# Patient Record
Sex: FEMALE | Race: WHITE
[De-identification: names, ages, dates, MRNs, and addresses within clinical notes are randomized per-mention and may not be internally consistent; named-entity substitution may affect disease eponyms.]

---

## 2023-06-23 ENCOUNTER — HOSPITAL ENCOUNTER (INPATIENT)
Dept: HOSPITAL 97 - ER | Age: 83
LOS: 3 days | Discharge: HOME | DRG: 291 | End: 2023-06-26
Attending: INTERNAL MEDICINE | Admitting: INTERNAL MEDICINE
Payer: COMMERCIAL

## 2023-06-23 VITALS — BODY MASS INDEX: 19 KG/M2

## 2023-06-23 DIAGNOSIS — Z88.8: ICD-10-CM

## 2023-06-23 DIAGNOSIS — J44.9: ICD-10-CM

## 2023-06-23 DIAGNOSIS — I71.20: ICD-10-CM

## 2023-06-23 DIAGNOSIS — R64: ICD-10-CM

## 2023-06-23 DIAGNOSIS — I48.91: ICD-10-CM

## 2023-06-23 DIAGNOSIS — Z88.3: ICD-10-CM

## 2023-06-23 DIAGNOSIS — Z88.2: ICD-10-CM

## 2023-06-23 DIAGNOSIS — I11.0: Primary | ICD-10-CM

## 2023-06-23 DIAGNOSIS — I50.31: ICD-10-CM

## 2023-06-23 LAB
ALBUMIN SERPL BCP-MCNC: 3 G/DL (ref 3.4–5)
ALP SERPL-CCNC: 129 U/L (ref 45–117)
ALT SERPL W P-5'-P-CCNC: 154 U/L (ref 13–56)
AST SERPL W P-5'-P-CCNC: 55 U/L (ref 15–37)
BILIRUB INDIRECT SERPL-MCNC: 0.4 MG/DL (ref 0.2–0.8)
BUN BLD-MCNC: 17 MG/DL (ref 7–18)
GLUCOSE SERPLBLD-MCNC: 122 MG/DL (ref 74–106)
HCT VFR BLD CALC: 39.3 % (ref 36–45)
LYMPHOCYTES # SPEC AUTO: 1 K/UL (ref 0.7–4.9)
MAGNESIUM SERPL-MCNC: 2.1 MG/DL (ref 1.6–2.4)
MCV RBC: 79.6 FL (ref 80–100)
NT-PROBNP SERPL-MCNC: 2649 PG/ML (ref ?–450)
PMV BLD: 7.9 FL (ref 7.6–11.3)
POTASSIUM SERPL-SCNC: 4 MEQ/L (ref 3.5–5.1)
RBC # BLD: 4.94 M/UL (ref 3.86–4.86)
TROPONIN I SERPL HS-MCNC: 14.7 PG/ML (ref ?–58.9)
TSH SERPL DL<=0.05 MIU/L-ACNC: 2.08 UIU/ML (ref 0.36–3.74)

## 2023-06-23 PROCEDURE — 94640 AIRWAY INHALATION TREATMENT: CPT

## 2023-06-23 PROCEDURE — 85025 COMPLETE CBC W/AUTO DIFF WBC: CPT

## 2023-06-23 PROCEDURE — 80053 COMPREHEN METABOLIC PANEL: CPT

## 2023-06-23 PROCEDURE — 74160 CT ABDOMEN W/CONTRAST: CPT

## 2023-06-23 PROCEDURE — 93005 ELECTROCARDIOGRAM TRACING: CPT

## 2023-06-23 PROCEDURE — 80076 HEPATIC FUNCTION PANEL: CPT

## 2023-06-23 PROCEDURE — 96374 THER/PROPH/DIAG INJ IV PUSH: CPT

## 2023-06-23 PROCEDURE — 99285 EMERGENCY DEPT VISIT HI MDM: CPT

## 2023-06-23 PROCEDURE — 71045 X-RAY EXAM CHEST 1 VIEW: CPT

## 2023-06-23 PROCEDURE — 84443 ASSAY THYROID STIM HORMONE: CPT

## 2023-06-23 PROCEDURE — 80048 BASIC METABOLIC PNL TOTAL CA: CPT

## 2023-06-23 PROCEDURE — 81001 URINALYSIS AUTO W/SCOPE: CPT

## 2023-06-23 PROCEDURE — 94760 N-INVAS EAR/PLS OXIMETRY 1: CPT

## 2023-06-23 PROCEDURE — 84484 ASSAY OF TROPONIN QUANT: CPT

## 2023-06-23 PROCEDURE — 71275 CT ANGIOGRAPHY CHEST: CPT

## 2023-06-23 PROCEDURE — 83880 ASSAY OF NATRIURETIC PEPTIDE: CPT

## 2023-06-23 PROCEDURE — 83735 ASSAY OF MAGNESIUM: CPT

## 2023-06-23 PROCEDURE — 36415 COLL VENOUS BLD VENIPUNCTURE: CPT

## 2023-06-23 RX ADMIN — FUROSEMIDE SCH MG: 10 INJECTION, SOLUTION INTRAVENOUS at 23:43

## 2023-06-23 RX ADMIN — Medication SCH ML: at 21:00

## 2023-06-23 RX ADMIN — SOTALOL HYDROCHLORIDE SCH MG: 80 TABLET ORAL at 23:43

## 2023-06-23 NOTE — RAD REPORT
EXAM DESCRIPTION:  CT - Chest For Pe Angio - 6/23/2023 3:45 pm

 

CLINICAL HISTORY:  Chest pain.

Afib with RVR

 

COMPARISON:  No comparisons

 

TECHNIQUE:  CT angiogram of the pulmonary arteries was performed with MIP.

 

All CT scans are performed using dose optimization technique as appropriate and may include automated
 exposure control or mA/KV adjustment according to patient size.

 

FINDINGS:  No evidence of pulmonary thromboembolism.

 

The ascending aorta is dilated to 4.9 cm compatible with aneurysm. Intraluminal contrast is not prese
nt within the thoracic aorta due to phase of scanning.

 

Linear opacities are present in both lung bases which may be atelectasis or pneumonia. Mild linear at
electasis also seen posterior left upper lobe.

 

Small left and small to moderate right pleural effusion.

 

No concerning bony finding.

 

IMPRESSION:  No evidence of pulmonary thromboembolism.

 

Ascending thoracic aortic aneurysm measuring 4.9 cm.

 

Small to moderate pleural effusion with atelectasis in both lung bases.

## 2023-06-23 NOTE — ER
Nurse's Notes                                                                                     

 [facility]                                                                 

Name: Toi Dhillon                                                                            

Age: 82 yrs                                                                                       

Sex: Female                                                                                       

: 1940                                                                                   

MRN: [id number]                                                                                   

Arrival Date: 2023                                                                          

Time: 11:00                                                                                       

Account#: [id number]                                                                            

Bed 8                                                                                             

Private MD: Travis Schmid V                                                                      

Diagnosis: Atrial fibrillation with rapid ventricular rate                                        

                                                                                                  

Presentation:                                                                                     

                                                                                             

11:05 Chief complaint: Patient states: fast heart rate onset yesterday. Patient states that   cm10

      she had a stress test done yesterday and her heart rate has been fast since yesterday.      

      Pt states that she took "some medicine from my homeopathic doctor for my blood pressure     

      and for my heart". Pt denies any chest pain but reports shortness of breath.                

      Coronavirus screen: Vaccine status: Patient reports being unvaccinated. Client denies       

      travel out of the [place] in the last 14 days. At this time, the client does not indicate      

      any symptoms associated with coronavirus-19. Ebola Screen: No symptoms or risks             

      identified at this time. Initial Sepsis Screen: Does the patient meet any 2 criteria?       

      No. Patient's initial sepsis screen is negative. Does the patient have a suspected          

      source of infection? No. Patient's initial sepsis screen is negative. Risk Assessment:      

      Do you want to hurt yourself or someone else? Patient reports no desire to harm self or     

      others. Onset of symptoms was 2023.                                                

11:05 Method Of Arrival: Wheelchair                                                           cm10

11:05 Acuity: EDUARDO 2                                                                           cm10

                                                                                                  

Historical:                                                                                       

- Allergies:                                                                                      

11:07 Sulfa (Sulfonamide Antibiotics);                                                        cm10

11:07 Levaquin;                                                                               cm10

11:07 Eliquis;                                                                                cm10

- PMHx:                                                                                           

11:07 AAA; Atrial fibrillation; Hypertensive disorder;                                        cm10

                                                                                                  

- Immunization history:: Adult Immunizations up to date.                                          

- Social history:: Smoking status: Patient denies any tobacco usage or history of.                

- Family history:: not pertinent.                                                                 

                                                                                                  

                                                                                                  

Screenin:15 [facility] ED Fall Risk Assessment (Adult) Score/Fall Risk Level 0 - 2 = Low Risk         ll1 

      Oriented to surroundings, Maintained a safe environment, Educated pt \T\ family on fall     

      prevention, incl call for assistance when getting out of bed, Hourly rounding (assess       

      needs \T\ fall precautionary measures) done. Abuse screen: Denies threats or abuse.         

      Nutritional screening: No deficits noted. Tuberculosis screening: No symptoms or risk       

      factors identified.                                                                         

                                                                                                  

Assessment:                                                                                       

11:14 General: Appears uncomfortable, Behavior is calm, cooperative, appropriate for age.     ll1 

      Pain: Complains of pain in chest Pain does not radiate. Pain began 2-3 days ago.            

      Cardiovascular: Reports fatigue, palpitations, shortness of breath.                         

12:03 Reassessment: No changes from previously documented assessment. Patient and/or family   ll1 

      updated on plan of care and expected duration. Pain level reassessed. Patient is alert,     

      oriented x 3, equal unlabored respirations, skin warm/dry/pink.                             

12:40 Reassessment: No changes from previously documented assessment. Patient and/or family   ll1 

      updated on plan of care and expected duration. Pain level reassessed. Patient is alert,     

      oriented x 3, equal unlabored respirations, skin warm/dry/pink.                             

13:14 Reassessment: No changes from previously documented assessment. Patient and/or family   ll1 

      updated on plan of care and expected duration. Pain level reassessed. Patient is alert,     

      oriented x 3, equal unlabored respirations, skin warm/dry/pink.                             

14:31 Reassessment: No changes from previously documented assessment. Patient and/or family   ll1 

      updated on plan of care and expected duration. Pain level reassessed.                       

14:56 Reassessment: No changes from previously documented assessment. SOB after walking to    os  

      restroom. Gait steady. Dr. Dempsey informed. Moved to exam room #8 via stretcher.        

                                                                                                  

Vital Signs:                                                                                      

11:05  / 95; Pulse 122; Resp 16; Temp 98.4; Pulse Ox 95% on R/A; Weight 49.9 kg; Height cm10

      5 ft. 1 in. ; Pain 0/10;                                                                    

11:28 Pulse 130; Resp 30;                                                                     ll1 

11:53 Pulse 103;                                                                              ll1 

12:06  / 103; Pulse 113;                                                                ll1 

12:40 Pulse 111; Resp 26; Pulse Ox 96% ;                                                      ll1 

12:54  / 108; Pulse 108; Resp 27;                                                       ll1 

13:13 Pulse 103; Resp 22; Pulse Ox 99% on R/A;                                                ll1 

14:52  / 105; Pulse 109; Resp 24; Pulse Ox 97% on 3 lpm NC;                             os  

11:05 Body Mass Index 20.78 (49.90 kg, 154.94 cm)                                             cm10

11:05 Pain Scale: Adult                                                                       cm10

                                                                                                  

ED Course:                                                                                        

11:02 Patient arrived in ED.                                                                  am2 

11:02 Travis Schmid MD is Private Physician.                                                 am2 

11:05 Devendra Dempsey MD is Attending Physician.                                            rt  

11:07 Triage completed.                                                                       cm10

11:08 Arm band placed on Patient placed in an exam room, on a stretcher.                      cm10

11:14 Fabi Fiore RN is Primary Nurse.                                                     ll1 

11:14 Inserted saline lock: 22 gauge in right antecubital area, using aseptic technique.      ll1 

      Blood collected.                                                                            

11:40 XRAY Chest (1 view) In Process Unspecified.                                             EDMS

12:31 Travis Schmid MD is Hospitalizing Provider.                                            rt  

                                                                                                  

Administered Medications:                                                                         

11:28 Drug: Metoprolol IVP 5 mg Route: IVP; Site: right antecubital;                          ll1 

12:07 Drug: Metoprolol IVP 5 mg Route: IVP; Site: right antecubital;                          ll1 

12:40 Drug: Metoprolol IVP 5 mg Route: IVP; Site: right antecubital;                          ll1 

12:55 Follow up: Response: No adverse reaction                                                ll1 

13:02 Drug: Levalbuterol Inhalation 0.63 mg Route: Inhalation;                                ll1 

13:13 Follow up: Response: No adverse reaction                                                ll1 

13:13 Drug: Sotalol PO 80 mg Route: PO;                                                       ll1 

14:56 Follow up: Response: No adverse reaction                                                os  

14:56 Drug: Levalbuterol Inhalation 0.63 mg Route: Inhalation;                                os  

                                                                                                  

                                                                                                  

Medication:                                                                                       

11:15 VIS not applicable for this client.                                                     ll1 

                                                                                                  

Outcome:                                                                                          

12:32 Decision to Hospitalize by Provider.                                                    rt  

18:01 Patient left the ED.                                                                    nj1 

                                                                                                  

Signatures:                                                                                       

Dispatcher [employer]                           EDMS                                                 

Alma Hernandes                               am2                                                  

Fabi Fiore, RN                       RN   ll1                                                  

Devendra Dempsey MD MD   rt                                                   

Iona Bahena RN                         RN   nj1                                                  

Dinh Moran RN RN   os                                                   

Marianne Nava RN                  RN   cm10                                                 

                                                                                                  

**************************************************************************************************

## 2023-06-23 NOTE — RAD REPORT
EXAM DESCRIPTION:  Julissat Single View6/23/2023 11:38 am

 

CLINICAL HISTORY:  CHEST PAIN

 

COMPARISON:  Chest Single View dated 5/6/2023; Abdomen 1 View (KUB) dated 12/28/2022; Chest Pa And La
t (2 Views) dated 10/27/2022; CHEST PA AND LAT 2 VIEW dated 2/3/2008

 

TECHNIQUE:   Portable AP view of the chest.

 

FINDINGS:  Developing central and bibasilar fluffy airspace opacities. Suspected small left pleural e
ffusion. Central interstitial prominence. No pneumothorax or effusion. The cardiomediastinal contours
 are unremarkable.

 

IMPRESSION:  Centrally predominant findings as above with a small left pleural effusion, suggesting p
ulmonary edema versus developing pneumonia.

## 2023-06-23 NOTE — EDPHYS
Physician Documentation                                                                           

 [facility]                                                                 

Name: Toi Dhillon                                                                            

Age: 82 yrs                                                                                       

Sex: Female                                                                                       

: 1940                                                                                   

MRN: [id number]                                                                                   

Arrival Date: 2023                                                                          

Time: 11:00                                                                                       

Account#: [id number]                                                                            

Bed 8                                                                                             

Private MD: Travis Schmid V                                                                      

ED Physician Devendra Dempsey                                                                     

HPI:                                                                                              

                                                                                             

11:37 This 82 yrs old Female presents to ER via Wheelchair with complaints of Chest Pain.     rt  

11:37 Patient with history of atrial fibrillation presents to the ED with a fast heart rate   rt  

      for about 1 week now. The patient has had shortness of breath, chest pressure               

      associated with it, denies overt pain. Of note, patient did have a chemical stress test     

      yesterday which was unable to be completed due to the fast heart rate and states that       

      the heart rate is been faster since then. She did take her metoprolol this morning. She     

      denies other acute complaints at this time. Symptoms are moderate in severity, no other     

      aggravating or alleviating factors..                                                        

                                                                                                  

Historical:                                                                                       

- Allergies:                                                                                      

11:07 Sulfa (Sulfonamide Antibiotics);                                                        cm10

11:07 Levaquin;                                                                               cm10

11:07 Eliquis;                                                                                cm10

- PMHx:                                                                                           

11:07 AAA; Atrial fibrillation; Hypertensive disorder;                                        cm10

                                                                                                  

- Immunization history:: Adult Immunizations up to date.                                          

- Social history:: Smoking status: Patient denies any tobacco usage or history of.                

- Family history:: not pertinent.                                                                 

                                                                                                  

                                                                                                  

ROS:                                                                                              

11:37 Constitutional: Negative for fever, chills, and weight loss, Abdomen/GI: Negative for   rt  

      abdominal pain, nausea, vomiting, diarrhea, and constipation, MS/Extremity: Negative        

      for injury and deformity, Skin: Negative for injury, rash, and discoloration, Neuro:        

      Negative for headache, weakness, numbness, tingling, and seizure, Psych: Negative for       

      depression, anxiety, suicide ideation, homicidal ideation, and hallucinations.              

11:37 Cardiovascular: Positive for chest pain, palpitations.                                      

11:37 Respiratory: Positive for shortness of breath, Negative for cough.                          

                                                                                                  

Exam:                                                                                             

11:37 Constitutional:  This is a well developed, well nourished patient who is awake, alert,  rt  

      and in no acute distress. Head/Face:  Normocephalic, atraumatic. Chest/axilla:  Normal      

      chest wall appearance and motion.  Nontender with no deformity.  No lesions are             

      appreciated. Cardiovascular:  Regular rate and rhythm with a normal S1 and S2.  No          

      gallops, murmurs, or rubs.  Normal PMI, no JVD.  No pulse deficits. Respiratory:  Lungs     

      have equal breath sounds bilaterally, clear to auscultation and percussion.  No rales,      

      rhonchi or wheezes noted.  No increased work of breathing, no retractions or nasal          

      flaring. Abdomen/GI:  Soft, non-tender, with normal bowel sounds.  No distension or         

      tympany.  No guarding or rebound.  No evidence of tenderness throughout. Skin:  Warm,       

      dry with normal turgor.  Normal color with no rashes, no lesions, and no evidence of        

      cellulitis. MS/ Extremity:  Pulses equal, no cyanosis.  Neurovascular intact.  Full,        

      normal range of motion. Neuro:  Awake and alert, GCS 15, oriented to person, place,         

      time, and situation.  Cranial nerves II-XII grossly intact.  Motor strength 5/5 in all      

      extremities.  Sensory grossly intact.  Cerebellar exam normal.  Normal gait. Psych:         

      Awake, alert, with orientation to person, place and time.  Behavior, mood, and affect       

      are within normal limits.                                                                   

11:37 ECG was reviewed by the Attending Physician.                                                

                                                                                                  

Vital Signs:                                                                                      

11:05  / 95; Pulse 122; Resp 16; Temp 98.4; Pulse Ox 95% on R/A; Weight 49.9 kg; Height cm10

      5 ft. 1 in. ; Pain 0/10;                                                                    

11:28 Pulse 130; Resp 30;                                                                     ll1 

11:53 Pulse 103;                                                                              ll1 

12:06  / 103; Pulse 113;                                                                ll1 

12:40 Pulse 111; Resp 26; Pulse Ox 96% ;                                                      ll1 

12:54  / 108; Pulse 108; Resp 27;                                                       ll1 

13:13 Pulse 103; Resp 22; Pulse Ox 99% on R/A;                                                ll1 

14:52  / 105; Pulse 109; Resp 24; Pulse Ox 97% on 3 lpm NC;                             os  

11:05 Body Mass Index 20.78 (49.90 kg, 154.94 cm)                                             cm10

11:05 Pain Scale: Adult                                                                       cm10

                                                                                                  

MDM:                                                                                              

11:09 Patient medically screened.                                                             rt  

16:59 Differential diagnosis: A-fib with RVR, electrolyte disturbance, thyrotoxicosis. Data   rt  

      reviewed: vital signs, nurses notes, lab test result(s), EKG, radiologic studies.           

      Consideration of Admission/Observation Patient was admitted/placed on observation.          

      Management of patient was discussed with the following: Consultant: Discussed with          

      cardiology on-call, recommends sotalol be started and the patient be admitted to the        

      hospital. Primary Care Provider: Agrees to meet patient, request clonidine be ordered       

      as needed for hypertension. I considered the following discharge prescriptions or           

      medication management in the emergency department Medications were administered in the      

      Emergency Department. See MAR. Independent interpretation of the following test(s) in       

      the Emergency Department X-Ray: My interpretation is Edema seen on my interpretation of     

      the x-ray images. Test considered but Not performed: CT: Low suspicion for PE, CT           

      angiogram not indicated. Care significantly affected by the following chronic               

      conditions: Atrial fibrillation. Counseling: I had a detailed discussion with the           

      patient and/or guardian regarding: the historical points, exam findings, and any            

      diagnostic results supporting the discharge/admit diagnosis, lab results, radiology         

      results, the need for further work-up and treatment in the hospital. Response to            

      treatment: the patient's symptoms have markedly improved after treatment.                   

                                                                                                  

                                                                                             

11:15 Order name: Basic Metabolic Panel; Complete Time: 12:00                                 1 

                                                                                             

11:15 Order name: CBC with Diff; Complete Time: 11:56                                         [facility] 

                                                                                             

11:15 Order name: Troponin HS; Complete Time: 12:00                                           [facility] 

                                                                                             

11:17 Order name: LFT's; Complete Time: 12:00                                                 rt  

                                                                                             

11:17 Order name: Magnesium; Complete Time: 12:00                                             rt  

                                                                                             

11:17 Order name: NT PRO-BNP; Complete Time: 12:00                                            rt  

                                                                                             

11:17 Order name: TSH; Complete Time: 12:00                                                   rt  

                                                                                             

16:37 Order name: Troponin High Sensitivity; Complete Time: 16:40                             EDMS

                                                                                             

11:15 Order name: XRAY Chest (1 view); Complete Time: 11:56                                   1 

                                                                                             

15:56 Order name: CT; Complete Time: 15:57                                                    EDMS

                                                                                             

11:15 Order name: EKG; Complete Time: 11:16                                                   [facility] 

                                                                                             

13:38 Order name: CONS Physician Consult                                                      EDMS

                                                                                             

11:15 Order name: Cardiac monitoring; Complete Time: 11:16                                    [facility] 

                                                                                             

11:15 Order name: EKG - Nurse/Tech; Complete Time: 11:16                                                                                                                                   

11:15 Order name: IV Saline Lock; Complete Time: 11:15                                                                                                                                     

11:15 Order name: Labs collected and sent; Complete Time: 11:15                                                                                                                            

11:15 Order name: O2 Per Protocol; Complete Time: 11:15                                                                                                                                    

11:15 Order name: O2 Sat Monitoring; Complete Time: 11:15                                     ll 

                                                                                                  

EC:37 Rate is 123 beats/min. Rhythm is irregularly irregular, A fib with No ectopy. QRS       rt  

      interval is normal. QT interval is normal. No Q waves. T waves are Normal. No ST            

      changes noted. Interpreted by me.                                                           

                                                                                                  

Administered Medications:                                                                         

11:28 Drug: Metoprolol IVP 5 mg Route: IVP; Site: right antecubital;                          ll1 

12:07 Drug: Metoprolol IVP 5 mg Route: IVP; Site: right antecubital;                          ll1 

12:40 Drug: Metoprolol IVP 5 mg Route: IVP; Site: right antecubital;                          ll1 

12:55 Follow up: Response: No adverse reaction                                                ll1 

13:02 Drug: Levalbuterol Inhalation 0.63 mg Route: Inhalation;                                ll1 

13:13 Follow up: Response: No adverse reaction                                                ll1 

13:13 Drug: Sotalol PO 80 mg Route: PO;                                                       ll1 

14:56 Follow up: Response: No adverse reaction                                                os  

14:56 Drug: Levalbuterol Inhalation 0.63 mg Route: Inhalation;                                os  

                                                                                                  

                                                                                                  

Disposition:                                                                                      

16:59 Critical Care:.                                                                         rt  

                                                                                                  

Disposition Summary:                                                                              

23 12:32                                                                                    

Hospitalization Ordered                                                                           

      Hospitalization Status: Observation                                                     rt  

      Provider: Travis Schmid                                                                 rt  

      Condition: Stable                                                                       rt  

      Problem: new                                                                            rt  

      Symptoms: have improved                                                                 rt  

      Bed/Room Type: Standard                                                                 rt  

      Location: Telemetry/MedSurg (observation)(23 16:04)                               [place] 

      Room Assignment: 210(23 16:04)                                                    breann 

      Diagnosis                                                                                   

        - Atrial fibrillation with rapid ventricular rate                                     rt  

      Forms:                                                                                      

        - Medication Reconciliation Form                                                      rt  

        - SBAR form                                                                           rt  

Critical care time excluding procedures:                                                          

16:59 Critical care time: Bedside Care: 30 minutes, Consultation: 10 minutes. Total time: 40  rt  

      minutes                                                                                     

                                                                                                  

Signatures:                                                                                       

Dispatcher [employer]                           EDCassie Javier RN                        RN   [id number]                                                  

Duran Andrew, RN                       RN   [id number]                                                  

Fabi Fiore, RN                       RN   ll1                                                  

Devendra Dempsey MD MD   rt                                                   

Dinh Moran, RN                       Marianne Berrios RN                  RN   cm10                                                 

                                                                                                  

Corrections: (The following items were deleted from the chart)                                    

14:56 12:32 Telemetry/MedSurg (observation) rt                                                jl7 

14:56 12:32 rt                                                                                jl7 

16:04 14:56 [facility] ER HOLD jl7                                                                  ja1 

16:04 14:56 ERHOLD- jl7                                                                       ja1 

                                                                                                  

**************************************************************************************************

## 2023-06-23 NOTE — XMS REPORT
Continuity of Care Document

                            Created on:2023



Patient:ROBBY MCKNIGHT

Sex:Female

:1940

External Reference #:[id number]





Demographics







                          Address                   [address]



                                                    [address] 



                                                    [place] [address]

 

                          Home Phone                [phone]

 

                          Work Phone                [phone]

 

                          Mobile Phone              [phone]

 

                          Email Address             [email or web address]

 

                          Preferred Language        English

 

                          Marital Status            Unknown

 

                          Zoroastrian Affiliation     Unknown

 

                          Race                      Unknown

 

                          Additional Race(s)        Unavailable

 

                          Ethnic Group              Unknown









Author







                          Organization              [facility]

t

 

                          Address                   [address] [place] [address] [address] [place] [address]

 

                          Phone                     [phone]









Support







                Name            Relationship    Address         Phone

 

                GEORGE DE LUNA   SP              [address]       [phone]



                                                [place] [address] 

 

                GEORGE DE LUNA   SP              [address]       [phone]



                                                [place] [address] 

 

                CASSANDRA MCKNIGHT   GC              [address]      [phone]



                                                [place] [address] 

 

                RADHA MCKNIGHT   CH              Unavailable     [phone]

 

                JUAN LUIS GEORGE  SP              [address]      [phone]



                                                [place] [address] 

 

                IRIS MCKNIGHT   CH              [address]      [phone]



                                                [place] [address] 

 

                JUANITA MCKNIGHT   Unavailable     [address]      [phone]



                                                [place] [address] 

 

                BRYANTNEARIADNA CASSANDRA   Unavailable     [address]      [phone]



                                                [place] [address] 

 

                ENRIQUE MCKNIGHT               Unavailable     [phone]









Care Team Providers







                    Name                Role                Phone

 

                    PERRYCORALEROYDANETTEDOTTIETAMRA Primary Care Physician Unavailable

 

                    MAURICE BALDERAS      Attending Clinician Unavailable

 

                    DREW MONROY Attending Clinician Unavailable

 

                    RACHEL CIFUENTES Attending Clinician Unavailable

 

                    Doctor Unassigned, No Name Attending Clinician Unavailable

 

                    Nicolette ERNST, Lisandro SUBRAMANIAN Attending Clinician Unavailable

 

                    Jun Dominique DO   Attending Clinician [phone]

 

                    MCKENNA BECKHAM Attending Clinician Annie

magdi Sprague MD, Sofia Henry Attending Clinician [phone]

[phone]

 

                    India SERRA, Payton Delgado Attending Clinician [phone]

[phone]

 

                    Anthony BLANDON, Mckenna Saldana [facility] Attending Clinician 

[phone]

 

                    Pob, [facility]   Attending Clinician Unavailable

 

                    Isael BLANDON, Radha Attending Clinician [phone]

 

                    RADHA BUSTILLOS   Attending Clinician Unavailable

 

                    Yousuf Miller MD Attending Clinician [phone]

 

                    YOUSUF MILLER   Attending Clinician Unavailable

 

                    MAURICE SWAN     Attending Clinician Unavailable

 

                    Aida              Attending Clinician Unavailable

 

                    MAURICE BALDERAS      Admitting Clinician Unavailable

 

                    DREW MONROY Admitting Clinician Unavailable

 

                    INDIA, PAYTON DELGADO Admitting Clinician Unavailable

 

                    India SERRA, Payton Delgado Admitting Clinician [phone]

8

 

                    Aida              Admitting Clinician Unavailable









Payers







           Payer Name Policy Type Policy Number Effective Date Expiration Date S

[place]

 

           MEDICARE [place]:            [id number] 2003            



           [employer]                       00:00:00              

 

           [employer]            [id number]                         



           [employer]                                             



           - PLAN G (MEDICARE                                             



           SUPPLEMENT)                                             







Problems







       Condition Condition Condition Status Onset  Resolution Last   Treating Co

mments 

Source



       Name   Details Category        Date   Date   Treatment Clinician        



                                                 Date                 

 

       Atrial Atrial Disease Active                              [facility]



       fibrillati fibrillati               6-11                               it

y of



       on,    on,                  00:00:                             [place]



       unspecifie unspecifie               00                                 Me

dical



       d type d type                                                  [facility]

 

       Chest  Chest  Disease Active                              [facility]



       pain,  pain,                6-10                               ity of



       unspecifie unspecifie               00:00:                             [facility]

[place]



       d type d type               00                                 Medical



                                                                      Branch







Allergies, Adverse Reactions, Alerts







       Allergy Allergy Status Severity Reaction(s) Onset  Inactive Treating Comm

ents 

Source



       Name   Type                        Date   Date   Clinician        

 

       Levoflox Propensi Active        Other - See                       U

nivers



       acin   ty to                comments                         ity of



              adverse                      00:00:                      [place]



              reaction                      00                          Medical



              s                                                       Branch

 

       LEVOFLOX DRUG   Active        Other-Cmnt                       Univ

ers



       ACIN   INGREDI                      6                        ity of



                                          00:00:                      [place]



                                          00                          Medical



                                                                      Branch

 

       levoflox DA     Active SV     leg swelling                       HC

A



       acin                               4-27                        [facility]



                                          00:00:                      [place]



                                          00                          [facility]

 

       levoflox DA     Active SV                                  [facility]



       acin                               9                        [place]



                                          00:00:                      [facility]



                                          00                          dic



                                                                      [facility]osp[facility]



                                                                      l

 

       Sulfa  DA     Active MI                                  [facility]



       (Sulfona                             3-19                        [place]



       mide                               00:00:                      [facility]



       Antibiot                             00                          dic



       ics)                                                           Hospita



                                                                      l

 

       iodine DA     Active MI                                  [facility]



                                          3-19                        [place]



                                          00:00:                      [facility]



                                          00                          dic



                                                                      Hospita



                                                                      l

 

       NO KNOWN Drug   Active                                           [facility]



       ALLERGIE Class                                                   ity of



       S                                                              [facility]







Social History







           Social Habit Start Date Stop Date  Quantity   Comments   Source

 

           Exposure to 2022 2022-06-10 Unable to assess            Univers

ity of



           SARS-CoV-2 00:00:00   02:23:00                         [facility]



           (event)                                                [place]

 

           Tobacco use and 2020 Smokeless tobacco            Un

iversity of



           exposure   00:00:00   00:00:00   non-user              [facility]

 

           Sex Assigned At 1940                       Universit

y of



           Birth      00:00:00   00:00:00                         [facility]









                Smoking Status  Start Date      Stop Date       Source

 

                Unknown if ever smoked                                 [facility]

 

                Never smoked tobacco                                 [facility]







Medications







       Ordered Filled Start  Stop   Current Ordering Indication Dosage Frequency

 Signature

                    Comments            Components          Source



     Medication Medication Date Date Medication? Clinician                (SIG) 

          



     Name Name                                                   

 

     clonazePAM            Yes            1mg       1 mg,           Univer

s



     (KLONOPIN)      6-11                               Oral, TID,           ity

 of



     tablet 1 mg      19:00:                               First dose           

[place]



               00                                 on [facility]



                                                  22 at           Branch



                                                  1400,           



                                                  Until           



                                                  Discontinu           



                                                  ed,            



                                                  Routine           

 

     apixaban            Yes            2.5mg      2.5 mg,           Unive

rs



     (ELIQUIS)      6-11                               Oral, BID,           ity 

of



     tablet 2.5      18:15:                               First dose           T

[place]



     mg        00                                 on [facility]



                                                  22 at           Branch



                                                  1315,           



                                                  Until           



                                                  Discontinu           



                                                  ed,            



                                                  Routine<br           



                                                  >Indicatio           



                                                  ns:            



                                                  Non-Valvul           



                                                  ar Atrial           



                                                  Fibrillati           



                                                  on             

 

     clonazePAM            Yes            1mg       Take 1 mg           Un

darrick



     1 mg tablet      6-11                               by mouth 3           it

y of



               17:17:                               (three)           [place]



               06                                 times           Medical



                                                  daily.           Branch

 

     clonazePAM      -0      Yes            1mg       Take 1 mg           Un

darrick



     1 mg tablet      6-11                               by mouth 3           it

y of



               17:17:                               (three)           [place]



               06                                 times           Medical



                                                  daily.           Branch

 

     clonazePAM      -0      Yes            1mg       Take 1 mg           Un

darrick



     1 mg tablet      6-11                               by mouth 3           it

y of



               17:17:                               (three)           [place]



               06                                 times           Medical



                                                  daily.           Branch

 

     clonazePAM            Yes            1mg       Take 1 mg           Un

darrick



     1 mg tablet                                     by mouth 3           it

y of



               17:17:                               (three)           [place]



               06                                 times           Medical



                                                  daily.           Branch

 

     clonazePAM            Yes            1mg       Take 1 mg           Un

darrick



     1 mg tablet                                     by mouth 3           it

y of



               17:17:                               (three)           [place]



               06                                 times           Medical



                                                  daily.           [place]

 

     magnesium       No             2g        2 g, IV           Univ

ers



     sulfate in                                Piggyback,           it

y of



     water 2      12:00: 17:45                          Administer           [place]

as



     gram/50 mL      00   :00                           over 60           Medica

l



     (4 %)                                         Minutes,           [place]



     infusion 2                                         ONCE, 1           



     g                                            dose, On           



                                                  Sat            



                                                  22 at           



                                                  0700,           



                                                  Routine           

 

     KCL        No             20meq      20 mEq,           Univers



     (KLOR-CON                                Oral,           ity of



     M20) tablet      12:00: 16:45                          ONCE, 1           Te

x[place]



     20 mEq      00   :00                           dose, On           Medical



                                                  Sat            [place]



                                                  22 at           



                                                  0700,           



                                                  Routine           

 

     predniSONE       No             20mg      Take 20 mg           

[facility]



     20 mg                                by mouth           ity of



     tablet      10:49: 00:00                          daily.           [place]



               53   :00                                          Medical



                                                                 [place]

 

     atorvastati            Yes            80mg      80 mg,           Univ

ers



     n (LIPITOR)                                     Oral, QHS,           it

y of



     tablet 80      02:00:                               First dose           Te

x[place]



     mg        00                                 on Fri           [facility]



                                                  6/10/22 at           [place]



                                                  2100,           



                                                  Until           



                                                  Discontinu           



                                                  ed,            



                                                  Routine           

 

     apixaban      2022- No        1358 2.5mg      Take 1           Unive

rs



     2.5 mg                                tablet by           ity of



     tablet      00:00: 04:59                          mouth 2           [place]



               00   :00                           (two)           Medical



                                                  times           Branch



                                                  daily for           



                                                  60 days.           



                                                  Indication           



                                                  s: atrial           



                                                  fibrillati           



                                                  on             

 

     metoprolol      2022- No        [id number] 25mg      Take 1           

[facility]



     succinate                                tablet by           ity 

of



     XL 25 mg 24      00:00: 04:59                          mouth           [place]

s



     hr tablet      00   :00                           daily for           Medic

al



                                                  60 days.           [place]

 

     apixaban      2022- No        1358 2.5mg      Take 1           Unive

rs



     2.5 mg      6-11 08-11                          tablet by           ity of



     tablet      00:00: 04:59                          mouth 2           [place]



               00   :00                           (two)           Medical



                                                  times           [place]



                                                  daily for           



                                                  60 days.           



                                                  Indication           



                                                  s: atrial           



                                                  fibrillati           



                                                  on             

 

     metoprolol      2022- No        [id number] 25mg      Take 1           

[facility]



     succinate      6- 08-11                          tablet by           ity 

of



     XL 25 mg 24      00:00: 04:59                          mouth           [place]

s



     hr tablet      00   :00                           daily for           Medic

al



                                                  60 days.           Branch

 

     apixaban      2022- No        1358 2.5mg      Take 1           Unive

rs



     2.5 mg      6- 08-11                          tablet by           ity of



     tablet      00:00: 04:59                          mouth 2           [place]



               00   :00                           (two)           Medical



                                                  times           [place]



                                                  daily for           



                                                  60 days.           



                                                  Indication           



                                                  s: atrial           



                                                  fibrillati           



                                                  on             

 

     metoprolol       No        [id number] 25mg      Take 1           

[facility]



     succinate      6- 08-11                          tablet by           ity 

of



     XL 25 mg 24      00:00: 04:59                          mouth           [place]

s



     hr tablet      00   :00                           daily for           Medic

al



                                                  60 days.           Branch

 

     apixaban      2022- No        1358 2.5mg      Take 1           Unive

rs



     2.5 mg      6--11                          tablet by           ity of



     tablet      00:00: 04:59                          mouth 2           [place]



               00   :00                           (two)           Medical



                                                  times           [place]



                                                  daily for           



                                                  60 days.           



                                                  Indication           



                                                  s: atrial           



                                                  fibrillati           



                                                  on             

 

     metoprolol       No        [id number] 25mg      Take 1           

[facility]



     succinate      6- 08-11                          tablet by           ity 

of



     XL 25 mg 24      00:00: 04:59                          mouth           [place]

s



     hr tablet      00   :00                           daily for           Medic

al



                                                  60 days.           Branch

 

     apixaban      2022- No        1358 2.5mg      Take 1           Unive

rs



     2.5 mg      6- 08-11                          tablet by           ity of



     tablet      00:00: 04:59                          mouth 2           [place]



               00   :00                           (two)           Medical



                                                  times           [place]



                                                  daily for           



                                                  60 days.           



                                                  Indication           



                                                  s: atrial           



                                                  fibrillati           



                                                  on             

 

     metoprolol       No        [id number] 25mg      Take 1           

[facility]



     succinate      6-11 08-11                          tablet by           ity 

of



     XL 25 mg 24      00:00: 04:59                          mouth           [place]

s



     hr tablet      00   :00                           daily for           Medic

al



                                                  60 days.           Br[facility]

 

     predniSONE      2022- No        [id number] 10mg      Take 1           

[facility]



     10 mg                                tablet by           ity of



     tablet      00:00: 04:59                          mouth           [place]



               00   :00                           daily for           Medical



                                                  1 day.           Branch

 

     HEPARIN      2022- No             60U/kg      2,724           Univer

s



     SODIUM      6-10 06-10                          Units (60           ity of



     (PORCINE)      18:30: 21:20                          Units/kg           [facility]

as



     1,000      00   :00                           ?45.4 kg),           Medical



     UNIT/ML                                         IV Push,           [facility]



     BOLUS ACS                                         ONCE, 1           



     ORDER SET                                         dose, On           



                                                  Fri            



                                                  6/10/22 at           



                                                  1330, ASAP           

 

     heparin      2022- No             12U/kg/      12             Univer

s



     25,000      6-10 06-11                h         Units/kg/h           ity of



     Units/250      18:22: 18:06                          r ?45.4 kg           T

[facility]



     mL        05   :34                           (5.448           Medical



     (Premixed                                         mL/hr,           Br[facility]



     Bag) in                                         rounded to           



     0.45 % NS                                         5.45           



                                                  mL/hr), IV           



                                                  Infusion,           



                                                  TITRATE,           



                                                  Parameters           



                                                  in Admin.           



                                                  Instr.,           



                                                  Starting           



                                                  on Fri           



                                                  6/10/22 at           



                                                  1322<br>CA           



                                                  UTION - If           



                                                  LMWH given           



                                                  in ER,           



                                                  AVOID           



                                                  bolus and           



                                                  start next           



                                                  dose/drip           



                                                  12 hrs           



                                                  after ER           



                                                  dosage.&nb           



                                                  sp;&nbsp;M           



                                                  ust            



                                                  program           



                                                  rate using           



                                                  programmab           



                                                  le             



                                                  infusion           



                                                  pump.&nbsp           



                                                  ;&nbsp;[place]           



                                                  ck with           



                                                  the            



                                                  ordering           



                                                  provider           



                                                  first           



                                                  prior to           



                                                  any            



                                                  administra           



                                                  tion           



                                                  should the           



                                                  patient be           



                                                  on             



                                                  existing/a           



                                                  dditional           



                                                  anticoagul           



                                                  ant            



                                                  therapy.           



                                                  &nbsp;Rang           



                                                  e, Dosing           



                                                  and            



                                                  Testing:           



                                                  __________           



                                                  __________           



                                                  __________           



                                                  __________           



                                                  __________           



                                                  __________           



                                                  __________           



                                                  &nbsp;&nbs           



                                                  p;FOR           



                                                  [place],           



                                                  [facility] AND           



                                                  [facility]            



                                                  CAMPUSES           



                                                  ONLY           



                                                  &nbsp;&nbs           



                                                  p; - aPTT           



                                                  <              



                                                  35:&nbsp;&           



                                                  nbsp;Bolus           



                                                  5000           



                                                  units,           



                                                  increase           



                                                  rate 300           



                                                  units/hr&n           



                                                  bsp; -           



                                                  aPTT           



                                                  35-44:&nbs           



                                                  p;&nbsp;Bird           



                                                  howard 3000           



                                                  units,           



                                                  increase           



                                                  rate 200           



                                                  units/hr&n           



                                                  bsp; -           



                                                  aPTT           



                                                  45-54:&nbs           



                                                  p;&nbsp;In           



                                                  crease           



                                                  rate 100           



                                                  units/hr&n           



                                                  bsp; -           



                                                  aPTT           



                                                  55-85:&nbs           



                                                  p;&nbsp;NO           



                                                  CHANGE&nbs           



                                                  p; - aPTT           



                                                  86-95:&nbs           



                                                  p;&nbsp;De           



                                                  crease           



                                                  rate 100           



                                                  units/hr&n           



                                                  bsp; -           



                                                  aPTT           



                                                  :&nb           



                                                  sp;&nbsp;H           



                                                  old 30           



                                                  minutes,           



                                                  decrease           



                                                  rate 150           



                                                  units/hr&n           



                                                  bsp; -           



                                                  aPTT >           



                                                  120:&nbsp;           



                                                  &nbsp;Hold           



                                                  60             



                                                  minutes,           



                                                  decrease           



                                                  rate 200           



                                                  units/hr&n           



                                                  bsp;&nbsp;           



                                                  Check aPTT           



                                                  6 hours           



                                                  after           



                                                  initiation           



                                                  , then Q6H           



                                                  after           



                                                  every           



                                                  change,           



                                                  aPTT Q12H           



                                                  once           



                                                  therapeuti           



                                                  c levels           



                                                  are            



                                                  reached.&n           



                                                  bsp;&nbsp;           



                                                  &nbsp;&nbs           



                                                  p;________           



                                                  __________           



                                                  __________           



                                                  __________           



                                                  __________           



                                                  __________           



                                                  __________           



                                                  __&nbsp;&n           



                                                  bsp;FOR           



                                                  [facility]           



                                                  ONLY&nbsp;           



                                                  &nbsp; -           



                                                  aPTT <           



                                                  40:&nbsp;&           



                                                  nbsp;Bolus           



                                                  5000           



                                                  units,           



                                                  increase           



                                                  rate 300           



                                                  units/hr&n           



                                                  bsp; -           



                                                  aPTT           



                                                  40-49:&nbs           



                                                  p;&nbsp;Bird           



                                                  howard 3000           



                                                  units,           



                                                  increase           



                                                  rate 200           



                                                  units/hr&n           



                                                  bsp; -           



                                                  aPTT           



                                                  50-59:&nbs           



                                                  p;&amp;nbs           



                                                  p;Increase           



                                                  rate 100           



                                                  units/hr&n           



                                                  bsp; -           



                                                  aPTT           



                                                  60-85:&nbs           



                                                  p;&nbsp;NO           



                                                  CHANGE&nbs           



                                                  p; - aPTT           



                                                  86-95:&nbs           



                                                  p;&nbsp;De           



                                                  crease           



                                                  rate 100           



                                                  units/hr&n           



                                                  bsp; -           



                                                  aPTT           



                                                  :&nb           



                                                  sp;&nbsp;H           



                                                  old 30           



                                                  minutes,           



                                                  decrease           



                                                  rate 150           



                                                  units/hr&n           



                                                  bsp; -           



                                                  aPTT >           



                                                  120:&nbsp;           



                                                  &nbsp;Hold           



                                                  60             



                                                  minutes,           



                                                  decrease           



                                                  rate 200           



                                                  units/hr&n           



                                                  bsp;&nbsp;           



                                                  Check aPTT           



                                                  6 hours           



                                                  after           



                                                  initiation           



                                                  , then Q6H           



                                                  after           



                                                  every           



                                                  change,           



                                                  aPTT Q12H           



                                                  once           



                                                  therapeuti           



                                                  c levels           



                                                  are            



                                                  reached.&n           



                                                  bsp;&nbsp;           



                                                  &nbsp;DO           



                                                  NOT ADJUST           



                                                  INITIAL           



                                                  BOLUS OR           



                                                  INITIAL           



                                                  INFUSION           



                                                  RATE.<br>           

 

     regadenoson      2022- No        [id number] .4mg      0.4 mg,         

  [facility]



     (LEXISCAN)      6-10 06-10                          Slow IV           ity o

f



     injection      17:15: 17:00                          Push,           [place]



     0.4 mg      00   :00                           ONCE, 1           Medical



                                                  dose, On           Branch



                                                  Fri            



                                                  6/10/22 at           



                                                  1215,           



                                                  Routine<br           



                                                  >Faculty           



                                                  member           



                                                  approving           



                                                  Restricted           



                                                  medication           



                                                  :              



                                                  BILL MOREL           

 

     tc        2022- No        [id number] 14.7mCi      14.7           Unive

rs



     99m-tetrofo      6-10 06-10                          millicurie           i

ty of



     smin      17:00: 18:00                          ,              [place]



     (MYOVIEW)      00   :00                           Intravenou           Medi

natalie



     injection                                         s, ONCE, 1           [facility]

ch



     14.7                                         dose, On           



     [facility]                                         Fri            



                                                  6/10/22 at           



                                                  1200,           



                                                  Routine           

 

     predniSONE            Yes            10mg      10 mg,           Unive

rs



     (DELTASONE)      6-10                               Oral,           ity of



     tablet 10      14:00:                               DAILY,           [place]



     mg        00                                 First dose           Medical



                                                  on Fri           Branch



                                                  6/10/22 at           



                                                  0900,           



                                                  Until           



                                                  Discontinu           



                                                  ed,            



                                                  Routine           

 

     aspirin            Yes            81mg      81 mg,           [facility]



     chewable      10                               Oral,           ity of



     tablet 81      14:00:                               DAILY,           [place]



     mg        00                                 First dose           Medical



                                                  on Fri           Branch



                                                  6/10/22 at           



                                                  0900,           



                                                  Until           



                                                  Discontinu           



                                                  ed,            



                                                  Routine           

 

     levalbutero            Yes            .31mg      0.31 mg,           U

nivers



     l (XOPENEX)      6-10                               Inhalation           it

y of



     nebulizer      13:00:                               , TID,           [place]



     solution      00                                 First dose           Medic

al



     0.31 mg                                         on Fri           Branch



                                                  6/10/22 at           



                                                  0800,           



                                                  Until           



                                                  Discontinu           



                                                  ed,            



                                                  Routine           

 

     heparin      2022- No             5000U      5,000           [facility]



     (porcine)      6-10 06-10                          Units,           ity of



     injection      13:00: 18:23                          Subcutaneo           T

[facility]



     5,000 Units      00   :11                           us, Q12H,           Med

ical



                                                  First dose           Branch



                                                  on Fri           



                                                  6/10/22 at           



                                                  0800,           



                                                  Until           



                                                  Discontinu           



                                                  ed,            



                                                  Routine           

 

     KCL       2022- No             40meq      40 mEq,           [facility]



     (KLOR-CON      6-10 06-10                          Oral, 5X           ity o

f



     M20) tablet      11:00: 18:59                          DAY, 2           [facility]

as



     40 mEq      00   :00                           doses,           Medical



                                                  First dose           Branch



                                                  on Fri           



                                                  6/10/22 at           



                                                  0600, Last           



                                                  dose on           



                                                  Fri            



                                                  6/10/22 at           



                                                  1000,           



                                                  Routine           

 

     acetaminoph            Yes            650mg      650 mg,           Un

darrick



     en        6-10                               Oral,           ity of



     (TYLENOL)      08:23:                               Q6HPRN,           [place]



     tablet 650      33                                 Starting           Medic

al



     mg                                           on Fri           Branch



                                                  6/10/22 at           



                                                  0323,           



                                                  Until           



                                                  Discontinu           



                                                  ed,            



                                                  Routine,           



                                                  Pain           



                                                  (scale           



                                                  1-3)           

 

     LORazepam      2022- No             .5mg      0.5 mg,           Univ

ers



     (ATIVAN)      6-10 06-10                          Slow IV           ity of



     injection      06:30: 05:24                          Push,           [place]



     0.5 mg      00   :00                           ONCE, 1           Medical



                                                  dose, On           Branch



                                                  Fri            



                                                  6/10/22 at           



                                                  0130, STAT           

 

     acetaminoph       No             1000mg      1,000 mg,         

  [facility]



     en        6-10 06-10                          Oral, ONCE           ity of



     (TYLENOL)      03:45: 02:46                          NOW, 1           [place]



     tablet      00   :00                           dose, On           Medical



     1,000 mg                                         Thu 22           Branc

h



                                                  at 2245,           



                                                  Routine           

 

     aspirin       No             325mg      325 mg,           Unive

rs



     tablet 325      6-10 06-10                          Oral,           ity of



     mg        02:00: 01:00                          ONCE, 1           [place]



               00   :00                           dose, On           Medical



                                                  u 22           Branch



                                                  at 2100,           



                                                  STAT           

 

     diltiazem      2022- No             15mg      15 mg, IV           Un

darrick



     (CARDIZEM      6-10 06-10                          Push,           ity of



     IV)       02:00: 00:53                          ONCE, 1           [place]



     injection      00   :00                           dose, On           Medica

l



     15 mg                                         Thu 22           Branch



                                                  at 2100,           



                                                  STAT<br>Fa           



                                                  culty           



                                                  member           



                                                  approving           



                                                  Restricted           



                                                  medication           



                                                  :              



                                                  SOFIA MARTINEZ           







Vital Signs







             Vital Name   Observation Time Observation Value Comments     Source

 

             Systolic blood 2022 13:43:00 162 mm[Hg]                Univer

sity of



             pressure                                            [facility]

 

             Diastolic blood 2022 13:43:00 93 mm[Hg]                 Unive

rsity of



             [facility]

 

             Heart rate   2022 13:43:00 89 /min                   [facility]

[facility]

 

             Body temperature 2022 13:43:00 36.56 Amna                 [facility]

 

             Oxygen saturation in 2022 13:43:00 95 /min                   

[facility]



             Arterial blood by                                        [place] Medi

natalie



             Pulse oximetry                                        Branch

 

             Respiratory rate 2022 13:10:00 16 /min                   [facility]

 

             Body height  2022-06-10 07:26:00 154.9 cm                  [facility]

[facility]

 

             Body weight  2022-06-10 07:26:00 45.36 kg                  [facility]

 

             BMI          2022-06-10 07:26:00 18.89 kg/m2               [facility]

 

             Systolic blood 2020 20:54:00 153 mm[Hg]                Univer

sity [facility]

 

             Diastolic blood 2020 20:54:00 84 mm[Hg]                 Unive

ity [facility]

 

             Body height  2020 20:54:00 154 cm                    [facility]

[facility]

 

             Body weight  2020 20:54:00 46.811 kg                 [facility]

[facility]

 

             BMI          2020 20:54:00 19.74 kg/m2               [facility]

 

             Heart rate   2020 20:51:00 91 /min                   [facility]







Procedures







                Procedure       Date / Time     Performing Clinician Source



                                Performed                       

 

                AUTHORIZATION FOR 2022 05:01:00 Doctor Unassigned, No Univ

[facility]



                RELEASE OF PHI                  Name            Medical Branch

 

                MAGNESIUM       2022 09:33:00 Ada Kamara     [facility]

 

                BASIC METABOLIC PANEL 2022 09:33:00 Ada Kamara     Univer

[facility] [place]



                (NA, K, CL, CO2,                                 Medical Branch



                GLUCOSE, BUN,                                   



                CREATININE, CA)                                 

 

                ACTIVATED PARTIAL 2022 05:00:00 Augustin Rolle [facility]

 

                TROPONIN I      2022-06-10 20:34:00 Augustin Rolle [facility]

 

                ACTIVATED PARTIAL 2022-06-10 20:34:00 Augustin Rolle [facility]

 

                TRANSTHORACIC ECHO (TTE) 2022-06-10 19:54:00 Gómez Means   [facility]



                COMPLETE                                        [facility]

 

                NM MYOCARDIUM PERFUSION 2022-06-10 18:00:00 Augustin Rolle [facility]



                STRESS ONLY                                     [facility]

 

                MAGNESIUM       2022-06-10 09:13:00 Clary [facility]

 

                TROPONIN I      2022-06-10 09:13:00 Augustin Rolle [facility]

 

                BASIC METABOLIC PANEL 2022-06-10 09:13:00 Gómez Means   [facility]



                (NA, K, CL, CO2,                                 Medical Branch



                GLUCOSE, BUN,                                   



                CREATININE, CA)                                 

 

                LIPID PANEL     2022-06-10 09:13:00 Gómez Means   [facility]



                ([id number])(TOTAL                                   [facility]



                CHOLESTEROL,                                    



                TRIGLYCERIDES, HDL)                                 

 

                CBC WITH DIFF   2022-06-10 09:13:00 Clary [facility]

 

                GLYCOSYLATED HEMOGLOBIN 2022-06-10 09:13:00 [facility]



                (A1C)                                           [facility]

 

                PROTHROMBIN TIME / INR 2022-06-10 09:13:00 Clary [facility]

 

                ACTIVATED PARTIAL 2022-06-10 [facility]:13[facility]00 [facility]

 

                XR CHEST 2 VW   2022-06-10 01:45:00 Sofia Sprague [facility]

 

                CBC WITH DIFF   2022-06-10 00:52:00 [facility]

 

                TROPONIN I      2022-06-10 00:51:00 Sofia Sprague [facility]

 

                COMP. METABOLIC PANEL 2022-06-10 00:51:00 Sofia Sprague [facility]



                ([id number])                         [facility]

 

                N-TERMINAL PRO-BNP 2022-06-10 00:51:00 Sofia Sprague [facility]

 

                HB ECG ROUTINE & RHYTHM 2022-06-10 00:46:59 Sofia Sprague Un

iversity of 

[facility]

 

                PHYSICIAN ORDERS 2021 06:01:00 Doctor Unassigned, No Unive

[facility]

 

                CBC WITH DIFF   2021 14:42:00 Jese Aponte [place] o

f [facility]

 

                PHYSICIAN ORDERS 2021 05:01:00 Doctor Unassigned, No [facility]

[facility]

 

                ASSIGNMENT OF BENEFITS 2020 20:31:34 Doctor Unassigned, No

 [facility]







Encounters







        Start   End     Encounter Admission Attending Care    Care    Encounter 

Source



        Date/Time Date/Time Type    Type    Clinicians Facility Department ID   

   

 

        2023 Outpatient         SHERRIE,  SE    PUL     [id number]   

 MH



        14:32:00 23:59:00                 [facility]                         Southe

a



                                                                        st



                                                                        Hospita



                                                                        l

 

        2022 Outpatient         SHERRIE,  MHSE    PUL     [id number]   

 MH



        13:45:00 23:59:00                 [facility]

[facility]



                                                                        [facility]



                                                                        [facility]



                                                                        l

 

        2022-10-14 2022-10-20 Inpatient U       DREW MONROY [facility]    MED     [id number]

    MH



        17:38:00 16:17:00                                                 Southe

a



                                                                        st



                                                                        Hospita



                                                                        l

 

        2022-10-08 2022-10-08 Emergency E       VANE,  BL    [facility]    7508    

[facility]



        11:28:00 17:37:00                 RACHEL                         

 

        2022 Orders          Doctor  DILAN    1.2.840.114 [id number]

62 Univers



        00:00:00 00:00:00 Only            Unassigned, OMARI   350.1.13.10       

  ity of



                                        [facility] [id number]         [place]

as



                                                        [phone]         Medi

natalie



                                                        009             Branch

 

        2022 Transition         RAJI Will  1.2.840.114 [id number]

[address] Univers



        00:00:00 00:00:00 of Care         Lisandro ANN   350.1.13.10         

ity of



                                                [place]   4.2.7.2.686         [place]

[place]



                                                        [phone]         Medi

natalie



                                                        403             Branch

 

        2022-06-15 2022-06-15 RefIVY Dela Cruz    1.2.840.114 [id number]

68 Univers



        00:00:00 00:00:00                 Jun  PRIMARY 350.1.13.10         it

y of



                                                CARE    4.2.7.2.686         [place]

[place]



                                                PA[place] [phone]         Me

dical



                                                        390             Branch

 

        2022 Transition         RAJI Will  1.2.840.114 [id number]

[id number] Univers



        00:00:00 00:00:00 of Care         Lisandro BARKLEYY   350.1.13.10         

ity of



                                                [place]   4.2.7.2.686         [place]

[place]



                                                        [phone]         Medi

natalie



                                                        403             Branch

 

        2022 Inpatient X       ANTHONY, [facility]     [id number]

152 Univers



        19:48:00 16:00:00                 MOSTAFA                         ity of



                                                                        [facility]

 

        2022 [facility]         Sofia Sprague  1

.2.840.114 

[id number]                                Univers



        19:48:00 16:00:00 Encounter         [place]   350.1.1

3.10         ity of



                                        Anthony, Mckenna [facility] 4.2.7.2.686         [place]



                                                        [phone]         Medi

natalie



                                                        091             Branch

 

        2021 Technician         Bhakti, Eduardo [facility]    1.2.8

40.114 [id number] 

Univers



        10:15:00 10:30:00 Visit           Radha Bustillos 350.1.13.10

         ity of



                                                [place] 4.2.7.2.686         [place]

[place]



                                                ESSIO [phone]         Me

dical



                                                NAL     353             Branch



                                                BUILDING                 

 

        2021 Outpatient R       ISAEL [facility]    [id number]

[id number] Univers



        10:15:00 10:15:00                 RADHA                         itpat of



                                                                        [facility]

 

        2021 Orders          Doctor KONG    1.2.840.114 [id number]

96 Univers



        00:00:00 00:00:00 Only            Unassigned, OMARI   350.1.13.10       

  ity of



                                        [facility] 4.2.7.2.686         [place]

as



                                                        [phone]         Medi

natalie



                                                        009             Branch

 

        [facility]    [id number]

[id number] Univers



        09:45:00 09:45:00                 RADHA brown [facility]

 

        2021 Technician         Eduardo Ya [facility]    1.2.8

40.114 [id number] 

Univers



        09:27:21 09:42:21 Visit           Radha Bustillos [place] 350.1.13.10

         ity of



                                                [place] 4.2.7.2.686         [place]

[place]



                                                [place] [phone]         Me

dical



                                                nal     353             [facility]                 

 

        2021 Orders          Doctor  DILAN    1.2.840.114 [id number]

11 Univers



        00:00:00 00:00:00 Only            Unassigned, OMARI   350.1.13.10       

  ity of



                                        [facility] 4.2.7.2.686         [place]

as



                                                        [phone]         [address]

 

        2020 Office          [facility]    1.2.709.041 [id number]

6127 Univers



        15:34:08 16:20:11 Visit           [facility]  350.1.13.10         it

y of



                                                Surgical 4.2.7.2.686         [place]

as



                                                Specialti [phone]         Me

dical



                                                      198             [facility]                 

 

        2020 [facility] R       [facility]    [id number]

[id number] Univers



        15:15:00 15:15:00                 YOUSUF                           [facility]

 

        2020 Orders          Doctor  DILAN    1.2.840.114 [id number]

75 Univers



        00:00:00 00:00:00 Only            Unassigned, OMARI   350.1.13.10       

  ity of



                                        [facility] 4.2.7.2.686         [place]

as



                                                        [phone]         [address]

 

        2020 Emergency E       SOSA, DORYSE    [facility]    [id number]    





        11:42:00 11:42:00                 MAURICE                         Southe

a



                                                                        st



                                                                        Hospita



                                                                        l

 

        2020 Outpatient         YULIANA BALDERAS    PUL     [id number]   

 MH



        09:51:00 09:51:00                 MAURICE                         Southe

a



                                                                        st



                                                                        Hospita



                                                                        l

 

        2017 Outpatient         C_Byron  [facility]     [id number]

[address] [place]



        04:11:00 04:11:00                                         0609    



                                                                        Medical



                                                                        Group







Results







           Test Description Test Time  Test Comments Results    Result Comments 

Source









                    BASIC METABOLIC PANEL (NA, K, CL, CO2, GLUCOSE, BUN, 2022 10:53:23 



                    CREATININE, CA)                         









                      Test Item  Value      Reference Range Interpretation Comme

nts









             NA (test code = 5763794052) 138 mmol/L   135-145                   

 

             K (test code = 0322237493) 3.9 mmol/L   3.5-5.0                   S

light hemolysis

 

             CL (test code = 3610612787) 107 mmol/L                       

 

             CO2 TOTAL (test code = 26 mmol/L    23-31                     



             9209249138)                                         

 

             AGAP (test code =              2-16                      



             2524669668)                                         

 

             BUN (test code = 15 mg/dL     7-23                      Slight hemo

lysis



             [id number])                                         

 

             GLUCOSE (test code = 86 mg/dL                         



             5697575344)                                         

 

             CREATININE (test code = 0.44 mg/dL   0.50-1.04    L            



             4737926510)                                         

 

             CALCIUM (test code = 8.6 mg/dL    8.6-10.6                  



             2436550437)                                         

 

             eGFR (test code =              mL/min/1.73m2              



             6305526597)                                         

 

             IAN (test code = IAN) [employer]                    

       



                          Filtration Rate (GFR) and                           



                          Staging of Kidney                           



                          Disease*                               



                          +-----------------------+                           



                          ---------------------+---                           



                          ----------------------+|                           



                          GFR (mL/min/1.73 m2) ?|                           



                          With Kidney Damage ?|                           



                          ?Without Kidney                           



                          Damage+------------------                           



                          -----+-------------------                           



                          --+----------------------                           



                          ---+| ?>90 ? ? ? ? ? ? ?                           



                          ? ?| ?Stage one ? ? ? ?                           



                          ?| ? Normal ? ? ? ? ? ? ?                           



                          ?+-----------------------                           



                          +---------------------+--                           



                          -----------------------+|                           



                          ?60-89 ? ? ? ? ? ? ? ?|                           



                          ?Stage two ? ? ? ? ?| ?                           



                          Decreased GFR ? ? ? ?                           



                          +-----------------------+                           



                          ---------------------+---                           



                          ----------------------+|                           



                          ?30-59 ? ? ? ? ? ? ? ?|                           



                          ?Stage three ? ? ? ?| ?                           



                          Stage three ? ? ? ? ?                           



                          +-----------------------+                           



                          ---------------------+---                           



                          ----------------------+|                           



                          ?15-29 ? ? ? ? ? ? ? ?|                           



                          ?Stage four ? ? ? ? | ?                           



                          Stage four ? ? ? ? ?                           



                          ?+-----------------------                           



                          +---------------------+--                           



                          -----------------------+|                           



                          ?<15 (or dialysis) ? ?|                           



                          ?Stage five ? ? ? ? | ?                           



                          Stage five ? ? ? ? ?                           



                          ?+-----------------------                           



                          +---------------------+--                           



                          -----------------------+                           



                          *Each stage assumes the                           



                          associated GFR level has                           



                          been in effect for at                           



                          least three months.                           



                          ?Stages 1 to 5, with or                           



                          without kidney disease,                           



                          indicate chronic kidney                           



                          disease. Notes:                           



                          Determination of stages                           



                          one and two (with eGFR                           



                          >59mL/min/1.73 m2)                           



                          requires estimation of                           



                          kidney damage for at                           



                          least three months as                           



                          defined by structural or                           



                          functional abnormalities                           



                          of the kidney, manifested                           



                          by either:Pathological                           



                          abnormalities or Markers                           



                          of kidney damage                           



                          (including abnormalities                           



                          in the composition of the                           



                          blood or urine or                           



                          abnormalities in imaging                           



                          tests).                                

 

             Lab Interpretation (test Abnormal                               



             code = 78766-0)                                        



[facility]





             Test Item    Value        Reference Range Interpretation Comments

 

             MAGNESIUM (test code = 3682718358) 1.8 mg/dL    1.7-2.4            

       

 

             Lab Interpretation (test code = Normal                             

    



             31727-0)                                            



[facility]
 06:09:23





             Test Item    Value        Reference Range Interpretation Comments

 

             APTT Patient (test code              See_Comment  H             [Au

tomated message]



             = 3173-2)                                           The system [employer]



                                                                 generated this 

result



                                                                 transmitted ref

erence



                                                                 range: 26 - 36



                                                                 Seconds. The



                                                                 reference range

 was



                                                                 not used to int

erpret



                                                                 this result as



                                                                 normal/abnormal

.

 

             Lab Interpretation (test Abnormal                               



             code = 74363-0)                                        



[facility] 23:46:22





             Test Item    Value        Reference Range Interpretation Comments

 

             Height (test code =              in                        



             9762164761)                                         

 

             Weight (test code =              lbs                       



             9854891535)                                         

 

             Systolic BP (test code =              mmHg                      



             1587593449)                                         

 

             Diastolic BP (test code              mmHg                      



             = 9703896682)                                        

 

             LVOT stroke volume (test 45.80 cm3                              



             code = 5256942946)                                        

 

             EF (HM) (test code = 57.00 %                                



             0436637443)                                         

 

             EF(Teich) (test code = 48.60 %                                



             7436998861)                                         

 

             LVIDD (test code = 3.20 cm                                



             3552499656)                                         

 

             LVIDS (test code = 2.41 cm                                



             9698684676)                                         

 

             IVS (test code = 1.30 cm                                



             2233013996)                                         

 

             LVPWD (test code = 1.39 cm                                



             7114523753)                                         

 

             LVOT diameter (test code 1.81 cm                                



             = 0717012350)                                        

 

             FS (test code = 24 %                                   



             4057557995)                                         

 

             MV Peak E Jt (test code 76.7 cm/s                              



             = 1389184930)                                        

 

             MV Peak A Jt (test code 95.5 cm/s                              



             = 8494067776)                                        

 

             E/A ratio (test code =              ratio                     



             8659948882)                                         

 

             E wave decelartion time 0.17 s                                 



             (test code = 1793235565)                                        

 

             MV E/e' septal (test 5.4 cm/s                               



             code = 6317801028)                                        

 

             LA Volume Index (BP) 34.4 mL/m2                             



             (test code = 3350285406)                                        

 

             LA volume (BP) (test 48.5 mL                                



             code = 6958049279)                                        

 

             LVOT peak jt (test code 81.4 cm/s                              



             = 8843693550)                                        

 

             LVOT mn grad (test code              mmHg                      



             = 8627725507)                                        

 

             Left Ventricular Cardiac 3.4 L/min                              



             Output (test code =                                        



             5038749)                                            

 

             ED Current (HM) (test 60.00 %                                



             code = 2521661300)                                        

 

             ED Default (HM) (test 60.00 %                                



             code = 7208316801)                                        

 

             SV (HM) (test code = 47.00 mL                               



             7504006232)                                         

 

             BSA (test code = 1.41 m2                                



             8927742156)                                         

 

             LA size (test code = 4.2 cm                                 



             5421969733)                                         

 

             LAV(MOD-sp2) (test code 68.10 mL                               



             = 4150601868)                                        

 

             LAV(MOD-sp4) (test code 34.80 mL                               



             = 6621862826)                                        

 

             Tapse (test code = 2.15 cm                                



             4600083648)                                         

 

             AV LVOT peak gradient              mmHg                      



             (test code = 7161351198)                                        

 

             LVOT peak VTI (test code 17.9 cm                                



             = 1838683269)                                        

 

             Aortic HR (test code =              BPM                       



             4367027448)                                         

 

             LV V1 mean (test code = 54.50 cm/s                             



             2395697642)                                         

 

             MV Prop V (test code = 43.60 cm/s                             



             3102591766)                                         

 

             TR Peak Jt (test code = 230.1 cm/s                             



             3281897835)                                         

 

             Triscuspid Valve              mmHg                      



             Regurgitation Peak                                        



             Gradient (test code =                                        



             0052948057)                                         

 

             Ao root annulus (test 3.3 cm                                 



             code = 4665456795)                                        

 

             Ao root diam (test code 3.30 cm                                



             = 2436868636)                                        

 

             Aortic root (test code = 3.3 cm                                 



             4523131778)                                         

 

             PW (test code = 1.39 cm      0.6-1.1                   



             1554381722)                                         

 

             EF - 2D (test code = 48.60 %                                



             87804343)                                           

 

             Interventricular Septum 1.30 cm                                



             Diastolic Thickness by                                        



             2D (test code = 6354019)                                        

 

             Aortic valve mean 58.9 cm/s                              



             velocity (test code =                                        



             9528357309)                                         

 

             Ao peak jt (test code = 85.5 cm/s                              



             2338395867)                                         

 

             Ao VTI (test code = 18.7 cm                                



             8525285455)                                         

 

             AV area by cont VTI 2.5 cm2                                



             (test code = 7639055689)                                        

 

             AV area peak jt (test 2.4 cm2                                



             code = 6129304219)                                        

 

             Ao max PG (test code = 2.90 mm[Hg]                            



             8044980359)                                         

 

             AV peak gradient (test              mmHg                      



             code = 8160982251)                                        

 

             AV valve area (test code 2.46 cm2                               



             = 5551929282)                                        

 

             AV mean gradient (test              mmHg                      



             code = 2682528375)                                        

 

             Radiology Study                                        



             observation (narrative)                                        



             (test code = 18782-3)                                        

 

                          IAN (test code = IAN)     

                                                           



                           ?Left?Ventricle:                           



                          Left ventricle is                           



                          normal in size and                           



                          function. There is                           



                          mild concentric                           



                          hypertrophy with RWT                           



                          0.79, LV mass 142 g,                           



                          LV mass index 101                           



                          g/m2. Normal systolic                           



                          function with a                           



                          visually estimated EF                           



                          of 55 - 60%. There is                           



                          pseudonormal                           



                          diastolic                              



                                                    dysfunction.

                                                           



                           ?Tricuspid?Valve:                           



                          Mild to moderate                           



                          transvalvular                           



                          regurgitation. Right                           



                          ventricular systolic                           



                          pressure is 20-25                           



                                                    mmHg.

                                                           



                           ?Right?Ventricle:                           



                          Right ventricle is                           



                          normal in size and                           



                          function. Normal wall                           



                                                    thickness.

                                                           



                           ?IVC/SVC: IVC                           



                          diameter is less than                           



                          or equal to 21 mm and                           



                          decreases less than                           



                          50% during                             



                          inspiration;                           



                          therefore the                           



                          estimated right                           



                          atrial pressure is                           



                          intermediate (~8                           



                          mmHg). ?IVC normal in                           



                          size and respiratory                           



                                                    variation.

                                                           



                           ?Aortic?Valve:                           



                          Severely thickened                           



                          cusps. Severely                           



                          calcified cusps.                           



                          Complete                               



                          interrogation of                           



                          aortic valve was not                           



                          performed. With the                           



                          limited                                



                          interrogation, the                           



                          valve appears to open                           



                          and stenosis does not                           



                                                    appear to be severe.

                                                           



                           ?Mitral?Valve:                           



                          Moderately thickened                           



                          leaflets. Mildly                           



                          calcified leaflets.                           



                          Moderate mitral                           



                          annular                                



                          calcification. No                           



                          significant stenosis.                           



                          Tammy Dawson MD                                     



[facility]





             Test Item    Value        Reference    Interpretation Comments



                                       Range                     

 

             TROPONIN I (test 0.142 ng/mL  See_Comment  H             [Automated



             code = 2524539045)                                        message] 

The



                                                                 system which



                                                                 generated this



                                                                 result



                                                                 transmitted



                                                                 reference range

:



                                                                 <=0.034. The



                                                                 reference range



                                                                 was not used to



                                                                 interpret this



                                                                 result as



                                                                 normal/abnormal

.

 

             IAN (test code = Reference (Normal)                           



             IAN)         Range (defined by                           



                          the 99th percentile                           



                          reference limit): <=                           



                          0.034 ng/mL Note:                           



                          Cardiac troponin                           



                          begins to rise 3-4                           



                          hours after the                           



                          onset of ischemia.                           



                          Repeat in 4-6 hours                           



                          if the sample was                           



                          drawn within 3-4                           



                          hours of the onset                           



                          of the symptom and                           



                          found normal.                           



                          Diagnosis of                           



                          myocardial injury is                           



                          made with acute                           



                          changes in cTn                           



                          concentrations with                           



                          at least one serial                           



                          sample above the                           



                          99th percentile                           



                          upper reference                           



                          limit (URL), taken                           



                          together with the                           



                          patient's clinical                           



                          presentation. Biotin                           



                          has been reported to                           



                          cause a negative                           



                          bias, interpret                           



                          results relative to                           



                          patient's use of                           



                          biotin.                                

 

             Lab Interpretation Abnormal                               



             (test code =                                        



             37877-4)                                            



[facility]29





             Test Item    Value        Reference Range Interpretation Comments

 

             APTT Patient (test code =              See_Comment                [

Automated message]



             3173-2)                                             The system [employer]



                                                                 generated this 

result



                                                                 transmitted ref

erence



                                                                 range: 26 - 36



                                                                 Seconds. The re

ference



                                                                 range was not u

sed to



                                                                 interpret this 

result



                                                                 as normal/abnor

mal.

 

             Lab Interpretation (test Normal                                 



             code = 46526-7)                                        



[facility]12





             Test Item    Value        Reference    Interpretation Comments



                                       Range                     

 

             TROPONIN I (test 0.334 ng/mL  See_Comment  H             [Automated



             code = 6453081010)                                        message] 

The



                                                                 system which



                                                                 generated this



                                                                 result



                                                                 transmitted



                                                                 reference range

:



                                                                 <=0.034. The



                                                                 reference range



                                                                 was not used to



                                                                 interpret this



                                                                 result as



                                                                 normal/abnormal

.

 

             IAN (test code = Reference (Normal)                           



             IAN)         Range (defined by                           



                          the 99th percentile                           



                          reference limit): <=                           



                          0.034 ng/mL Note:                           



                          Cardiac troponin                           



                          begins to rise 3-4                           



                          hours after the                           



                          onset of ischemia.                           



                          Repeat in 4-6 hours                           



                          if the sample was                           



                          drawn within 3-4                           



                          hours of the onset                           



                          of the symptom and                           



                          found normal.                           



                          Diagnosis of                           



                          myocardial injury is                           



                          made with acute                           



                          changes in cTn                           



                          concentrations with                           



                          at least one serial                           



                          sample above the                           



                          99th percentile                           



                          upper reference                           



                          limit (URL), taken                           



                          together with the                           



                          patient's clinical                           



                          presentation. Biotin                           



                          has been reported to                           



                          cause a negative                           



                          bias, interpret                           



                          results relative to                           



                          patient's use of                           



                          biotin.                                

 

             Lab Interpretation Abnormal                               



             (test code =                                        



             55002-4)                                            



[facility] 
12:22:37





             Test Item    Value        Reference Range Interpretation Comments

 

             HGB A1C (test code = 5.7 %        4.0-5.7                   



             4548-4)                                             

 

             IAN (test code = IAN) Reference                              



                          RangesNormal:                           



                          <5.7%Prediabetes:                           



                          5.7 - 6.4%Diabetes:                           



                          > 6.5%                                 

 

             Lab Interpretation (test Normal                                 



             code = 43014-2)                                        



[facility]TOTAL CHOLESTEROL, 
TRIGLYCERIDES, HDL)2022-06-10 12:05:05





             Test Item    Value        Reference Range Interpretation Comments

 

             CHOL (test code = 138 mg/dL    120-200                   



             [id number])                                         

 

             HDL (test code = 60 mg/dL     >50                       



             6497128096)                                         

 

             HDLC RATIO (test code =              See_Comment                [Au

tomated message]



             9064000607)                                         The system [employer]



                                                                 generated this



                                                                 result transmit

nic



                                                                 reference range

:



                                                                 <=4.5. The refe

rence



                                                                 range was not u

sed



                                                                 to interpret th

is



                                                                 result as



                                                                 normal/abnormal

.

 

             TRIG (test code = 41 mg/dL                         



             2070527887)                                         

 

             LDL CHOL (test code = 70 mg/dL     See_Comment                [Auto

mated message]



             66904-2)                                            The system [employer]



                                                                 generated this



                                                                 result transmit

nic



                                                                 reference range

:



                                                                 <=160. The refe

rence



                                                                 range was not u

sed



                                                                 to interpret th

is



                                                                 result as



                                                                 normal/abnormal

.

 

             VLDL (test code = 8 mg/dL      5-60                      



             8475105423)                                         

 

             Lab Interpretation (test Normal                                 



             code = 72285-8)                                        



[facility] (NA, K, CL, CO2, 
GLUCOSE, BUN, CREATININE, CA)2022-06-10 10:13:58





             Test Item    Value        Reference Range Interpretation Comments

 

             NA (test code = 141 mmol/L   135-145                   



             6691881362)                                         

 

             K (test code = 3.3 mmol/L   3.5-5.0      L            



             9947546918)                                         

 

             CL (test code = 105 mmol/L                       



             3563975662)                                         

 

             CO2 TOTAL (test code = 33 mmol/L    23-31        H            



             3343697954)                                         

 

             AGAP (test code =              2-16                      



             4182544262)                                         

 

             BUN (test code = 20 mg/dL     7-23                      



             7953727939)                                         

 

             GLUCOSE (test code = 86 mg/dL                         



             5566378496)                                         

 

             CREATININE (test code = 0.66 mg/dL   0.50-1.04                 



             7343689330)                                         

 

             CALCIUM (test code = 8.5 mg/dL    8.6-10.6     L            



             6410079161)                                         

 

             eGFR (test code =              mL/min/1.73m2              



             0306661832)                                         

 

             IAN (test code = IAN) Association of                           



                          Glomerular Filtration                           



                          Rate (GFR) and Staging                           



                          of Kidney Disease*                           



                          +---------------------                           



                          --+-------------------                           



                          --+-------------------                           



                          ------+| GFR                           



                          (mL/min/1.73 m2) ?|                           



                          With Kidney Damage ?|                           



                          ?Without Kidney                           



                          Damage+---------------                           



                          --------+-------------                           



                          --------+-------------                           



                          ------------+| ?>90 ?                           



                          ? ? ? ? ? ? ? ?|                           



                          ?Stage one ? ? ? ? ?|                           



                          ? Normal ? ? ? ? ? ? ?                           



                          ?+--------------------                           



                          ---+------------------                           



                          ---+------------------                           



                          -------+| ?60-89 ? ? ?                           



                          ? ? ? ? ?| ?Stage two                           



                          ? ? ? ? ?| ? Decreased                           



                          GFR ? ? ? ?                            



                          +---------------------                           



                          --+-------------------                           



                          --+-------------------                           



                          ------+| ?30-59 ? ? ?                           



                          ? ? ? ? ?| ?Stage                           



                          three ? ? ? ?| ? Stage                           



                          three ? ? ? ? ?                           



                          +---------------------                           



                          --+-------------------                           



                          --+-------------------                           



                          ------+| ?15-29 ? ? ?                           



                          ? ? ? ? ?| ?Stage four                           



                          ? ? ? ? | ? Stage four                           



                          ? ? ? ? ?                              



                          ?+--------------------                           



                          ---+------------------                           



                          ---+------------------                           



                          -------+| ?<15 (or                           



                          dialysis) ? ?| ?Stage                           



                          five ? ? ? ? | ? Stage                           



                          five ? ? ? ? ?                           



                          ?+--------------------                           



                          ---+------------------                           



                          ---+------------------                           



                          -------+ *Each stage                           



                          assumes the associated                           



                          GFR level has been in                           



                          effect for at least                           



                          three months. ?Stages                           



                          1 to 5, with or                           



                          without kidney                           



                          disease, indicate                           



                          chronic kidney                           



                          disease. Notes:                           



                          Determination of                           



                          stages one and two                           



                          (with eGFR                             



                          >59mL/min/1.73 m2)                           



                          requires estimation of                           



                          kidney damage for at                           



                          least three months as                           



                          defined by structural                           



                          or functional                           



                          abnormalities of the                           



                          kidney, manifested by                           



                          either:Pathological                           



                          abnormalities or                           



                          Markers of kidney                           



                          damage (including                           



                          abnormalities in the                           



                          composition of the                           



                          blood or urine or                           



                          abnormalities in                           



                          imaging tests).                           

 

             Lab Interpretation Abnormal                               



             (test code = 95385-2)                                        



[facility] 10:13:58





             Test Item    Value        Reference Range Interpretation Comments

 

             MAGNESIUM (test code = 9862586346) 2.1 mg/dL    1.7-2.4            

       

 

             Lab Interpretation (test code = Normal                             

    



             72090-0)                                            



[facility]2022-[facility]-10 09:37:34





             Test Item    Value        Reference Range Interpretation Comments

 

             PROTIME PATIENT (test              See_Comment                [Auto

mated message]



             code = 5964-2)                                        The system wh

ich



                                                                 generated this 

result



                                                                 transmitted ref

erence



                                                                 range: 10.1 - 1

2.6



                                                                 Seconds. The re

ference



                                                                 range was not u

sed to



                                                                 interpret this 

result



                                                                 as normal/abnor

mal.

 

             INR (test code = 6301-6)                                        Nor

mal INR <1.1;



                                                                 Warfarin Therap

eutic



                                                                 range 2.0 to 3.

0 or



                                                                 2.5 to 3.5, dep

ending



                                                                 upon the indica

tions.

 

             Lab Interpretation (test Normal                                 



             code = 71863-4)                                        



[facility] 09[facility]37[facility]





             Test Item    Value        Reference Range Interpretation Comments

 

             APTT Patient (test code =              See_Comment                [

Automated message]



             3173-2)                                             The system whic

h



                                                                 generated this 

result



                                                                 transmitted ref

erence



                                                                 range: 26 - 36



                                                                 Seconds. The re

ference



                                                                 range was not u

sed to



                                                                 interpret this 

result



                                                                 as normal/abnor

mal.

 

             Lab Interpretation (test Normal                                 



             code = 12593-4)                                        



[facility] WITH DIFF2022-06-10 09:24:32





             Test Item    Value        Reference Range Interpretation Comments

 

             WBC (test code =              See_Comment                [Automated



             4290-2)                                             message] The sy

stem



                                                                 which generated



                                                                 this result



                                                                 transmitted



                                                                 reference range

:



                                                                 4.30 - 11.10



                                                                 10*3/?L. The



                                                                 reference range

 was



                                                                 not used to



                                                                 interpret this



                                                                 result as



                                                                 normal/abnormal

.

 

             RBC (test code =              See_Comment                [Automated



             429-8)                                              message] The sy

stem



                                                                 which generated



                                                                 this result



                                                                 transmitted



                                                                 reference range

:



                                                                 3.93 - 5.25



                                                                 10*6/?L. The



                                                                 reference range

 was



                                                                 not used to



                                                                 interpret this



                                                                 result as



                                                                 normal/abnormal

.

 

             HGB (test code = 11.6 g/dL    11.6-15.0                 



             718-7)                                              

 

             HCT (test code = 35.6 %       35.7-45.2    L            



             4544-3)                                             

 

             MCV (test code = 84.8 fL      80.6-95.5                 



             787-2)                                              

 

             MCH (test code = 27.6 pg      25.9-32.8                 



             785-6)                                              

 

             MCHC (test code = 32.6 g/dL    31.6-35.1                 



             786-4)                                              

 

             RDW-SD (test code = 52.8 fL      39.0-49.9    H            



             59683-0)                                            

 

             RDW-CV (test code = 17.2 %       12.0-15.5    H            



             788-0)                                              

 

             PLT (test code =              See_Comment                [Automated



             777-3)                                              message] The sy

stem



                                                                 which generated



                                                                 this result



                                                                 transmitted



                                                                 reference range

:



                                                                 166 - 358 10*3/

?L.



                                                                 The reference r

tatianna



                                                                 was not used to



                                                                 interpret this



                                                                 result as



                                                                 normal/abnormal

.

 

             MPV (test code = 10.1 fL      9.5-12.9                  



             92658-2)                                            

 

             NRBC/100 WBC (test              See_Comment                [Automat

ed



             code = 6261288975)                                        message] 

The system



                                                                 which generated



                                                                 this result



                                                                 transmitted



                                                                 reference range

:



                                                                 0.0 - 10.0 /100



                                                                 WBCs. The refer

ence



                                                                 range was not u

sed



                                                                 to interpret th

is



                                                                 result as



                                                                 normal/abnormal

.

 

             NRBC x10^3 (test code <0.01        See_Comment                [Auto

mated



             = 4783213141)                                        message] The s

ystem



                                                                 which generated



                                                                 this result



                                                                 transmitted



                                                                 reference range

:



                                                                 10*3/?L. The



                                                                 reference range

 was



                                                                 not used to



                                                                 interpret this



                                                                 result as



                                                                 normal/abnormal

.

 

             GRAN MAT (NEUT) % 66.6 %                                 



             (test code = 770-8)                                        

 

             IMM GRAN % (test code 0.20 %                                 



             = 4642393225)                                        

 

             LYMPH % (test code = 21.4 %                                 



             736-9)                                              

 

             MONO % (test code = 10.3 %                                 



             5905-5)                                             

 

             EOS % (test code = 1.1 %                                  



             713-8)                                              

 

             BASO % (test code = 0.4 %                                  



             706-2)                                              

 

             GRAN MAT x10^3(ANC) 5.71 10*3/uL 1.88-7.09                 



             (test code =                                        



             5597302573)                                         

 

             IMM GRAN x10^3 (test <0.03        0.00-0.06                 



             code = 2823004540)                                        

 

             LYMPH x10^3 (test code 1.83 10*3/uL 1.32-3.29                 



             = 731-0)                                            

 

             MONO x10^3 (test code 0.88 10*3/uL 0.33-0.92                 



             = 742-7)                                            

 

             EOS x10^3 (test code = 0.09 10*3/uL 0.03-0.39                 



             711-2)                                              

 

             BASO x10^3 (test code 0.03 10*3/uL 0.01-0.07                 



             = 704-7)                                            

 

             Lab Interpretation Abnormal                               



             (test code = 12962-8)                                        



[facility]





             Test Item    Value        Reference    Interpretation Comments



                                       Range                     

 

             TROPONIN I (test 0.005 ng/mL  See_Comment                [Automated



             code = 4244177325)                                        message] 

The



                                                                 system which



                                                                 generated this



                                                                 result



                                                                 transmitted



                                                                 reference range

:



                                                                 <=0.034. The



                                                                 reference range



                                                                 was not used to



                                                                 interpret this



                                                                 result as



                                                                 normal/abnormal

.

 

             IAN (test code = Reference (Normal)                           



             IAN)         Range (defined by                           



                          the 99th percentile                           



                          reference limit): <=                           



                          0.034 ng/mL Note:                           



                          Cardiac troponin                           



                          begins to rise 3-4                           



                          hours after the                           



                          onset of ischemia.                           



                          Repeat in 4-6 hours                           



                          if the sample was                           



                          drawn within 3-4                           



                          hours of the onset                           



                          of the symptom and                           



                          found normal.                           



                          Diagnosis of                           



                          myocardial injury is                           



                          made with acute                           



                          changes in cTn                           



                          concentrations with                           



                          at least one serial                           



                          sample above the                           



                          99th percentile                           



                          upper reference                           



                          limit (URL), taken                           



                          together with the                           



                          patient's clinical                           



                          presentation. Biotin                           



                          has been reported to                           



                          cause a negative                           



                          bias, interpret                           



                          results relative to                           



                          patient's use of                           



                          biotin.                                

 

             Lab Interpretation Normal                                 



             (test code =                                        



             52214-0)                                            



[id number]





             Test Item    Value        Reference Range Interpretation Comments

 

             NT-proBNP (test code 496 pg/mL    See_Comment  H             [Autom

ated



             = [id number])                                        message] The



                                                                 system which



                                                                 generated this



                                                                 result



                                                                 transmitted



                                                                 reference range

:



                                                                 <=450. The



                                                                 reference range



                                                                 was not used to



                                                                 interpret this



                                                                 result as



                                                                 normal/abnormal

.

 

             IAN (test code = IAN) Biotin has been                           



                          reported to                            



                          cause a negative                           



                          bias, interpret                           



                          results relative                           



                          to patient's use                           



                          of biotin.                             

 

             Lab Interpretation Abnormal                               



             (test code = 33901-7)                                        



[id number] 
01:22:49





             Test Item    Value        Reference Range Interpretation Comments

 

             NA (test code = 137 mmol/L   135-145                   



             [id number])                                         

 

             K (test code = 4.2 mmol/L   3.5-5.0                   



             8630100466)                                         

 

             CL (test code = 101 mmol/L                       



             3228439393)                                         

 

             CO2 TOTAL (test code = 28 mmol/L    23-31                     



             0795400339)                                         

 

             AGAP (test code =              2-16                      



             5997635770)                                         

 

             BUN (test code = 26 mg/dL     7-23         H            



             9701916543)                                         

 

             GLUCOSE (test code = 187 mg/dL           H            



             8675106555)                                         

 

             CREATININE (test code = 0.61 mg/dL   0.50-1.04                 



             0165765092)                                         

 

             TOTAL BILI (test code = 0.5 mg/dL    0.1-1.1                   



             2379987743)                                         

 

             CALCIUM (test code = 8.9 mg/dL    8.6-10.6                  



             9277515804)                                         

 

             T PROTEIN (test code = 6.8 g/dL     6.3-8.2                   



             6553692631)                                         

 

             ALBUMIN (test code = 3.9 g/dL     3.5-5.0                   



             8897529902)                                         

 

             ALK PHOS (test code = 46 U/L                           



             0033007418)                                         

 

             ALTv (test code = 23 U/L       5-35                      



             1742-6)                                             

 

             AST(SGOT) (test code = 38 U/L       13-40                     



             9502929067)                                         

 

             eGFR (test code =              mL/min/1.73m2              



             5238131385)                                         

 

             IAN (test code = IAN) Association of                           



                          Glomerular Filtration                           



                          Rate (GFR) and Staging                           



                          of Kidney Disease*                           



                          +---------------------                           



                          --+-------------------                           



                          --+-------------------                           



                          ------+| GFR                           



                          (mL/min/1.73 m2) ?|                           



                          With Kidney Damage ?|                           



                          ?Without Kidney                           



                          Damage+---------------                           



                          --------+-------------                           



                          --------+-------------                           



                          ------------+| ?>90 ?                           



                          ? ? ? ? ? ? ? ?|                           



                          ?Stage one ? ? ? ? ?|                           



                          ? Normal ? ? ? ? ? ? ?                           



                          ?+--------------------                           



                          ---+------------------                           



                          ---+------------------                           



                          -------+| ?60-89 ? ? ?                           



                          ? ? ? ? ?| ?Stage two                           



                          ? ? ? ? ?| ? Decreased                           



                          GFR ? ? ? ?                            



                          +---------------------                           



                          --+-------------------                           



                          --+-------------------                           



                          ------+| ?30-59 ? ? ?                           



                          ? ? ? ? ?| ?Stage                           



                          three ? ? ? ?| ? Stage                           



                          three ? ? ? ? ?                           



                          +---------------------                           



                          --+-------------------                           



                          --+-------------------                           



                          ------+| ?15-29 ? ? ?                           



                          ? ? ? ? ?| ?Stage four                           



                          ? ? ? ? | ? Stage four                           



                          ? ? ? ? ?                              



                          ?+--------------------                           



                          ---+------------------                           



                          ---+------------------                           



                          -------+| ?<15 (or                           



                          dialysis) ? ?| ?Stage                           



                          five ? ? ? ? | ? Stage                           



                          five ? ? ? ? ?                           



                          ?+--------------------                           



                          ---+------------------                           



                          ---+------------------                           



                          -------+ *Each stage                           



                          assumes the associated                           



                          GFR level has been in                           



                          effect for at least                           



                          three months. ?Stages                           



                          1 to 5, with or                           



                          without kidney                           



                          disease, indicate                           



                          chronic kidney                           



                          disease. Notes:                           



                          Determination of                           



                          stages one and two                           



                          (with eGFR                             



                          >59mL/min/1.73 m2)                           



                          requires estimation of                           



                          kidney damage for at                           



                          least three months as                           



                          defined by structural                           



                          or functional                           



                          abnormalities of the                           



                          kidney, manifested by                           



                          either:Pathological                           



                          abnormalities or                           



                          Markers of kidney                           



                          damage (including                           



                          abnormalities in the                           



                          composition of the                           



                          blood or urine or                           



                          abnormalities in                           



                          imaging tests).                           

 

             Lab Interpretation Abnormal                               



             (test code = 96238-9)                                        



[facility] WITH [id number] 01:10:25





             Test Item    Value        Reference Range Interpretation Comments

 

             WBC (test code =              See_Comment                [Automated



             6690-2)                                             message] The sy

stem



                                                                 which generated



                                                                 this result



                                                                 transmitted



                                                                 reference range

:



                                                                 4.30 - 11.10



                                                                 10*3/?L. The



                                                                 reference range

 was



                                                                 not used to



                                                                 interpret this



                                                                 result as



                                                                 normal/abnormal

.

 

             RBC (test code =              See_Comment                [Automated



             789-8)                                              message] The sy

stem



                                                                 which generated



                                                                 this result



                                                                 transmitted



                                                                 reference range

:



                                                                 3.93 - 5.25



                                                                 10*6/?L. The



                                                                 reference range

 was



                                                                 not used to



                                                                 interpret this



                                                                 result as



                                                                 normal/abnormal

.

 

             HGB (test code = 12.7 g/dL    11.6-15.0                 



             718-7)                                              

 

             HCT (test code = 39.4 %       35.7-45.2                 



             4544-3)                                             

 

             MCV (test code = 83.7 fL      80.6-95.5                 



             787-2)                                              

 

             MCH (test code = 27.0 pg      25.9-32.8                 



             785-6)                                              

 

             MCHC (test code = 32.2 g/dL    31.6-35.1                 



             786-4)                                              

 

             RDW-SD (test code = 51.8 fL      39.0-49.9    H            



             53859-4)                                            

 

             RDW-CV (test code = 17.0 %       12.0-15.5    H            



             788-0)                                              

 

             PLT (test code =              See_Comment                [Automated



             777-3)                                              message] The sy

stem



                                                                 which generated



                                                                 this result



                                                                 transmitted



                                                                 reference range

:



                                                                 166 - 358 10*3/

?L.



                                                                 The reference r

tatianna



                                                                 was not used to



                                                                 interpret this



                                                                 result as



                                                                 normal/abnormal

.

 

             MPV (test code = 10.4 fL      9.5-12.9                  



             22414-8)                                            

 

             NRBC/100 WBC (test              See_Comment                [Automat

ed



             code = 5649159749)                                        message] 

The system



                                                                 which generated



                                                                 this result



                                                                 transmitted



                                                                 reference range

:



                                                                 0.0 - 10.0 /100



                                                                 WBCs. The refer

ence



                                                                 range was not u

sed



                                                                 to interpret th

is



                                                                 result as



                                                                 normal/abnormal

.

 

             NRBC x10^3 (test code <0.01        See_Comment                [Auto

mated



             = [id number])                                        message] The s

ystem



                                                                 which generated



                                                                 this result



                                                                 transmitted



                                                                 reference range

:



                                                                 10*3/?L. The



                                                                 reference range

 was



                                                                 not used to



                                                                 interpret this



                                                                 result as



                                                                 normal/abnormal

.

 

             GRAN MAT (NEUT) % 76.8 %                                 



             (test code = 770-8)                                        

 

             IMM GRAN % (test code 0.40 %                                 



             = 9910200212)                                        

 

             LYMPH % (test code = 14.5 %                                 



             736-9)                                              

 

             MONO % (test code = 7.8 %                                  



             5905-5)                                             

 

             EOS % (test code = 0.1 %                                  



             713-8)                                              

 

             BASO % (test code = 0.4 %                                  



             706-2)                                              

 

             GRAN MAT x10^3(ANC) 5.87 10*3/uL 1.88-7.09                 



             (test code =                                        



             3938048855)                                         

 

             IMM GRAN x10^3 (test 0.03 10*3/uL 0.00-0.06                 



             code = [id number])                                        

 

             LYMPH x10^3 (test code 1.11 10*3/uL 1.32-3.29    L            



             = 731-0)                                            

 

             MONO x10^3 (test code 0.60 10*3/uL 0.33-0.92                 



             = 742-7)                                            

 

             EOS x10^3 (test code = <0.03        0.03-0.39    L            



             711-2)                                              

 

             BASO x10^3 (test code 0.03 10*3/uL 0.01-0.07                 



             = 704-7)                                            

 

             Lab Interpretation Abnormal                               



             (test code = 07298-1)                                        



[facility] WITH [id number] 14:51:56





             Test Item    Value        Reference Range Interpretation Comments

 

             WBC (test code =              See_Comment                [Automated



             2690-2)                                             message] The sy

stem



                                                                 which generated



                                                                 this result



                                                                 transmitted



                                                                 reference range

:



                                                                 4.30 - 11.10



                                                                 10*3/?L. The



                                                                 reference range

 was



                                                                 not used to



                                                                 interpret this



                                                                 result as



                                                                 normal/abnormal

.

 

             RBC (test code =              See_Comment                [Automated



             788-8)                                              message] The sy

stem



                                                                 which generated



                                                                 this result



                                                                 transmitted



                                                                 reference range

:



                                                                 3.93 - 5.25



                                                                 10*6/?L. The



                                                                 reference range

 was



                                                                 not used to



                                                                 interpret this



                                                                 result as



                                                                 normal/abnormal

.

 

             HGB (test code = 12.4 g/dL    11.6-15.0                 



             718-7)                                              

 

             HCT (test code = 39.2 %       35.7-45.2                 



             4544-3)                                             

 

             MCV (test code = 86.7 fL      80.6-95.5                 



             787-2)                                              

 

             MCH (test code = 27.4 pg      25.9-32.8                 



             785-6)                                              

 

             MCHC (test code = 31.6 g/dL    31.6-35.1                 



             786-4)                                              

 

             RDW-SD (test code = 49.2 fL      39.0-49.9                 



             26210-9)                                            

 

             RDW-CV (test code = 15.7 %       12.0-15.5    H            



             788-0)                                              

 

             PLT (test code =              See_Comment                [Automated



             777-3)                                              message] The sy

stem



                                                                 which generated



                                                                 this result



                                                                 transmitted



                                                                 reference range

:



                                                                 166 - 358 10*3/

?L.



                                                                 The reference r

tatianna



                                                                 was not used to



                                                                 interpret this



                                                                 result as



                                                                 normal/abnormal

.

 

             MPV (test code = 9.6 fL       9.5-12.9                  



             58667-3)                                            

 

             NRBC/100 WBC (test              See_Comment                [Automat

ed



             code = 4030448884)                                        message] 

The system



                                                                 which generated



                                                                 this result



                                                                 transmitted



                                                                 reference range

:



                                                                 0.0 - 10.0 /100



                                                                 WBCs. The refer

ence



                                                                 range was not u

sed



                                                                 to interpret th

is



                                                                 result as



                                                                 normal/abnormal

.

 

             NRBC x10^3 (test code <0.01        See_Comment                [Auto

mated



             = [id number])                                        message] The s

ystem



                                                                 which generated



                                                                 this result



                                                                 transmitted



                                                                 reference range

:



                                                                 10*3/?L. The



                                                                 reference range

 was



                                                                 not used to



                                                                 interpret this



                                                                 result as



                                                                 normal/abnormal

.

 

             GRAN MAT (NEUT) % 71.9 %                                 



             (test code = 770-8)                                        

 

             IMM GRAN % (test code 0.50 %                                 



             = [id number])                                        

 

             LYMPH % (test code = 17.0 %                                 



             736-9)                                              

 

             MONO % (test code = 8.0 %                                  



             5905-5)                                             

 

             EOS % (test code = 1.7 %                                  



             713-8)                                              

 

             BASO % (test code = 0.9 %                                  



             706-2)                                              

 

             GRAN MAT x10^3(ANC) 4.14 10*3/uL 1.88-7.09                 



             (test code =                                        



             5976849510)                                         

 

             IMM GRAN x10^3 (test 0.03 10*3/uL 0.00-0.06                 



             code = [id number])                                        

 

             LYMPH x10^3 (test code 0.98 10*3/uL 1.32-3.29    L            



             = 731-0)                                            

 

             MONO x10^3 (test code 0.46 10*3/uL 0.33-0.92                 



             = 742-7)                                            

 

             EOS x10^3 (test code = 0.10 10*3/uL 0.03-0.39                 



             711-2)                                              

 

             BASO x10^3 (test code 0.05 10*3/uL 0.01-0.07                 



             = 704-7)                                            

 

             Lab Interpretation Abnormal                               



             (test code = 49293-4)                                        



[facility]

## 2023-06-23 NOTE — P.HP
Certification for Inpatient


Patient admitted to: Inpatient


With expected LOS: >2 Midnights


Practitioner: I am a practitioner with admitting privileges, knowledge of 

patient current condition, hospital course, and medical plan of care.


Services: Services provided to patient in accordance with Admission requirements

found in Title 42 Section 412.3 of the Code of Federal Regulations





Patient History


Date of Service: 06/23/23


Reason for admission: SHORT OF BREATH


History of Present Illness: 





SHORT OF BREATH FOR A WEEK. DENIES CHEST PAIN.


Allergies





apixaban [From Eliquis] Adverse Reaction (Verified 06/23/23 15:12)


   Itching


levofloxacin [From Levaquin] Adverse Reaction (Verified 06/23/23 15:12)


   Itching


Sulfa (Sulfonamide Antibiotics) Adverse Reaction (Verified 06/23/23 15:12)


   Hives





Home medications list reviewed: Yes


Home Medications: 








Levalbuterol Tartrate [Xopenex Hfa] 45 mcg IN DAILY 06/23/23 


Metoprolol Succinate [Toprol Xl] 50 mg PO DAILY 06/23/23 


clonazePAM [Clonazepam] 1 mg PO BID 06/23/23 


predniSONE [Deltasone] 10 mg PO DAILY 06/23/23 








- Past Medical/Surgical History


Has patient received pneumonia vaccine in the past: No


-: AAA


-: atrial fibrilation


-: hypertension


-: polyps removed from nares


-: tubal ligation


-: oral CA "cut out"





- Social History


Smoking Status: Never smoker


Alcohol use: No


Caffeine use: Yes


Place of Residence: Home





Review of Systems


10-point ROS is otherwise unremarkable


General: Weakness





Physical Examination





- Vital Signs


Temperature: 96.9 F


Blood Pressure: 154/108


Pulse: 109


Respirations: 20


Pulse Ox (%): 98





- Physical Exam


General: Oriented x3, Cachectic, Moderate distress


HEENT: Atraumatic, PERRLA, Mucous membr. moist/pink, EOMI, Sclerae nonicteric


Neck: Supple, 2+ carotid pulse no bruit, No LAD, Without JVD or thyroid 

abnormality


Respiratory: Clear to auscultation bilaterally, Normal air movement


Cardiovascular: Abnormal S1 S2


Gastrointestinal: Normal bowel sounds, No tenderness


Musculoskeletal: No tenderness


Integumentary: No rashes


Neurological: Normal gait, Normal speech, Normal strength at 5/5 x4 extr, Normal

 tone, Normal affect


Lymphatics: No axilla or inguinal lymphadenopathy





- Studies


Laboratory Data (last 24 hrs)





06/23/23 11:18: WBC 6.80, Hgb 12.4, Hct 39.3, Plt Count 297


06/23/23 11:18: Sodium 138, Potassium 4.0, BUN 17, Creatinine 0.66, Glucose 122 

H, Magnesium 2.1, Total Bilirubin 0.6, AST 55 H,  H, Alkaline Phosphatase

 129 H








Assessment and Plan





- Problems (Diagnosis)


(1) Rapid atrial fibrillation


Current Visit: Yes   Status: Acute   


Plan: 


CHANGE TO SOTALOL 


SHE DID NOT TOLERATE ELIQUIS 


WILL TRY XARELTO. 


SHE NEEDS TO PREVENT PE OR OTHER EMBOLIC EVENTS. 


SHE IS AWARE. 








(2) Acute congestive heart failure with left ventricular diastolic dysfunction


Current Visit: Yes   Status: Acute   


Plan: 


LASIX


KCL 


LOSARTAN 


ECHO WITH DOPPLER. 








- Advance Directives


Does patient have a Living Will: No


Does patient have a Durable POA for Healthcare: No

## 2023-06-24 LAB
ALBUMIN SERPL BCP-MCNC: 2.8 G/DL (ref 3.4–5)
ALP SERPL-CCNC: 160 U/L (ref 45–117)
ALT SERPL W P-5'-P-CCNC: 202 U/L (ref 13–56)
AST SERPL W P-5'-P-CCNC: 103 U/L (ref 15–37)
BUN BLD-MCNC: 19 MG/DL (ref 7–18)
GLUCOSE SERPLBLD-MCNC: 107 MG/DL (ref 74–106)
HCT VFR BLD CALC: 37.7 % (ref 36–45)
LYMPHOCYTES # SPEC AUTO: 1 K/UL (ref 0.7–4.9)
MCV RBC: 79.6 FL (ref 80–100)
PMV BLD: 8.5 FL (ref 7.6–11.3)
POTASSIUM SERPL-SCNC: 3.5 MEQ/L (ref 3.5–5.1)
RBC # BLD: 4.74 M/UL (ref 3.86–4.86)

## 2023-06-24 RX ADMIN — POTASSIUM CHLORIDE SCH MEQ: 10 TABLET, FILM COATED, EXTENDED RELEASE ORAL at 08:04

## 2023-06-24 RX ADMIN — SOTALOL HYDROCHLORIDE SCH MG: 80 TABLET ORAL at 18:08

## 2023-06-24 RX ADMIN — LOSARTAN POTASSIUM SCH MG: 50 TABLET, FILM COATED ORAL at 08:04

## 2023-06-24 RX ADMIN — LEVALBUTEROL HYDROCHLORIDE SCH MG: 0.63 SOLUTION RESPIRATORY (INHALATION) at 02:05

## 2023-06-24 RX ADMIN — LEVALBUTEROL HYDROCHLORIDE SCH MG: 0.63 SOLUTION RESPIRATORY (INHALATION) at 20:04

## 2023-06-24 RX ADMIN — SOTALOL HYDROCHLORIDE SCH MG: 80 TABLET ORAL at 05:52

## 2023-06-24 RX ADMIN — FUROSEMIDE SCH MG: 10 INJECTION, SOLUTION INTRAVENOUS at 08:05

## 2023-06-24 RX ADMIN — Medication SCH ML: at 08:05

## 2023-06-24 RX ADMIN — FUROSEMIDE SCH MG: 10 INJECTION, SOLUTION INTRAVENOUS at 16:48

## 2023-06-24 RX ADMIN — LEVALBUTEROL HYDROCHLORIDE SCH MG: 0.63 SOLUTION RESPIRATORY (INHALATION) at 07:35

## 2023-06-24 RX ADMIN — Medication SCH ML: at 20:50

## 2023-06-24 RX ADMIN — LEVALBUTEROL HYDROCHLORIDE SCH MG: 0.63 SOLUTION RESPIRATORY (INHALATION) at 13:50

## 2023-06-24 NOTE — P.PN
Subjective


Date of Service: 06/24/23


Chief Complaint: SHORT OF BREATH


Subjective: Improving





SHE IS A LOT BETTER.  SHE DENIES ANY PAIN.  NO SE OF ANY MEDS YET.  SHE COULD 

NOT TOLERATE ELIQUIS BEFORE.  TODAY SHE WILL TRY XARELTO.





Review of Systems


10-point ROS is otherwise unremarkable





Physical Examination





- Vital Signs


Temperature: 97.8 F


Blood Pressure: 131/89


Pulse: 106


Respirations: 18


Pulse Ox (%): 97





- Physical Exam


General: Oriented x3, Cachectic, Mild distress


HEENT: Atraumatic, PERRLA, EOMI


Neck: Supple, JVD not distended


Respiratory: Clear to auscultation bilaterally, Normal air movement


Cardiovascular: Irregular heart rate/rhythm, Abnormal S1 S2


Gastrointestinal: Normal bowel sounds, No tenderness


Musculoskeletal: No tenderness


Integumentary: No rashes


Neurological: Normal speech, Normal tone, Normal affect


Lymphatics: No axilla or inguinal lymphadenopathy





- Studies


Laboratory Data (last 24 hrs)





06/23/23 11:18: WBC 6.80, Hgb 12.4, Hct 39.3, Plt Count 297


06/23/23 11:18: Sodium 138, Potassium 4.0, BUN 17, Creatinine 0.66, Glucose 122 

H, Magnesium 2.1, Total Bilirubin 0.6, AST 55 H,  H, Alkaline Phosphatase

129 H





Medications List Reviewed: Yes





Assessment And Plan





- Current Problems (Diagnosis)


(1) Rapid atrial fibrillation


Current Visit: Yes   Status: Acute   


Plan: 


CHANGE TO SOTALOL 


SHE DID NOT TOLERATE ELIQUIS 


WILL TRY XARELTO. 


SHE NEEDS TO PREVENT PE OR OTHER EMBOLIC EVENTS. 


SHE IS AWARE. 





SOTALOL DAY 2.


SHE NEEDS TO BE HERE FOR 48 HOURS AFTER STARTING IT LAST NIGHT. 


NO SE YET.  


TRY XARELTO TODAY.








(2) Acute congestive heart failure with left ventricular diastolic dysfunction


Current Visit: Yes   Status: Acute   


Plan: 


LASIX


KCL 


LOSARTAN 


ECHO WITH DOPPLER. 





LOT BETTER CLNICALLY. 


CONT MEDS. 





IT IS UNCLEAR IF SHE HAS COPD


SHE DID LIKE XOPENEX


SHE SMOKED VERY LITTLE WHEN SHE WAS YOUNG.

## 2023-06-25 RX ADMIN — LEVALBUTEROL HYDROCHLORIDE SCH MG: 0.63 SOLUTION RESPIRATORY (INHALATION) at 01:05

## 2023-06-25 RX ADMIN — POTASSIUM CHLORIDE SCH MEQ: 10 TABLET, FILM COATED, EXTENDED RELEASE ORAL at 10:28

## 2023-06-25 RX ADMIN — LEVALBUTEROL HYDROCHLORIDE SCH MG: 0.63 SOLUTION RESPIRATORY (INHALATION) at 07:35

## 2023-06-25 RX ADMIN — LEVALBUTEROL HYDROCHLORIDE SCH MG: 0.63 SOLUTION RESPIRATORY (INHALATION) at 13:55

## 2023-06-25 RX ADMIN — RIVAROXABAN SCH MG: 10 TABLET, FILM COATED ORAL at 10:27

## 2023-06-25 RX ADMIN — LOSARTAN POTASSIUM SCH MG: 50 TABLET, FILM COATED ORAL at 10:27

## 2023-06-25 RX ADMIN — FUROSEMIDE SCH MG: 10 INJECTION, SOLUTION INTRAVENOUS at 09:00

## 2023-06-25 RX ADMIN — SOTALOL HYDROCHLORIDE SCH MG: 80 TABLET ORAL at 17:12

## 2023-06-25 RX ADMIN — METHYLPREDNISOLONE SODIUM SUCCINATE SCH MG: 40 INJECTION, POWDER, FOR SOLUTION INTRAMUSCULAR; INTRAVENOUS at 17:12

## 2023-06-25 RX ADMIN — SOTALOL HYDROCHLORIDE SCH MG: 80 TABLET ORAL at 05:28

## 2023-06-25 RX ADMIN — Medication SCH ML: at 20:35

## 2023-06-25 RX ADMIN — FUROSEMIDE SCH MG: 10 INJECTION, SOLUTION INTRAVENOUS at 17:12

## 2023-06-25 RX ADMIN — Medication SCH ML: at 09:00

## 2023-06-25 RX ADMIN — METHYLPREDNISOLONE SODIUM SUCCINATE SCH MG: 40 INJECTION, POWDER, FOR SOLUTION INTRAMUSCULAR; INTRAVENOUS at 23:37

## 2023-06-25 RX ADMIN — LEVALBUTEROL HYDROCHLORIDE SCH MG: 0.63 SOLUTION RESPIRATORY (INHALATION) at 19:50

## 2023-06-25 NOTE — EKG
Test Date:    2023-06-23               Test Time:    11:05:51

Technician:   JAVIER                                     

                                                     

MEASUREMENT RESULTS:                                       

Intervals:                                           

Rate:         123                                    

NC:                                                  

QRSD:         84                                     

QT:           322                                    

QTc:          460                                    

Axis:                                                

P:                                                   

NC:                                                  

QRS:          16                                     

T:            25                                     

                                                     

INTERPRETIVE STATEMENTS:                                       

                                                     

Atrial fibrillation with rapid ventricular response

Abnormal ECG

Compared to ECG 05/06/2023 10:28:47

Sinus rhythm no longer present

Sinus arrhythmia no longer present

Ventricular premature complex(es) no longer present

ST (T wave) deviation no longer present



Electronically Signed On 06-25-23 14:23:58 CDT by Tapan Herndon

## 2023-06-25 NOTE — CON
Date of Consultation:  06/25/2023



Reason For Consultation:  Atrial fibrillation.



History Of Present Illness:  This is an 82-year-old female with past medical history of atrial fibril
lation, hypertension, AAA, presented to the emergency room with palpitation and shortness of breath o
n exertion and lower extremity edema.  Denies having any chest pain.  No nausea, vomiting, diarrhea. 
 No abdominal pain.  She was found to be in acute on chronic heart failure exacerbation as well as at
rial fibrillation with rapid ventricular response.  Started on sotalol by Dr. Schmid and Ramón.



Past Medical History:  As outlined above in HPI.



Medications:  Refer to reconciliation sheet for detailed list.



Allergies:  LEVOFLOXACIN, SULFA, AND APIXABAN.



Family History:  No premature coronary artery disease or cancer.



Social History:  Does not smoke or drink.  Does not use any drugs.



Review of Systems:

All systems reviewed and they were negative except what mentioned in HPI.



Physical Examination:

Vital Signs:  Reviewed. 

Head and Neck:  Pupils are equal, reactive to light.  Intact eye movements.  No JVD.  No cervical lym
phadenopathy.  Neck is supple.  Thyroid is not enlarged. 

Lungs:  Clear to auscultation bilaterally.  No rhonchi, wheezing, or crackles.  No accessory muscle u
se. 

Heart:  Irregularly irregular.  No extra sounds. 

Abdomen:  Soft, nontender.  Bowel sounds positive.  No organomegaly.  No masses or hernia.  No rigidi
ty or rebound. 

Extremities:  No edema, clubbing, or cyanosis.  Intact pulses. 

Skin:  No rash. 

Neurologic:  Alert, awake, oriented x3.  No acute focal deficits appreciated.



Investigations:  Troponins x3 are negative.  NT-proBNP is 2649.  BUN is 19, creatinine 0.55, hemoglob
in is 11.7.  CT of the chest; no PE, but there is ascending aorta aneurysm that is measuring 4.9 cm.



Assessment And Recommendations:  

1.Atrial fibrillation with rapid ventricular response.  Agree with sotalol at 80 mg twice a day.  Mo
nitor the EKG after the third dose to evaluate QTc interval.  If it is normal, then continue the curr
ent dose and agree with Xarelto.

2.Acute on chronic diastolic heart failure exacerbation.  Agree with Lasix and potassium supplements
.  Re-evaluate BUN, creatinine, and electrolytes tomorrow.

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  Plan for very close monitoring over the next 6
 months with repeat CT scan.  If it does increase in size, then we will consult CT Surgery for repair
.





/JORGE

DD:  06/25/2023 19:08:01Voice ID:  [id number]

DT:  06/25/2023 19:47:23Report ID:  [id number]

## 2023-06-25 NOTE — RAD REPORT
EXAM DESCRIPTION:  CT - Abdomen W Contrast

 

CLINICAL HISTORY:  cholestasis

Abdominal pain

 

COMPARISON:  Chest For Pe Angio dated 6/23/2023

 

TECHNIQUE  All CT scans are performed using dose optimization technique as appropriate and may includ
e automated exposure control or mA/KV adjustment according to patient size.

 

FINDINGS:  Moderate bilateral pleural effusions with atelectasis in both lung bases.  4.4 cm ascendin
g aortic aneurysm.

 

Heterogenous appearance of the liver parenchyma. No intra or extrahepatic biliary tree dilatation. Th
e spleen, pancreas, adrenal glands and kidneys are within normal limits.

 

No free fluid, bowel obstruction or free air. Moderate stool is present throughout the colon.

 

No significant adenopathy in the abdomen.

 

No significant bony finding.

 

 

IMPRESSION:  Heterogenous appearance of the liver parenchyma. No solid liver mass or intra/extrahepat
ic biliary dilatation.

## 2023-06-25 NOTE — P.PN
Subjective


Date of Service: 06/25/23


Chief Complaint: SHORT OF BREATH


Subjective: No new changes





SHE IS A LOT BETTER.  SHE DENIES ANY PAIN.  NO SE OF ANY MEDS YET.  SHE COULD 

NOT TOLERATE ELIQUIS BEFORE.  TODAY SHE WILL TRY XARELTO.





STILL COMPLAINS OF DYSPNEA. 


DENIES PAIN


WILL NEED NEBS AND MEDS.  WILL NEED ONEMORE DAY HERE FOR IT. 





Review of Systems


10-point ROS is otherwise unremarkable


General: Weakness





Physical Examination





- Vital Signs


Temperature: 97.1 F


Blood Pressure: 120/86


Pulse: 91


Respirations: 16


Pulse Ox (%): 96





- Physical Exam


General: Oriented x3, Cachectic, Mild distress, Moderate distress


HEENT: Atraumatic, PERRLA, EOMI


Neck: Supple, JVD not distended


Respiratory: Clear to auscultation bilaterally, Normal air movement


Cardiovascular: Regular rate/rhythm, Normal S1 S2


Gastrointestinal: Normal bowel sounds, No tenderness


Musculoskeletal: No tenderness


Integumentary: No rashes


Neurological: Normal speech, Normal tone, Normal affect


Lymphatics: No axilla or inguinal lymphadenopathy





- Studies


Medications List Reviewed: Yes





Assessment And Plan





- Current Problems (Diagnosis)


(1) Rapid atrial fibrillation


Current Visit: Yes   Status: Acute   


Plan: 


CHANGE TO SOTALOL 


SHE DID NOT TOLERATE ELIQUIS 


WILL TRY XARELTO. 


SHE NEEDS TO PREVENT PE OR OTHER EMBOLIC EVENTS. 


SHE IS AWARE. 





SOTALOL DAY 2.


SHE NEEDS TO BE HERE FOR 48 HOURS AFTER STARTING IT LAST NIGHT. 


NO SE YET.  


TRY XARELTO TODAY.








(2) Acute congestive heart failure with left ventricular diastolic dysfunction


Current Visit: Yes   Status: Acute   


Plan: 


LASIX


KCL 


LOSARTAN 


ECHO WITH DOPPLER. 





LOT BETTER CLNICALLY. 


CONT MEDS. 





IT IS UNCLEAR IF SHE HAS COPD


SHE DID LIKE XOPENEX


SHE SMOKED VERY LITTLE WHEN SHE WAS YOUNG. 








(3) COPD (chronic obstructive pulmonary disease)


Current Visit: Yes   Status: Chronic   


Plan: 


NON SMOKER 


DID WELL ON XOPENEX. 


IN AM SW WILL NEED TO WORK ON NEBULIZER AND MEDS. 


TRIAL OF STEROIDS.

## 2023-06-26 VITALS — DIASTOLIC BLOOD PRESSURE: 90 MMHG | TEMPERATURE: 97 F | SYSTOLIC BLOOD PRESSURE: 132 MMHG

## 2023-06-26 VITALS — OXYGEN SATURATION: 96 %

## 2023-06-26 RX ADMIN — POTASSIUM CHLORIDE SCH MEQ: 10 TABLET, FILM COATED, EXTENDED RELEASE ORAL at 08:21

## 2023-06-26 RX ADMIN — SOTALOL HYDROCHLORIDE SCH MG: 80 TABLET ORAL at 06:02

## 2023-06-26 RX ADMIN — LOSARTAN POTASSIUM SCH MG: 50 TABLET, FILM COATED ORAL at 08:22

## 2023-06-26 RX ADMIN — RIVAROXABAN SCH MG: 10 TABLET, FILM COATED ORAL at 08:22

## 2023-06-26 RX ADMIN — METHYLPREDNISOLONE SODIUM SUCCINATE SCH MG: 40 INJECTION, POWDER, FOR SOLUTION INTRAMUSCULAR; INTRAVENOUS at 06:02

## 2023-06-26 RX ADMIN — FUROSEMIDE SCH MG: 10 INJECTION, SOLUTION INTRAVENOUS at 08:24

## 2023-06-26 RX ADMIN — LEVALBUTEROL HYDROCHLORIDE SCH MG: 0.63 SOLUTION RESPIRATORY (INHALATION) at 08:00

## 2023-06-26 RX ADMIN — Medication SCH ML: at 08:25

## 2023-06-26 RX ADMIN — LEVALBUTEROL HYDROCHLORIDE SCH MG: 0.63 SOLUTION RESPIRATORY (INHALATION) at 01:20

## 2023-06-26 NOTE — P.DS
Admission Date: 06/25/23


Discharge Date: 06/26/23


Disposition: ROUTINE DISCHARGE


Reason for Admission: SHORT OF BREATH





- Problems


(1) Rapid atrial fibrillation


Status: Acute   





(2) Acute congestive heart failure with left ventricular diastolic dysfunction


Status: Acute   





(3) COPD (chronic obstructive pulmonary disease)


Status: Chronic   


Brief History of Present Illness: 





SHORT OF BREATH FOR A WEEK. DENIES CHEST PAIN.


Hospital Course: 





MS. MCKNIGHT COMES WITH DYSPNEA AND A FIB.  SHE IMPROVES SOME ON NEBS, SOTALOL, 

LOW DOSE LASIX AND IS STABLE TO GO HOME.  SHE CAN'T TOLERATE ALBUTEROL.  I 

ORDERED XOPENEX .  SHE ALSO GOES TO PULMOANRY MD


Vital Signs/Physical Exam: 














Temp Pulse Resp BP Pulse Ox


 


 97.0 F   101 H  16   132/90   96 


 


 06/26/23 08:00  06/26/23 08:00  06/26/23 08:00  06/26/23 08:00  06/26/23 08:00








Laboratory Data at Discharge: 














WBC  5.50 thou/uL (4.3-10.9)   06/24/23  08:09    


 


Hgb  11.7 g/dL (12.0-15.0)  L  06/24/23  08:09    


 


Hct  37.7 % (36.0-45.0)   06/24/23  08:09    


 


Plt Count  269 thou/uL (152-406)   06/24/23  08:09    


 


Sodium  140 mEq/L (136-145)   06/24/23  08:09    


 


Potassium  3.5 mEq/L (3.5-5.1)   06/24/23  08:09    


 


BUN  19 mg/dL (7-18)  H  06/24/23  08:09    


 


Creatinine  0.55 mg/dL (0.55-1.02)   06/24/23  08:09    


 


Glucose  107 mg/dL ()  H  06/24/23  08:09    


 


Magnesium  2.1 mg/dL (1.6-2.4)   06/23/23  11:18    


 


Total Bilirubin  0.6 mg/dL (0.2-1.0)   06/24/23  08:09    


 


AST  103 U/L (15-37)  H  06/24/23  08:09    


 


ALT  202 U/L (13-56)  H  06/24/23  08:09    


 


Alkaline Phosphatase  160 U/L ()  H D 06/24/23  08:09    








Home Medications: 








Levalbuterol Tartrate [Xopenex Hfa] 45 mcg IN DAILY 06/23/23 


Metoprolol Succinate [Toprol Xl] 50 mg PO DAILY 06/23/23 


clonazePAM [Clonazepam] 1 mg PO BID 06/23/23 


predniSONE [Deltasone] 10 mg PO DAILY 06/23/23 





Physician Discharge Instructions: 


PROBLEM: Atrial Fibrillation


Congestive Heart Failure





GOAL: Clear understanding of disease process





INSTRUCTIONS:


Stop taking metoprolol at home


New Rx for following medications have been sent to [facility] in 


Sotalol 80mg by mouth twice daily


Xarelto 15mg by mouth once daily


Losartan 25mg by mouth once daily


Furosemide 20mg by mouth once daily


Potassium Chloride 10meq by mouth once daily


Xoepenex nebulizer treatments as needed





Diet: Heart Healthy, Low Sodium





Activity: As Tolerated








DME


DME:


Date Ordered: 


Name of Company: 





[employer]


Services Needed: 


Name of Company: 


Date or Referral: 





IMMUNIZATION


Influenza Vaccine Indicated: 


Influenza Vaccine Given: 


Date Given: 





Pneumonia Vaccine Indicated: Yes


Pneumonia Vaccine Given: 


Date Given: 


Followup: 


Travis Schmid MD [Primary Care Provider] - 1 Week

## 2023-08-11 ENCOUNTER — HOSPITAL ENCOUNTER (EMERGENCY)
Dept: HOSPITAL 97 - ER | Age: 83
Discharge: HOME | End: 2023-08-11
Payer: COMMERCIAL

## 2023-08-11 VITALS — SYSTOLIC BLOOD PRESSURE: 129 MMHG | DIASTOLIC BLOOD PRESSURE: 81 MMHG

## 2023-08-11 VITALS — OXYGEN SATURATION: 95 %

## 2023-08-11 VITALS — TEMPERATURE: 98.4 F

## 2023-08-11 DIAGNOSIS — I48.19: ICD-10-CM

## 2023-08-11 DIAGNOSIS — Z88.2: ICD-10-CM

## 2023-08-11 DIAGNOSIS — R11.2: ICD-10-CM

## 2023-08-11 DIAGNOSIS — Z88.1: ICD-10-CM

## 2023-08-11 DIAGNOSIS — Z88.8: ICD-10-CM

## 2023-08-11 DIAGNOSIS — K52.9: Primary | ICD-10-CM

## 2023-08-11 DIAGNOSIS — Z79.01: ICD-10-CM

## 2023-08-11 DIAGNOSIS — I10: ICD-10-CM

## 2023-08-11 LAB
ALBUMIN SERPL BCP-MCNC: 2.9 G/DL (ref 3.4–5)
ALP SERPL-CCNC: 77 U/L (ref 45–117)
ALT SERPL W P-5'-P-CCNC: 32 U/L (ref 13–56)
ANISOCYTOSIS BLD QL: (no result)
AST SERPL W P-5'-P-CCNC: 25 U/L (ref 15–37)
BILIRUB INDIRECT SERPL-MCNC: 0.3 MG/DL (ref 0.2–0.8)
BLD SMEAR INTERP: (no result)
BUN BLD-MCNC: 25 MG/DL (ref 7–18)
GLUCOSE SERPLBLD-MCNC: 133 MG/DL (ref 74–106)
HCT VFR BLD CALC: 39 % (ref 36–45)
LIPASE SERPL-CCNC: 29 U/L (ref 13–75)
LYMPHOCYTES # SPEC AUTO: 0.3 K/UL (ref 0.7–4.9)
MAGNESIUM SERPL-MCNC: 1.7 MG/DL (ref 1.6–2.4)
MCV RBC: 81.6 FL (ref 80–100)
MORPHOLOGY BLD-IMP: (no result)
PMV BLD: 7.7 FL (ref 7.6–11.3)
POTASSIUM SERPL-SCNC: 3.5 MEQ/L (ref 3.5–5.1)
RBC # BLD: 4.78 M/UL (ref 3.86–4.86)
TROPONIN I SERPL HS-MCNC: 14.9 PG/ML (ref ?–58.9)
WBC # BLD AUTO: 10.4 THOU/UL (ref 4.3–10.9)

## 2023-08-11 PROCEDURE — 80048 BASIC METABOLIC PNL TOTAL CA: CPT

## 2023-08-11 PROCEDURE — 96374 THER/PROPH/DIAG INJ IV PUSH: CPT

## 2023-08-11 PROCEDURE — 81001 URINALYSIS AUTO W/SCOPE: CPT

## 2023-08-11 PROCEDURE — 83690 ASSAY OF LIPASE: CPT

## 2023-08-11 PROCEDURE — 85025 COMPLETE CBC W/AUTO DIFF WBC: CPT

## 2023-08-11 PROCEDURE — 71045 X-RAY EXAM CHEST 1 VIEW: CPT

## 2023-08-11 PROCEDURE — 99284 EMERGENCY DEPT VISIT MOD MDM: CPT

## 2023-08-11 PROCEDURE — 80076 HEPATIC FUNCTION PANEL: CPT

## 2023-08-11 PROCEDURE — 83735 ASSAY OF MAGNESIUM: CPT

## 2023-08-11 PROCEDURE — 74177 CT ABD & PELVIS W/CONTRAST: CPT

## 2023-08-11 PROCEDURE — 96361 HYDRATE IV INFUSION ADD-ON: CPT

## 2023-08-11 PROCEDURE — 84484 ASSAY OF TROPONIN QUANT: CPT

## 2023-08-11 PROCEDURE — 36415 COLL VENOUS BLD VENIPUNCTURE: CPT

## 2023-08-11 PROCEDURE — 93005 ELECTROCARDIOGRAM TRACING: CPT

## 2023-08-11 NOTE — ER
Nurse's Notes                                                                                     

 [facility]                                                                 

Name: Toi Dhillon                                                                            

Age: 82 yrs                                                                                       

Sex: Female                                                                                       

: 1940                                                                                   

MRN: [id number]                                                                                   

Arrival Date: 2023                                                                          

Time: 13:52                                                                                       

Account#: [id number]                                                                            

Bed 2                                                                                             

Private MD:                                                                                       

Diagnosis: Gastroenteritis;Atrial fibrillation with rapid ventricular rate                        

                                                                                                  

Presentation:                                                                                     

                                                                                             

13:57 Chief complaint: EMS states: n/v/abd pain since this morning, also has ongoing anterior iw  

      wall chest pain since  after Watchman procedure. Coronavirus screen: At this         

      time, the client does not indicate any symptoms associated with coronavirus-19. Ebola       

      Screen: Patient negative for fever greater than or equal to 101.5 degrees Fahrenheit,       

      and additional compatible Ebola Virus Disease symptoms Patient denies exposure to           

      infectious person. Patient denies travel to an Ebola-affected area in the 21 days           

      before illness onset. No symptoms or risks identified at this time. Risk Assessment: Do     

      you want to hurt yourself or someone else? Patient reports no desire to harm self or        

      others. Onset of symptoms was 2023.                                              

13:57 Method Of Arrival: EMS                                                                  iw  

13:57 Acuity: EDUARDO 3                                                                           iw  

15:01 Initial Sepsis Screen: Does the patient meet any 2 criteria? No. Patient's initial      iw  

      sepsis screen is negative. Does the patient have a suspected source of infection? No.       

      Patient's initial sepsis screen is negative. Care prior to arrival: Medication(s)           

      given: zofran 4 mg, IV initiated. 20 GA, in the right antecubital area.                     

                                                                                                  

Triage Assessment:                                                                                

14:05 General: Behavior is calm, cooperative.                                                 iw  

                                                                                                  

Historical:                                                                                       

- Allergies:                                                                                      

13:58 Eliquis;                                                                                iw  

13:58 Levaquin;                                                                               iw  

13:58 Sulfa (Sulfonamide Antibiotics);                                                        iw  

- Home Meds:                                                                                      

13:58 diltiazem HCl 120 mg Oral Capsule, ER 12 hr once [Active]; Eliquis 2.5 mg Oral tablet 2 iw  

      times per day [Active];                                                                     

- PMHx:                                                                                           

13:58 AAA; Atrial fibrillation; Hypertensive disorder;                                        iw  

                                                                                                  

- Immunization history:: Adult Immunizations unknown.                                             

- Family history:: not pertinent.                                                                 

- Social history:: Smoking status: unknown.                                                       

                                                                                                  

                                                                                                  

Screenin:13 [facility] ED Fall Risk Assessment (Adult) Score/Fall Risk Level 0 - 2 = Low Risk. Abuse  iw  

      screen: Denies threats or abuse. Denies injuries from another. Nutritional screening:       

      No deficits noted. Tuberculosis screening: No symptoms or risk factors identified.          

                                                                                                  

Assessment:                                                                                       

14:05 General: Appears in no apparent distress. comfortable. Pain: Complains of pain in       iw  

      chest. Neuro: Level of Consciousness is awake, alert, obeys commands, Oriented to           

      person, place, time, situation, Moves all extremities. Full function. Cardiovascular:       

      Patient's skin is warm and dry. Respiratory: Respiratory effort is even, unlabored,         

      Respiratory pattern is regular, symmetrical. GI: Abdomen is flat, non-distended,            

      Reports nausea, vomiting. Derm: Skin is intact, is fragile, is thin. Musculoskeletal:       

      Range of motion:.                                                                           

16:13 Reassessment: Patient appears in no apparent distress at this time. Patient and/or      iw  

      family updated on plan of care and expected duration. Pain level reassessed. Patient is     

      alert, oriented x 3, equal unlabored respirations, skin warm/dry/pink.                      

16:15 Reassessment: A-fib with RVR on monitor between 108 to 140bpm, Dr. Dempsey notified. aa5 

      .                                                                                           

16:20 Reassessment: Patient is alert, oriented x 3, equal unlabored respirations, skin        aa5 

      warm/dry/pink. A-fib with RVR up to 177bpm at this time. Pt calm and only c/o nausea at     

      this time, denies palpitations. Repeat EKG was completed. .                                 

16:24 Reassessment: Dr. Dempsey at bedside. .                                              aa5 

16:25 Reassessment: Pt states "my heart rate does that at home, it goes fast sometimes and    aa5 

      Dr. Schmid cut my Sotalol to 1/4 of a tab". Pt now takes Sotalol 20 mg instead of 80 mg,     

      pt states "Dr. Schmid said the Sotalol 80 mg was too much".                                  

16:35 Reassessment: Pt took Sotalol 20 mg PO from home medications, okay 'd by Dr. rubina Dempsey. Pt also took losartan, furosemide, potassium chloride, and xarelto from         

      home medications. .                                                                         

16:58 Reassessment: Patient is alert, oriented x 3, equal unlabored respirations, skin        aa5 

      warm/dry/pink. Cardiovascular: Rhythm is atrial fibrillation.                               

                                                                                                  

Vital Signs:                                                                                      

15:00  / 84; Pulse 102; Resp 19 S; Temp 98.4; Pulse Ox 100% on R/A;                     iw  

16:15  / 72; Pulse 140; Resp 16 S; Pulse Ox 95% on R/A;                                 aa5 

16:20  / 85; Pulse 177; Resp 16 S; Pulse Ox 95% on R/A;                                 aa5 

16:40  / 81; Pulse 156; Resp 17 S; Pulse Ox 94% on R/A;                                 aa5 

16:58 Pulse 109; Resp 16 S; Pulse Ox 95% on R/A;                                              aa5 

16:20 HR fluctuating between 150 to 177bpm.                                                   aa5 

                                                                                                  

ED Course:                                                                                        

13:55 Patient arrived in ED.                                                                  eb  

13:58 Devendra Dempsey MD is Attending Physician.                                            rt  

13:58 Triage completed.                                                                       iw  

13:59 Yashira Muhammad, RN is Primary Nurse.                                                   iw  

13:59 Arm band placed on.                                                                     iw  

14:00 Maintain EMS IV. Dressing intact. Good blood return noted. Site clean \T\ dry. Gauge \T\    iw

      site: 20 RAC.                                                                               

14:05 Initial lab(s) drawn, by me, sent to lab.                                               iw  

14:47 XRAY Chest (1 view) In Process Unspecified.                                             EDMS

14:53 CT Abd/Pelvis - IV Contrast Only In Process Unspecified.                                EDMS

15:37 [facility] Sent.                                                                               iw  

16:14 Patient has correct armband on for positive identification.                             iw  

18:28 Provided Education on: .                                                                iw  

18:28 No provider procedures requiring assistance completed. IV discontinued, intact,         iw  

      bleeding controlled, No redness/swelling at site. Pressure dressing applied.                

                                                                                                  

Administered Medications:                                                                         

15:52 Drug: Ondansetron IVP 4 mg Route: IVP; Site: right antecubital;                         iw  

18:27 Follow up: Response: No adverse reaction; Nausea is decreased                           iw  

17:01 Drug: NS 0.9%  ml Route: IV; Rate: bolus; Site: right antecubital;                aa5 

18:27 Follow up: IV Status: Completed infusion                                                iw  

                                                                                                  

                                                                                                  

Medication:                                                                                       

18:28 VIS not applicable for this client.                                                     iw  

                                                                                                  

Outcome:                                                                                          

17:31 Discharge ordered by MD.                                                                rt  

18:28 Discharged to                                                                           iw  

18:45 Discharged to home via wheelchair, with family.                                         hb  

18:45 Condition: stable                                                                           

18:45 Discharge instructions given to patient, family, Instructed on discharge instructions,      

      follow up and referral plans. medication usage, Demonstrated understanding of               

      instructions, follow-up care, medications, Prescriptions given X 2.                         

18:46 Patient left the ED.                                                                      

                                                                                                  

Signatures:                                                                                       

Dispatcher [employer]                           EDYashira Hartman RN RN                                                      

Amelia Veloz RN RN   aa5                                                  

Irena Lomeli RN RN hb Botello, Elizabeth eb [place], Ryan, MD MD   rt                                                   

                                                                                                  

**************************************************************************************************

## 2023-08-11 NOTE — RAD REPORT
EXAM DESCRIPTION:  CTAbdomen   Pelvis W Contrast - 8/11/2023 2:51 pm

 

CLINICAL HISTORY:  Abdominal pain.

ABD PAIN

 

COMPARISON:  <Comparisons>

 

TECHNIQUE:  Biphasic CT imaging of the abdomen and pelvis was performed with 100 ml non-ionic IV cont
rast.

 

All CT scans are performed using dose optimization technique as appropriate and may include automated
 exposure control or mA/KV adjustment according to patient size.

 

FINDINGS:  The lung bases are clear.

 

The liver demonstrates diffuse fatty infiltration with small low-density cysts. Spleen, pancreas, adr
enal glands and kidneys are within normal limits.

 

No bowel obstruction, free air, or abscess. Mild pelvic free fluid. Significant enhancement is seen o
f the wall of numerous small bowel loops. No pneumatosis or bowel obstruction. No evidence of signifi
cant lymphadenopathy.

 

No suspicious bony findings.

 

IMPRESSION:  Small bowel loops demonstrate wall thickening and mucosal enhancement. This is moderatel
y severe diffuse in nature throughout the small bowel loops. Enteritis would be the most likely etiol
ogy. However, the pattern is generally nonspecific. There is no finding to raise suspicion of ischemi
c etiology.

 

Mild pelvic free fluid.

## 2023-08-11 NOTE — EDPHYS
Physician Documentation                                                                           

 [facility]                                                                 

Name: Toi Dhillon                                                                            

Age: 82 yrs                                                                                       

Sex: Female                                                                                       

: 1940                                                                                   

MRN: [id number]                                                                                   

Arrival Date: 2023                                                                          

Time: 13:52                                                                                       

Account#: [id number]                                                                            

Bed 2                                                                                             

Private MD:                                                                                       

ED Physician Devendra Dempsey                                                                     

HPI:                                                                                              

                                                                                             

14:18 This 82 yrs old Female presents to ER via EMS with complaints of Nausea/Vomiting,       rt  

      Abdominal Pain, Chest Pain.                                                                 

14:18 Patient had a Watchman procedure performed on  of this year at [facility]. The patient reports that she has had a chest pain since the procedure, is not         

      appreciably worsened. He states that the main reason that she called EMS today was for      

      nausea, vomiting, generalized abdominal pain that started today. Patient states that        

      last night, she ate a hamburger that she believes that may have been bad. Another           

      family member who ate the same meat has had similar symptoms. She denies other acute        

      complaints at this time. Pain is aching nature, nonradiating, moderate severity, no         

      other aggravating or alleviating factors. Patient did receive Zofran via EMS, she           

      states that she no longer has any nausea or abdominal pain..                                

                                                                                                  

Historical:                                                                                       

- Allergies:                                                                                      

13:58 Eliquis;                                                                                iw  

13:58 Levaquin;                                                                               iw  

13:58 Sulfa (Sulfonamide Antibiotics);                                                        iw  

- Home Meds:                                                                                      

13:58 diltiazem HCl 120 mg Oral Capsule, ER 12 hr once [Active]; Eliquis 2.5 mg Oral tablet 2 iw  

      times per day [Active];                                                                     

- PMHx:                                                                                           

13:58 AAA; Atrial fibrillation; Hypertensive disorder;                                        iw  

                                                                                                  

- Immunization history:: Adult Immunizations unknown.                                             

- Family history:: not pertinent.                                                                 

- Social history:: Smoking status: unknown.                                                       

                                                                                                  

                                                                                                  

ROS:                                                                                              

14:18 Constitutional: Negative for fever, chills, and weight loss.                            rt  

14:18 Respiratory: Negative for shortness of breath, cough, wheezing, and pleuritic chest         

      pain, MS/Extremity: Negative for injury and deformity, Skin: Negative for injury, rash,     

      and discoloration, Neuro: Negative for headache, weakness, numbness, tingling, and          

      seizure, Psych: Negative for depression, anxiety, suicide ideation, homicidal ideation,     

      and hallucinations.                                                                         

14:18 Cardiovascular: Positive for chest pain, Negative for edema.                                

14:18 Abdomen/GI: Positive for abdominal pain, nausea and vomiting.                               

                                                                                                  

Exam:                                                                                             

14:18 Constitutional:  This is a well developed, well nourished patient who is awake, alert,  rt  

      and in no acute distress. Head/Face:  Normocephalic, atraumatic. Chest/axilla:  Normal      

      chest wall appearance and motion.  Nontender with no deformity.  No lesions are             

      appreciated. Cardiovascular:  Regular rate and rhythm with a normal S1 and S2.  No          

      gallops, murmurs, or rubs.  Normal PMI, no JVD.  No pulse deficits. Respiratory:  Lungs     

      have equal breath sounds bilaterally, clear to auscultation and percussion.  No rales,      

      rhonchi or wheezes noted.  No increased work of breathing, no retractions or nasal          

      flaring. Abdomen/GI:  Soft, non-tender, with normal bowel sounds.  No distension or         

      tympany.  No guarding or rebound.  No evidence of tenderness throughout. Skin:  Warm,       

      dry with normal turgor.  Normal color with no rashes, no lesions, and no evidence of        

      cellulitis. MS/ Extremity:  Pulses equal, no cyanosis.  Neurovascular intact.  Full,        

      normal range of motion. Neuro:  Awake and alert, GCS 15, oriented to person, place,         

      time, and situation.  Cranial nerves II-XII grossly intact.  Motor strength 5/5 in all      

      extremities.  Sensory grossly intact.  Cerebellar exam normal.  Normal gait. Psych:         

      Awake, alert, with orientation to person, place and time.  Behavior, mood, and affect       

      are within normal limits.                                                                   

15:15 ECG was reviewed by the Attending Physician.                                            rt  

17:33 ECG was reviewed by the Attending Physician.                                            rt  

                                                                                                  

Vital Signs:                                                                                      

15:00  / 84; Pulse 102; Resp 19 S; Temp 98.4; Pulse Ox 100% on R/A;                     iw  

16:15  / 72; Pulse 140; Resp 16 S; Pulse Ox 95% on R/A;                                 aa5 

16:20  / 85; Pulse 177; Resp 16 S; Pulse Ox 95% on R/A;                                 aa5 

16:40  / 81; Pulse 156; Resp 17 S; Pulse Ox 94% on R/A;                                 aa5 

16:58 Pulse 109; Resp 16 S; Pulse Ox 95% on R/A;                                              aa5 

16:20 HR fluctuating between 150 to 177bpm.                                                   aa5 

                                                                                                  

MDM:                                                                                              

13:58 Patient medically screened.                                                             rt  

17:33 Differential diagnosis: Gastroenteritis, colitis, dehydration, A-fib. Data reviewed:    rt  

      vital signs, nurses notes, lab test result(s), EKG, radiologic studies. Consideration       

      of Admission/Observation Escalation of care including admission/observation considered.     

      Throughout her stay in the ED, the patient went from having a sinus tachycardia at a        

      rate at about 100 to have an atrial fibrillation with rapid ventricular rate. Patient       

      states that she is due to take her sotalol dose which was given to her. This did take       

      her out of A-fib and did improve her rate to about 100 again. The patient had no            

      worsening chest pain since then. She has had a chest pain that is unchanged for several     

      weeks following a Watchman procedure. This was unchanged, her primary visit today was       

      due to nausea, vomiting which she attributes to eating bad food, possibly food              

      poisoning. The symptoms have significantly improved with antiemetics in the ED. CT scan     

      does show findings consistent with an enteritis, will treat empirically for bacterial       

      etiology given exposure to hamburger. I did offer patient admission to the hospital for     

      observation due to the A-fib, she states that she is strongly desirous of discharge,        

      states that her heart rate always increases when she is due for sotalol. Patient to         

      follow-up with her primary care provider as an outpatient. Strict return precautions        

      were given to patient. Labs are benign, not consistent with sepsis.. I considered the       

      following discharge prescriptions or medication management in the emergency department      

      Medications were administered in the Emergency Department. See MAR. Independent             

      interpretation of the following test(s) in the Emergency Department CT Scan: My             

      interpretation is No bowel obstruction seen on interpretation of the CT scan images.        

      Care significantly affected by the following chronic conditions: Atrial fibrillation.       

      Counseling: I had a detailed discussion with the patient and/or guardian regarding: the     

      historical points, exam findings, and any diagnostic results supporting the                 

      discharge/admit diagnosis, lab results, radiology results, the need for outpatient          

      follow up, to return to the emergency department if symptoms worsen or persist or if        

      there are any questions or concerns that arise at home. Response to treatment: the          

      patient's symptoms have markedly improved after treatment.                                  

                                                                                                  

                                                                                             

13:59 Order name: Basic Metabolic Panel; Complete Time: 15:01                                 rt  

                                                                                             

13:59 Order name: CBC with Diff; Complete Time: 16:33                                         rt  

                                                                                             

13:59 Order name: LFT's; Complete Time: 15:01                                                 rt  

                                                                                             

13:59 Order name: Magnesium; Complete Time: 15:01                                             rt  

                                                                                             

13:59 Order name: Troponin HS; Complete Time: 15:01                                           rt  

                                                                                             

13:59 Order name: Lipase; Complete Time: 15:01                                                rt  

                                                                                             

13:59 Order name: UAM; Complete Time: 16:33                                                   rt  

                                                                                             

16:28 Order name: CBC Smear Scan; Complete Time: 16:33                                        EDMS

                                                                                             

13:59 Order name: XRAY Chest (1 view); Complete Time: 15:01                                   rt  

                                                                                             

13:59 Order name: CT Abd/Pelvis - IV Contrast Only; Complete Time: 15:01                      rt  

                                                                                             

13:59 Order name: EKG; Complete Time: 13:59                                                   rt  

                                                                                             

16:15 Order name: EKG; Complete Time: 16:16                                                   rt  

                                                                                             

13:59 Order name: Cardiac monitoring; Complete Time: 15:37                                    rt  

                                                                                             

13:59 Order name: EKG - Nurse/Tech; Complete Time: 15:37                                      rt  

                                                                                             

13:59 Order name: IV Saline Lock; Complete Time: 15:37                                        rt  

                                                                                             

13:59 Order name: Labs collected and sent; Complete Time: 15:37                               rt  

                                                                                             

13:59 Order name: O2 Per Protocol; Complete Time: 15:37                                       rt  

                                                                                             

13:59 Order name: O2 Sat Monitoring; Complete Time: 15:37                                     rt  

                                                                                             

16:15 Order name: EKG - Nurse/Tech; Complete Time: 16:24                                      rt  

                                                                                                  

ECG:                                                                                              

15:15 Rate is 103 beats/min. Rhythm is regular, Sinus tachycardia with No ectopy, LVH. QRS    rt  

      Axis is Normal. IA interval is normal. QRS interval is normal. QT interval is normal.       

      No Q waves. Clinical impression: NSR w/ Non-specific ST/T Changes.                          

17:33 Rate is 156 beats/min. Rhythm is irregularly irregular, A fib with No ectopy, Rate      rt  

      related ST and T wave change. QRS Axis is Normal. QRS interval is normal. QT interval       

      is normal. No Q waves.                                                                      

                                                                                                  

Administered Medications:                                                                         

15:52 Drug: Ondansetron IVP 4 mg Route: IVP; Site: right antecubital;                         iw  

18:27 Follow up: Response: No adverse reaction; Nausea is decreased                           iw  

17:01 Drug: NS 0.9%  ml Route: IV; Rate: bolus; Site: right antecubital;                aa5 

18:27 Follow up: IV Status: Completed infusion                                                iw  

                                                                                                  

                                                                                                  

Disposition Summary:                                                                              

23 17:31                                                                                    

Discharge Ordered                                                                                 

      Location: Home                                                                          rt  

      Problem: new                                                                            rt  

      Symptoms: have improved                                                                 rt  

      Condition: Stable                                                                       rt  

      Diagnosis                                                                                   

        - Gastroenteritis                                                                     rt  

        - Atrial fibrillation with rapid ventricular rate                                     rt  

      Followup:                                                                               rt  

        - With: Private Physician                                                                  

        - When: 2 - 3 days                                                                         

        - Reason:                                                                                  

      Followup:                                                                               rt  

        - With: Emergency Department                                                               

        - When: As needed                                                                          

        - Reason: Worsening of condition                                                           

      Discharge Instructions:                                                                     

        - Discharge Summary Sheet                                                             rt  

        - Atrial Fibrillation                                                                 rt  

        - Viral Gastroenteritis, Adult                                                        rt  

        - Left Atrial Appendage Closure Device Implantation, Care After                       rt  

      Forms:                                                                                      

        - Medication Reconciliation Form                                                      rt  

        - Thank You Letter                                                                    rt  

        - Antibiotic Education                                                                rt  

        - Prescription Opioid Use                                                             rt  

        - Patient Portal Instructions                                                         rt  

        - Leadership Thank You Letter                                                         rt  

      Prescriptions:                                                                              

        - Augmentin 875-125 mg Oral Tablet                                                         

            - take 1 tablet by ORAL route every 12 hours for 10 days; 20 tablet; Refills: 0,  rt  

      Product Selection Permitted                                                                 

        - promethazine 25 mg Oral Tablet                                                           

            - take 1 tablet by ORAL route every 6 hours As needed; 15 tablet; Refills: 0,     rt  

      Product Selection Permitted                                                                 

Signatures:                                                                                       

Dispatcher [employer]                           Yashira Gilliland RN RN iw Calderon, Audri, RN                     RN   aa5                                                  

Devendra Dempsey MD MD   rt                                                   

                                                                                                  

**************************************************************************************************

## 2023-08-11 NOTE — RAD REPORT
EXAM DESCRIPTION:  RAD - Chest Single View - 8/11/2023 2:45 pm

 

CLINICAL HISTORY:  CHEST PAIN

Chest pain.

 

COMPARISON:  <Comparisons>

 

FINDINGS:  Portable technique limits examination quality.

 

The lungs are grossly clear. The heart is normal in size. No displaced fractures.Advanced dextroscoli
osis of the thoracic spine.

 

IMPRESSION:  No acute intrathoracic process suspected.

## 2023-08-11 NOTE — XMS REPORT
Continuity of Care Document

                           Created on:2023



Patient:ROBBY MCKNIGHT

Sex:Female

:1940

External Reference #:[id number]





Demographics







                          Address                   [address]



                                                    PO 



                                                    [place] [address]

 

                          Home Phone                [phone]

 

                          Work Phone                [phone]

 

                          Mobile Phone              [phone]

 

                          Email Address             [email or web address]

 

                          Preferred Language        English

 

                          Marital Status            Unknown

 

                          Scientologist Affiliation     Unknown

 

                          Race                      Unknown

 

                          Additional Race(s)        Unavailable

 

                          Ethnic Group              Unknown









Author







                          Organization              [facility]

t

 

                          Address                   [address] [place] [address] [address] [place] [id number]

 

                          Phone                     [phone]









Support







                Name            Relationship    Address         Phone

 

                CASSANDRA MCKNIGHT   GC              [address]      [phone]



                                                [place] [address] 

 

                RADHA MCKNIGHT   CH              Unavailable     [phone]

 

                GEORGE DE LUNA   SP              [address]       [phone]



                                                [place] [address] 

 

                GEORGE MCKNIGHT  SP              [address]      [phone]



                                                [place] [address] 

 

                IRIS MCKNIGHT                 [address]      [phone]



                                                [place] [address] 

 

                JUANITA MCKNIGHT   Unavailable     [address]      [phone]



                                                [place] [address] 

 

                JUAN LUIS CASSANDRA   Unavailable     [address]      [phone]



                                                [place] [address] 

 

                ENRIQUE MCKNIGHT               Unavailable     [phone]









Care Team Providers







                    Name                Role                Phone

 

                    DEON DOMINGUEZ Primary Care Physician Unavailable

 

                    Tapan Herndon      Attending Clinician Unavailable

 

                    Maurice Balderas      Attending Clinician [phone]

 

                    MAURICE BALDERAS      Attending Clinician Unavailable

 

                    Drew Monroy Attending Clinician [phone]

 

                    DREW MONROY Attending Clinician Unavailable

 

                    Rachel Cifuenets Attending Clinician [phone]

 

                    RACHEL CIFUENTES Attending Clinician Unavailable

 

                    Doctor Unassigned, No Name Attending Clinician Unavailable

 

                    Lisandro Will RN Attending Clinician Unavailable

 

                    Jun Dominique DO   Attending Clinician [phone]

 

                    MCKENNA CRUZ Attending Clinician Annie

magdi Sprague MD, Sofia Henry Attending Clinician [phone]

[phone]

 

                    Maia SERRA, Payton Lamas Attending Clinician [phone]

[phone]

 

                    Anthony BLANDON, Mckenna Regalado Attending Clinician 

[phone]

 

                    Pob, [facility]   Attending Clinician Unavailable

 

                    Radha Kirkland MD Attending Clinician [phone]

 

                    RADHA KIRKLAND   Attending Clinician Unavailable

 

                    Yousuf Miller MD Attending Clinician [phone]

 

                    YOUSUF MILLER   Attending Clinician Unavailable

 

                    Dilan Shaver        Attending Clinician [phone]

 

                    MAURICE SWAN     Attending Clinician Unavailable

 

                    Erika Howard Attending Clinician [phone]

 

                    Jose Murillo   Attending Clinician [phone]

 

                    Crystal Pham Attending Clinician [phone]

[phone]

 

                    Deon Dominguez Attending Clinician [phone]

 

                    Daiana Wilson  Attending Clinician [phone]

 

                    Kim Aguilar Attending Clinician [phone]

 

                    Jinny Mosher   Attending Clinician [phone]

 

                    CYanira              Attending Clinician Unavailable

 

                    Denny Randle Attending Clinician [phone]

 

                    Tapan Herndon      Admitting Clinician Unavailable

 

                    Travis Schmid     Admitting Clinician Unavailable

 

                    Maurice Balderas      Admitting Clinician [phone]

 

                    MAURICE BALDERAS      Admitting Clinician Unavailable

 

                    Drew Monroy Admitting Clinician [phone]

 

                    DREW MONROY Admitting Clinician Unavailable

 

                    PAYTON OSBORNE Admitting Clinician Unavailable

 

                    Maia SERRA, Payton Lamas Admitting Clinician [phone]

8

 

                    Aida              Admitting Clinician Unavailable









Payers







           Payer Name Policy Type Policy Number Effective Date Expiration Date MAGGY chung

 

           [place]IC[place]:            [id number] 2003            



           [employer]                       00:00:00              

 

           [employer]            [id number]                         



           [employer]                                             



           - PLAN G (MEDICARE                                             



           SUPPLEMENT)                                             







Problems







       Condition Condition Condition Status Onset  Resolution Last   Treating Co

mments 

Source



       Name   Details Category        Date   Date   Treatment Clinician        



                                                 Date                 

 

       R91.1=SHARON  R91.1=SOL Diagnosis Active 2023          

     [facility]



       TARY   ITARY                1          14:41:00               l



       PULMONARY PULMONARY               00:00:                             Roshan

randy



       NODULE NODULE               00                                 



              Active                                                  



              2023                                                  



               [facility]                                                  

 

       R06.02   R06.02 Diagnosis Active 2022               M

[facility]



              Active                         15:09:00               l



              2022               00:00:                             Edgar auguste



                                 00                                 



              [facility]                                                  

 

       BILATERAL  BILATERAL Diagnosis Active 2022-1        2022-10-31           

    [facility]



       PNEUMONIA PNEUMONIA               0-14          21:44:00               l



              Active               00:00:                             Abdi



              10/14/2022               00                                 



              [facility]                                                  

 

       PNUEMONIA  PNUEMONIA Diagnosis Active 2022-1        2022-10-14           

    [facility]



              Active               014          11:17:00               l



              10/14/2022               00:00:                             Edgar auguste



                                 00                                 



              [facility]                                                  

 

       ANKLE   ANKLE Diagnosis Active 2023               [facility]

cyndiea



       SWELLING SWELLING               0-08          09:08:00               l



              Active               00:00:                             Abdi



              10/08/2022               [address] [place] [address]                                                  



              Abdi                                                  

 

       Atrial Atrial Disease Active                              [facility]



       fibrillati fibrillati               6-11                               it

y of



       on,    on,                  00:00:                             [place]



       unspecifie unspecifie               00                                 Me

dical



       d type d type                                                  Branch

 

       Chest  Chest  Disease Active                              [facility]



       pain,  pain,                6-10                               ity of



       unspecifie unspecifie               00:00:                             [place]

[place]



       d type d type               00                                 Medical



                                                                      Branch

 

       SOB     SOB   Diagnosis Active 2020               [facility]

[facility]



              Active               3-02          12:37:00               l



              2020               00:00:                             Edgar auguste



                                 00                                 



              [facility]                                                  

 

       DX:     DX:   Diagnosis Active 2020               [facility]

oria



       R06.02=INOCENCIA R06.02=INOCENCIA               2          07:57:00               

l



       RTNESS OF RTNESS OF               00:00:                             Ros[facility]

randy



       BREATH BREATH               00                                 



              Active                                                  



              2020                                                  



              [facility]                                                  

 

       CHEST PAIN  CHEST Diagnosis Active 2019-1        2019-10-03              

 [facility]



       /SOB   PAIN /SOB               0-03          19:43:00               l



              Active               00:00:                             Abdi



              10/03/2019               [address] [place] [address]                                                  



              Abdi                                                  

 

       J45.909 -  J45.909 - Diagnosis Active 2018           

    [facility]



       UNSPECIFIE UNSPECIFIE                         16:16:00               

l



       D ASTHMA, D ASTHMA,               00:01:                             Roshan padilla



       UNCOMPLICA UNCOMPLICA               00                                 



              Active                                                  



              2018                                                  



               [facility]                                                  

 

       SHORTNESS        Diagnosis Active 2018               

[facility]



       OF BREATH, SHORTNESS                         09:16:00               l



       CHEST PAIN OF BREATH,               00:00:                             He

rmann



              CHEST PAIN               00                                 



              Active                                                  



              2018                                                  



               [facility]                                                  

 

       DIFFICULTY  DIFFICULT Diagnosis Active 2018          

     [facility]



       BREATHING Y                              16:06:00               l



              BREATHING               00:00:                             [place]



              Active               00                                 



              2018                                                  



              [facility]                                                  

 

       DR. MITCHELL -  DR. MITCHELL Diagnosis Active 2018           

    [facility]



       CHEST PAIN - CHEST               3-21          18:15:00               l



              PAIN                 00:00:                             [place]



              Active               00                                 



              2018                                                  



               [facility]                                                  

 

       UNK     UNK   Diagnosis Active 2016               [facility]

oria



              Active                         09:03:00               l



              2016               00:00:                             Edgar auguste



               [address]                                                  

 

       I71.2   I71.2 Diagnosis Active 2015               [facility]

oria



       THORACIC THORACIC                         08:13:00               l



       AORTIC AORTIC               00:00:                             Abdi



       ANEURYSM, ANEURYSM,               00                                 



       WITHOUT WITHOUT                                                  



              Active                                                  



              2015                                                  



              [facility]                                                  

 

       275.2 -  275.2 - Diagnosis Active 2015               

[facility]



       DIS    DIS                            15:51:00               l



       MAGNESIUM MAGNESIUM               00:01:                             Roshan SUTHERLAND  Active               00                                 



              2015                                                  



              [facility]                                                  

 

       441.2   441.2 Diagnosis Active 2015               Mem

oria



       ANEURYSM ANEURYSM                         15:26:00               l



       OF     OF                   00:00:                             [place]



       ASCENDING ASCENDING               00                                 



       AORTA / AORTA /                                                  



       275    275 Active                                                  



                                                                 



              5 [facility]                                                  

 

       Cervicalgi  Cervicalg Problem                      2019            

   [facility]



       a      ia                                 11:19:44               l



              2019                                                  Edgar auguste



              [facility]                                                  

 

       Pain in  Pain in Problem                      2019               Me

moria



       right  right                              11:19:44               l



       ankle  ankle                                                   [place]



              2019                                                  



              [facility]                                                  

 

       Spondylosi  Spondylos Problem                      2019            

   [facility]



       s without is without                             11:19:44               l



       myelopathy myelopathy                                                  He

rmann



       or     or                                                      



       radiculopa radiculopa                                                  



       thy,   thy,                                                    



       cervical cervical                                                  



       region region                                                  



              2019                                                  



              [facility]                                                  

 

       Unspecifie  Unspecifi Problem                      2018            

   [facility]



       d asthma, ed asthma,                             15:38:17               l



       uncomplica uncomplica                                                  He

dane lucero                                                     



              2018                                                  



              [facility]                                                  

 

       Personal  Personal Problem                      2018               

[facility]



       history of history of                             15:38:17               

l



       transient transient                                                  [facility]

randy



       ischemic ischemic                                                  



       attack attack                                                  



       (TIA), and (TIA), and                                                  



       cerebral cerebral                                                  



       infarction infarction                                                  



       without without                                                  



       residual residual                                                  



       deficits deficits                                                  



              2018                                                  



              [facility]                                                  

 

       Personal  Personal Problem                      2018               

[facility]



       history of history of                             15:38:17               

l



       nicotine nicotine                                                  Edgar

n



       dependence dependence                                                  



                                                                 



              8 [facility]                                                  

 

       Pneumonia        Diagnosis                      2022-10-22               

[facility]



       (disorder) Pneumonia                             23:35:54               l



              (disorder)                                                  Edgar

n



              Diagnosis                                                  



              10/22/2022                                                  



               [facility]                                                  

 

       Asthma  Asthma Problem Resolve               2022-10-10               Mem

oria



       (disorder) (disorder)        d                    22:57:50               

l



              Resolved                                                  Abdi



              Problem                                                  



              10/10/2022                                                  



              [facility],                                                  



              [facility]                                                  

 

       Brain stem  Brain Problem Resolve               2022-10-10               

[facility]



       infarction stem          d                    22:57:50               l



       (disorder) infarction                                                  He

rmann



              (disorder)                                                  



              Resolved                                                  



              Problem                                                  



              10/10/2022                                                  



              [facility],                                                  



              [facility]                                                  

 

       Malignant  Malignant Problem Resolve               2022-10-10            

   [facility]



       neoplasm neoplasm        d                    22:57:50               l



       of skin of skin                                                  [place]



       (disorder) (disorder)                                                  



              Resolved                                                  



              Problem                                                  



              10/10/2022                                                  



              [facility],                                                  



              [facility]                                                  

 

       Depression  Depressio Problem Resolve               2022-10-10           

    [facility]



       - motion n - motion        d                    22:57:50               l



       (qualifier (qualifier                                                  He

rmann



       value) value)                                                  



              Resolved                                                  



              Problem                                                  



              10/10/2022                                                  



              [facility],                                                  



              [facility]                                                  

 

       Dyspnea  Dyspnea Problem Resolve               2022-10-10               M

[facility]



       (finding) (finding)        d                    22:57:50               l



              Resolved                                                  Abdi



              Problem                                                  



              10/10/2022                                                  



              [facility],                                                  



              [facility]                                                  

 

       SHORTNESS  SHORTNESS Diagnosis Active               2022           

    [facility]



       OF BREATH OF BREATH                             15:09:00               l



              Active                                                    Edgar auguste



              [facility]                                                  

 

       INCISIONAL  INCISIONA Diagnosis Active               2023          

     [facility]



       HERNIA L HERNIA                             14:41:00               l



       WITHOUT WITHOUT                                                  Abdi



       OBSTRUCTIO OBSTRUCTIO                                                  



       N OR   N OR                                                    



              Active  [facility]                                                  

 

       PNEUMONIA,  PNEUMONIA Diagnosis Active               2022-10-31          

     [facility]



       UNSPECIFIE ,                                  21:44:00               l



       D ORGANISM UNSPECIFIE                                                  He

dane ALARCON ORGANISM                                                  



              Active [facility]                                                  

 

       SINGLE  SINGLE Diagnosis Active               2022-10-14               Me

moria



       LIVEBORN LIVEBORN                             17:40:00               l



       INFANT, INFANT,                                                  Abdi



       DELIVERED DELIVERED                                                  



       BY ALEX BY ALEX                                                  



              Active [facility]                                                  

 

       THORACIC  THORACIC Diagnosis Active               2015             

  [facility]



       AORTIC AORTIC                             08:13:00               l



       ANEURYSM, ANEURYSM,                                                  H[facility]

randy



       WITHOUT WITHOUT                                                  



       RUPTUR RUPTUR                                                  



              Active  [facility]                                                  

 

       ABNORMAL  ABNORMAL Diagnosis Active               2016             

  [facility]



       RESULT OF RESULT OF                             09:03:00               l



       CARDIOVASC CARDIOVASC                                                  He

dane CASTRO                                                    



       FUNCTI FUNCTI                                                  



              Active  [facility]                                                  

 

       I71.9 /  I71.9 / Diagnosis Active               2016               

[facility]



       R53.83 R53.83                             11:37:00               l



              Active [facility]



              [place]                                                  

 

       CHEST   CHEST Diagnosis Active               2018               Mem

oria



       PAIN,  PAIN,                              09:16:00               l



       UNSPECIFIE UNSPECIFIE                                                  He

dane



       D      D Active                                                  



              [facility]                                                  







History of Past Illness







       Condition Condition Condition Status Onset  Resolution Last   Treating Co

mments 

Source



       Name   Details Category        Date   Date   Treatment Clinician        



                                                 Date                 

 

       Other   Other Problem        2022-1 2022-10-10 2022-10-10               M

[facility]



       specified specified               0-08   22:57:50 22:57:50               

l



       soft   soft                 22:04:                             [place]



       tissue tissue               00                                 



       disorders disorders                                                  



              10/08/2022                                                  



              10/10/2022                                                  



               [facility]                                                  

 

       Weakness   Weakness Problem        -2020          

     [facility]



              2020               3-   23:09:35 23:09:35               l



              2020               18:00:                             Edgar auguste



              [address]                                                  

 

       Chest   Chest Problem        -1 2019-10-05 2019-10-05               M

[facility]



       pain,  pain,                0-03   22:17:22 22:17:22               l



       unspecifie unspecifie               17:00:                             Cal alarcon      d                    00                                 



              10/03/2019                                                  



              10/05/2019                                                  



              [facility]                                                  

 

       Sprain of   Sprain Problem        2019-1 2019-10-05 2019-10-05           

    [facility]



       unspecifie of                   0-03   22:17:22 22:17:22               l



       d ligament unspecifie               17:00:                             He

dane



       of     d ligament               00                                 



       unspecifie of                                                      



       d ankle, unspecifie                                                  



       initial d ankle,                                                  



       encounter initial                                                  



              encounter                                                  



              10/03/2019                                                  



              10/05/2019                                                  



                                                                    



              Michael                                                  

 

       Pain in  Pain in Problem        -2019             

  [facility]



       right  right                   11:19:44 11:19:44               l



       shoulder shoulder               05:30:                             Edgar

n



              2019               26                                 



              2019                                                  



                [facility] [place]                                                  

 

       Diarrhea,  Diarrhea, Problem        -2018         

      [facility]



       unspecifie unspecifie               3-22   15:38:17 15:38:17             

  l



       d      d                    05:00:                             Abdi



              2018               00                                 



              2018                                                  



               [facility]                                                  







Allergies, Adverse Reactions, Alerts







       Allergy Allergy Status Severity Reaction(s) Onset  Inactive Treating Comm

ents 

Source



       Name   Type                        Date   Date   Clinician        

 

       LEVOFLOX DRUG   Active        Other-Cmnt                       Univ

ers



       ACIN   INGREDI                                              ity of



                                          00:00:                      [place]



                                          00                          Medical



                                                                      Branch

 

       Levoflox Propensi Active        Other - See                       U

nivers



       acin   ty to                comments                         ity of



              adverse                      00:00:                      [place]



              reaction                      00                          Medical



              s                                                       Branch

 

       levoflox DA     Active SV     leg swelling                       HC

A



       acin                               4-27                        [place]



                                          00:00:                      [place]



                                          00                          [facility]

 

       levoflox DA     Active SV                                  [facility]



       acin                               9                        [place]



                                          00:00:                      [facility]



                                          00                          dic



                                                                      [facility]ospita



                                                                      l

 

       levoflox DA     Active SV     MUSCLE                       [facility]



       acin                        TWITCHING 917                        [place]



                                          00:00:                      [place]



                                          00                          [facility]

 

       Sulfa  DA     Active MI                                  [facility]



       (Sulfona                             3-19                        [place]



       mide                               00:00:                      [facility]



       Antibiot                             00                          dic



       ics)                                                           Hospita



                                                                      l

 

       iodine DA     Active MI                                  [facility]



                                          3-19                        [place]



                                          00:00:                      [facility]



                                          00                          dic



                                                                      Hospita



                                                                      l

 

       Sulfa  DA     Active MI     NAUSEA                       [facility]



       (Sulfona                             3-19                        [place]



       mide                               00:00:                      [place]



       Antibiot                             00                          [facility])                                                           [facility]

 

       iodine DA     Active MI     tremors                       [facility]



                                          3-19                        [place]



                                          00:00:                      [place]



                                          00                          [facility]

 

       NO KNOWN Drug   Active                                           Univers



       ALLERGIE Class                                                   ity of



       S                                                              [facility]

 

       Augmenti Augmenti Active                                           [facility]

a



       n      n                                                       l



                                                                      [facility]

 

       Dust   Dust   Active                                           [facility]



                                                                      l



                                                                      [facility]

 

       iodine iodine Active                                           [facility]



                                                                      l



                                                                      [place]

 

       Levaquin Levaquin Active                                           [facility]

a



                                                                      l



                                                                      [facility]

 

       sulfa  sulfa  Active                                           [facility]



       drugs  drugs                                                   l



                                                                      [facility]

 

       albutero albutero Active                                           [facility]

a



       l      l                                                       l



                                                                      [place]







Social History







           Social Habit Start Date Stop Date  Quantity   Comments   Source

 

           Exposure to 2022 2022-06-10 Unable to assess            Univers

ity of



           SARS-CoV-2 00:00:00   02:23:00                         [facility]



           (event)                                                [place]

 

           Tobacco use and 2020 Smokeless tobacco            Un

iversity of



           exposure   00:00:00   00:00:00   non-user              [facility]

 

           Sex Assigned At 1940                       Universit

y of



           Birth      00:00:00   00:00:00                         [facility]









                Smoking Status  Start Date      Stop Date       Source

 

                Unknown if ever smoked                                 [facility]

 

                Tobacco smoking status 2022-10-08 22:31:22 2022-10-08 22:31:22 M

[facility]di

 

                Never smoked tobacco                                 [facility]







Medications







       Ordered Filled Start  Stop   Current Ordering Indication Dosage Frequency

 Signature

                    Comments            Components          Source



     Medication Medication Date Date Medication? Clinician                (SIG) 

          



     Name Name                                                   

 

     predniSONE      2022      Yes                      See            [facility]



     10 mg oral      0-20                               Special           l



     tablet      17:46:                               Instructio           Marleny

nn



               00                                 ns, PO,           



                                                  Daily, 4           



                                                  day            



                                                  regimen:           



                                                  Day 1 - 40           



                                                  mg (4           



                                                  tabs) ,           



                                                  Day 2 - 30           



                                                  mg (3           



                                                  tabs) ,           



                                                  Day 3 - 20           



                                                  mg (2           



                                                  tabs) ,           



                                                  Day 4 - 10           



                                                  mg (1           



                                                  tab), # 10           



                                                  tab, 0           



                                                  Refill(s),           



                                                  Pharmacy:           



                                                  [facility]           



                                                  PHARMACY           



                                                  #[id number],           



                                                  152.4, cm,           



                                                  10/15/22           



                                                  18:10:00           



                                                  CDT,           



                                                  Heigh...           

 

     tramadol 50      2022      Yes                      50 mg = 1           M

[facility]



     mg oral      0-20                               tab, PO,           l



     tablet      17:46:                               Q6H, PRN           Abdi



               00                                 Pain, X 5           



                                                  day, # 15           



                                                  tab, 0           



                                                  Refill(s),           



                                                  Pharmacy:           



                                                  [facility]           



                                                  PHARMACY           



                                                  #[id number],           



                                                  152.4, cm,           



                                                  10/15/22           



                                                  18:10:00           



                                                  CDT,           



                                                  Height,           



                                                  44.093,           



                                                  kg,            



                                                  10/15/22           



                                                  18:10:00           



                                                  CDT,           



                                                  Weight           

 

     Xopenex HFA      2022      Yes                      45             [facility]

a



     45 mcg/inh      0-20                               microgram           l



     inhalation      17:45:                               = 1 puff,           He

rmann



     aerosol      00                                 INHALATION           



     with                                         , Q6H, PRN           



     adapter                                         Cough,           



                                                  wheezing,           



                                                  shortness           



                                                  of breath,           



                                                  # 1 ea, 0           



                                                  Refill(s),           



                                                  Pharmacy:           



                                                  [facility]           



                                                  PHARMACY           



                                                  #[id number],           



                                                  152.4, cm,           



                                                  10/15/22           



                                                  18:10:00           



                                                  CDT,           



                                                  Height,           



                                                  44.093,           



                                                  kg,            



                                                  10/15/22           



                                                  18:10:00           



                                                  CDT,           



                                                  Weight           

 

     Vitamin D3      2022      Yes                      25             [facility]



               0-15                               microgram,           l



               01:20:                               PO, Daily,           [place]



               00                                 0              



                                                  Refill(s)           

 

     CoQ10      2022      Yes                      400 mg,           [facility]



               0-15                               PO, Daily,           l



               01:19:                               0              Abdi



               00                                 Refill(s)           

 

     buPROPion      2022      Yes                      150 mg = 1           Me

moria



     150 mg/24      0-15                               tab, PO,           l



     hours (XL)      01:12:                               Q24H, 0           Herm

randy



     oral      00                                 Refill(s)           



     tablet,                                                        



     extended                                                        



     release                                                        

 

     amoxicillin      2022      Yes                      1 tab, PO,           

[facility]



     -clavulanat      0-15                               Daily, #           l



     e 875      01:08:                               20 tab, 0           Abdi



     mg-125 mg      00                                 Refill(s)           



     oral tablet                                                        

 

     azithromyci      2022      No                       Notes:           [place]

ritesh



     n         0-08                               Take 1           l



               22:06:                               hour           [place]



               00                                 before or           



                                                  2 hours           



                                                  after           



                                                  meals.           



                                                  (Same As:           



                                                  Zithromax)           

 

     azithromyci      2022      Yes                      See            [facility]

a



     n 250 mg      0-08                               Instructio           l



     oral tablet      22:04:                               ns, Take 2           

Abdi



               00                                 tablets by           



                                                  mouth the           



                                                  first day           



                                                  then 1           



                                                  tablet by           



                                                  mouth           



                                                  daily on           



                                                  days 2-5.,           



                                                  X 5 day, #           



                                                  6 tab, 0           



                                                  Refill(s),           



                                                  Pharmacy:           



                                                  [facility]           



                                                  DRUG STORE           



                                                  #[id number],           



                                                  157.48,           



                                                  cm,            



                                                  10/08/22           



                                                  12:09:00           



                                                  CDT,           



                                                  Height,           



                                                  45.455,           



                                                  kg,            



                                                  10/08/22           



                                                  12:09:00           



                                                  CDT,           



                                                  Weight           

 

     Lasix      2022      No                       Notes:           [facility]



               0-08                               (Same as:           l



               22:03:                               Lasix) ***                                            MEDICATION           



                                                  WASTE ***           



                                                  Product           



                                                  Size: 40           



                                                  mg Product           



                                                  Wasted:           



                                                  ___ mg           

 

     Saline      2022      No                       Notes:           [facility]



     Flush 0.9%      0-08                               Same as:           l



               17:10:                               BD                                              Posiflush           



                                                  Sterile           

 

     clonazePAM      0      Yes            1mg       1 mg,           Univer

s



     (KLONOPIN)      6-11                               Oral, TID,           ity

 of



     tablet 1 mg      19:00:                               First dose           

[place]



               00                                 on [facility]



                                                  22 at           Branch



                                                  1400,           



                                                  Until           



                                                  Discontinu           



                                                  ed,            



                                                  Routine           

 

     apixaban      -      Yes            2.5mg      2.5 mg,           Unive

rs



     (ELIQUIS)      6                               Oral, BID,           ity 

of



     tablet 2.5      18:15:                               First dose           [place]

[place]



     mg        00                                 on [facility]



                                                  22 at           Branch



                                                  1315,           



                                                  Until           



                                                  Discontinu           



                                                  ed,            



                                                  Routine<br           



                                                  >Indicatio           



                                                  ns:            



                                                  Non-Valvul           



                                                  ar Atrial           



                                                  Fibrillati           



                                                  on             

 

     clonazePAM      -0      Yes            1mg       Take 1 mg           Un

[place]



     1 mg tablet      6-11                               by mouth 3           it

y of



               17:17:                               (three)           [place]



               06                                 times           Medical



                                                  daily.           Branch

 

     clonazePAM      -0      Yes            1mg       Take 1 mg           Un

[facility]



     1 mg tablet      6-11                               by mouth 3           it

y of



               17:17:                               (three)           [place]



               06                                 times           Medical



                                                  daily.           Branch

 

     clonazePAM      -0      Yes            1mg       Take 1 mg           Un

[place]



     1 mg tablet      6-11                               by mouth 3           it

y of



               17:17:                               (three)           [place]



               06                                 times           Medical



                                                  daily.           Branch

 

     clonazePAM      -0      Yes            1mg       Take 1 mg           Un

[place]



     1 mg tablet      6-11                               by mouth 3           it

y of



               17:17:                               (three)           [place]



               06                                 times           Medical



                                                  daily.           Branch

 

     clonazePAM      -0      Yes            1mg       Take 1 mg           Un

[place]



     1 mg tablet      6-11                               by mouth 3           it

y of



               17:17:                               (three)           [place]



               06                                 times           Medical



                                                  daily.           Branch

 

     magnesium      -0 - No             2g        2 g, IV           Univ

ers



     sulfate in                                Piggyback,           it

y of



     water 2      12:00: 17:45                          Administer           [place]

as



     gram/50 mL      00   :00                           over 60           Medica

l



     (4 %)                                         Minutes,           Branch



     infusion 2                                         ONCE, 1           



     g                                            dose, On           



                                                  Sat            



                                                  22 at           



                                                  0700,           



                                                  Routine           

 

     KCL        No             20meq      20 mEq,           [facility]



     (KLOR-CON                                Oral,           ity of



     M20) tablet      12:00: 16:45                          ONCE, 1           [facility]

[place]



     20 mEq      00   :00                           dose, On           Medical



                                                  Sat            Branch



                                                  22 at           



                                                  0700,           



                                                  Routine           

 

     predniSONE      2022- No             20mg      Take 20 mg           

[facility]



     20 mg                                by mouth           ity of



     tablet      10:49: 00:00                          daily.           [place]



               53   :00                                          Medical



                                                                 Branch

 

     atorvastati            Yes            80mg      80 mg,           Univ

ers



     n (LIPITOR)                                     Oral, QHS,           it

y of



     tablet 80      02:00:                               First dose           [facility]

[place]



     mg        00                                 on Fri           Medical



                                                  6/10/22 at           Branch



                                                  2100,           



                                                  Until           



                                                  Discontinu           



                                                  ed,            



                                                  Routine           

 

     apixaban      2022- No        1358 2.5mg      Take 1           Unive

rs



     2.5 mg                                tablet by           ity of



     tablet      00:00: 04:59                          mouth 2           [place]



               00   :00                           (two)           Medical



                                                  times           [place]



                                                  daily for           



                                                  60 days.           



                                                  Indication           



                                                  s: atrial           



                                                  fibrillati           



                                                  on             

 

     metoprolol      2022- No        [id number] 25mg      Take 1           

[facility]



     succinate                                tablet by           ity 

of



     XL 25 mg 24      00:00: 04:59                          mouth           [place]

s



     hr tablet      00   :00                           daily for           Medic

al



                                                  60 days.           Branch

 

     apixaban      2022- No        1358 2.5mg      Take 1           Unive

rs



     2.5 mg                                tablet by           ity of



     tablet      00:00: 04:59                          mouth 2           [place]



               00   :00                           (two)           Medical



                                                  times           [place]



                                                  daily for           



                                                  60 days.           



                                                  Indication           



                                                  s: atrial           



                                                  fibrillati           



                                                  on             

 

     metoprolol      2022- No        [id number] 25mg      Take 1           

[facility]



     succinate                                tablet by           ity 

of



     XL 25 mg 24      00:00: 04:59                          mouth           [place]

[place]



     hr tablet      00   :00                           daily for           Medic

al



                                                  60 days.           [facility]

 

     apixaban      2022- No        1358 2.5mg      Take 1           Unive

rs



     2.5 mg                                tablet by           ity of



     tablet      00:00: 04:59                          mouth 2           [place]



               00   :00                           (two)           Medical



                                                  times           [place]



                                                  daily for           



                                                  60 days.           



                                                  Indication           



                                                  s: atrial           



                                                  fibrillati           



                                                  on             

 

     metoprolol      2022- No        [id number] 25mg      Take 1           

[facility]



     succinate      6- 08-11                          tablet by           ity 

of



     XL 25 mg 24      00:00: 04:59                          mouth           [place]

s



     hr tablet      00   :00                           daily for           Medic

al



                                                  60 days.           [facility]

 

     apixaban      2022- No        1358 2.5mg      Take 1           Unive

rs



     2.5 mg      6- 08-11                          tablet by           ity of



     tablet      00:00: 04:59                          mouth 2           [place]



               00   :00                           (two)           Medical



                                                  times           [place]



                                                  daily for           



                                                  60 days.           



                                                  Indication           



                                                  s: atrial           



                                                  fibrillati           



                                                  on             

 

     metoprolol      2022- No        [id number] 25mg      Take 1           

[facility]



     succinate      6- 08-11                          tablet by           ity 

of



     XL 25 mg 24      00:00: 04:59                          mouth           [place]

s



     hr tablet      00   :00                           daily for           Medic

al



                                                  60 days.           [facility]

 

     apixaban      2022- No        1358 2.5mg      Take 1           Unive

rs



     2.5 mg      6--11                          tablet by           ity of



     tablet      00:00: 04:59                          mouth 2           [place]



               00   :00                           (two)           Medical



                                                  times           [place]



                                                  daily for           



                                                  60 days.           



                                                  Indication           



                                                  s: atrial           



                                                  fibrillati           



                                                  on             

 

     metoprolol      2022- No        [id number] 25mg      Take 1           

[facility]



     succinate      6 08-11                          tablet by           ity 

of



     XL 25 mg 24      00:00: 04:59                          mouth           [place]

s



     hr tablet      00   :00                           daily for           Medic

al



                                                  60 days.           [facility]

 

     predniSONE      2022- No        [id number] 10mg      Take 1           

[facility]



     10 mg      6--13                          tablet by           ity of



     tablet      00:00: 04:59                          mouth           [place]



               00   :00                           daily for           Medical



                                                  1 day.           [facility]anch

 

     HEPARIN      2022- No             60U/kg      2,724           Univer

s



     SODIUM      6-10 06-10                          Units (60           ity of



     (PORCINE)      18:30: 21:20                          Units/kg           [place]

as



     1,000      00   :00                           ?45.4 kg),           Medical



     UNIT/ML                                         IV Push,           [facility]



     BOLUS ACS                                         ONCE, 1           



     ORDER SET                                         dose, On           



                                                  Fri            



                                                  6/10/22 at           



                                                  1330, ASAP           

 

     heparin      2022- No             12U/kg/      12             Univer

s



     25,000      6-10 06-11                h         Units/kg/h           ity of



     Units/250      18:22: 18:06                          r ?45.4 kg           T

[place]



     mL        05   :34                           (5.448           Medical



     (Premixed                                         mL/hr,           [facility]



     Bag) in                                         rounded to           



     0.45 % NS                                         5.45           



                                                  mL/hr), IV           



                                                  Infusion,           



                                                  TITRATE,           



                                                  Parameters           



                                                  in Admin.           



                                                  Instr.,           



                                                  Starting           



                                                  on Fri           



                                                  6/10/22 at           



                                                  1322<br>CA           



                                                  UTION - If           



                                                  LMWH given           



                                                  in ER,           



                                                  AVOID           



                                                  bolus and           



                                                  start next           



                                                  dose/drip           



                                                  12 hrs           



                                                  after ER           



                                                  dosage.&nb           



                                                  sp;&nbsp;M           



                                                  ust            



                                                  program           



                                                  rate using           



                                                  programmab           



                                                  le             



                                                  infusion           



                                                  pump.&nbsp           



                                                  ;&nbsp;[place]           



                                                  ck with           



                                                  the            



                                                  ordering           



                                                  provider           



                                                  first           



                                                  prior to           



                                                  any            



                                                  administra           



                                                  tion           



                                                  should the           



                                                  patient be           



                                                  on             



                                                  existing/a           



                                                  dditional           



                                                  anticoagul           



                                                  ant            



                                                  therapy.           



                                                  &nbsp;Rang           



                                                  e, Dosing           



                                                  and            



                                                  Testing:           



                                                  __________           



                                                  __________           



                                                  __________           



                                                  __________           



                                                  __________           



                                                  __________           



                                                  __________           



                                                  &nbsp;&nbs           



                                                  p;FOR           



                                                  [place],           



                                                  [facility], AND           



                                                  [facility]           



                                                  ONLY           



                                                  &nbsp;&nbs           



                                                  p; - aPTT           



                                                  <              



                                                  35:&nbsp;&           



                                                  nbsp;Bolus           



                                                  5000           



                                                  units,           



                                                  increase           



                                                  rate 300           



                                                  units/hr&n           



                                                  bsp; -           



                                                  aPTT           



                                                  35-44:&nbs           



                                                  p;&nbsp;Bird           



                                                  howard 3000           



                                                  units,           



                                                  increase           



                                                  rate 200           



                                                  units/hr&n           



                                                  bsp; -           



                                                  aPTT           



                                                  45-54:&nbs           



                                                  p;&nbsp;In           



                                                  crease           



                                                  rate 100           



                                                  units/hr&n           



                                                  bsp; -           



                                                  aPTT           



                                                  55-85:&nbs           



                                                  p;&nbsp;NO           



                                                  CHANGE&nbs           



                                                  p; - aPTT           



                                                  86-95:&nbs           



                                                  p;&nbsp;De           



                                                  crease           



                                                  rate 100           



                                                  units/hr&n           



                                                  bsp; -           



                                                  aPTT           



                                                  :&nb           



                                                  sp;&nbsp;H           



                                                  old 30           



                                                  minutes,           



                                                  decrease           



                                                  rate 150           



                                                  units/hr&n           



                                                  bsp; -           



                                                  aPTT >           



                                                  120:&nbsp;           



                                                  &nbsp;Hold           



                                                  60             



                                                  minutes,           



                                                  decrease           



                                                  rate 200           



                                                  units/hr&n           



                                                  bsp;&nbsp;           



                                                  Check aPTT           



                                                  6 hours           



                                                  after           



                                                  initiation           



                                                  , then Q6H           



                                                  after           



                                                  every           



                                                  change,           



                                                  aPTT Q12H           



                                                  once           



                                                  therapeuti           



                                                  c levels           



                                                  are            



                                                  reached.&n           



                                                  bsp;&nbsp;           



                                                  &nbsp;&nbs           



                                                  p;________           



                                                  __________           



                                                  __________           



                                                  __________           



                                                  __________           



                                                  __________           



                                                  __________           



                                                  __&nbsp;&n           



                                                  bsp;FOR           



                                                  [facility]           



                                                  ONLY&nbsp;           



                                                  &nbsp; -           



                                                  aPTT <           



                                                  40:&nbsp;&           



                                                  nbsp;Bolus           



                                                  5000           



                                                  units,           



                                                  increase           



                                                  rate 300           



                                                  units/hr&n           



                                                  bsp; -           



                                                  aPTT           



                                                  40-49:&nbs           



                                                  p;&nbsp;Bird           



                                                  howard 3000           



                                                  units,           



                                                  increase           



                                                  rate 200           



                                                  units/hr&n           



                                                  bsp; -           



                                                  aPTT           



                                                  50-59:&nbs           



                                                  p;&amp;nbs           



                                                  p;Increase           



                                                  rate 100           



                                                  units/hr&n           



                                                  bsp; -           



                                                  aPTT           



                                                  60-85:&nbs           



                                                  p;&nbsp;NO           



                                                  CHANGE&nbs           



                                                  p; - aPTT           



                                                  86-95:&nbs           



                                                  p;&nbsp;De           



                                                  crease           



                                                  rate 100           



                                                  units/hr&n           



                                                  bsp; -           



                                                  aPTT           



                                                  :&nb           



                                                  sp;&nbsp;H           



                                                  old 30           



                                                  minutes,           



                                                  decrease           



                                                  rate 150           



                                                  units/hr&n           



                                                  bsp; -           



                                                  aPTT >           



                                                  120:&nbsp;           



                                                  &nbsp;Hold           



                                                  60             



                                                  minutes,           



                                                  decrease           



                                                  rate 200           



                                                  units/hr&n           



                                                  bsp;&nbsp;           



                                                  Check aPTT           



                                                  6 hours           



                                                  after           



                                                  initiation           



                                                  , then Q6H           



                                                  after           



                                                  every           



                                                  change,           



                                                  aPTT Q12H           



                                                  once           



                                                  therapeuti           



                                                  c levels           



                                                  are            



                                                  reached.&n           



                                                  bsp;&nbsp;           



                                                  &nbsp;DO           



                                                  NOT ADJUST           



                                                  INITIAL           



                                                  BOLUS OR           



                                                  INITIAL           



                                                  INFUSION           



                                                  RATE.<br>           

 

     regadenoson      2022- No        [id number] .4mg      0.4 mg,         

  [facility]



     (LEXISCAN)      6-10 06-10                          Slow IV           ity o

f



     injection      17:15: 17:00                          Push,           [place]



     0.4 mg      00   :00                           ONCE, 1           Medical



                                                  dose, On           [facility]



                                                  Fri            



                                                  6/10/22 at           



                                                  1215,           



                                                  Routine<br           



                                                  >Faculty           



                                                  member           



                                                  approving           



                                                  Restricted           



                                                  medication           



                                                  :              



                                                  BILL MOREL        2022- No        [id number] 14.7mCi      14.7           Un[facility]





     99m-tetrofo      6-10 06-10                          millicurie           i

ty of



     smin      17:00: 18:00                          ,              [place]



     (MYOVIEW)      00   :00                           Intravenou           Medi

natalie



     injection                                         s, ONCE, 1           [facility]

ch



     14.7                                         dose, On           



     millicurie                                         Fri            



                                                  6/10/22 at           



                                                  1200,           



                                                  Routine           

 

     predniSONE            Yes            10mg      10 mg,           Un[facility]

rs



     (DELTASONE)      6-10                               Oral,           ity of



     tablet 10      14:00:                               DAILY,           [place]



     mg        00                                 First dose           Medical



                                                  on Fri           [facility]



                                                  6/10/22 at           



                                                  0900,           



                                                  Until           



                                                  Discontinu           



                                                  ed,            



                                                  Routine           

 

     aspirin            Yes            81mg      81 mg,           [facility]



     chewable      10                               Oral,           ity of



     tablet 81      14:00:                               DAILY,           [place]



     mg        00                                 First dose           Medical



                                                  on Fri           [facility]



                                                  6/10/22 at           



                                                  0900,           



                                                  Until           



                                                  Discontinu           



                                                  ed,            



                                                  Routine           

 

     levalbutero            Yes            .31mg      0.31 mg,           U

nivers



     l (XOPENEX)      6-10                               Inhalation           it

y of



     nebulizer      13:00:                               , TID,           [place]



     solution      00                                 First dose           Medic

al



     0.31 mg                                         on Fri           [facility]



                                                  6/10/22 at           



                                                  0800,           



                                                  Until           



                                                  Discontinu           



                                                  ed,            



                                                  Routine           

 

     heparin      2022- No             5000U      5,000           [facility]



     (porcine)      6-10 06-10                          Units,           ity of



     injection      13:00: 18:23                          Subcutaneo           T

[facility]



     5,000 Units      00   :11                           us, Q12H,           Med

ical



                                                  First dose           [facility]



                                                  on Fri           



                                                  6/10/22 at           



                                                  0800,           



                                                  Until           



                                                  Discontinu           



                                                  ed,            



                                                  Routine           

 

     KCL       2022- No             40meq      40 mEq,           [facility]



     (KLOR-CON      6-10 06-10                          Oral, 5X           ity o

f



     M20) tablet      11:00: 18:59                          DAY, 2           [facility]

as



     40 mEq      00   :00                           doses,           Medical



                                                  First dose           [facility]



                                                  on Fri           



                                                  6/10/22 at           



                                                  0600, Last           



                                                  dose on           



                                                  Fri            



                                                  6/10/22 at           



                                                  1000,           



                                                  Routine           

 

     acetaminoph            Yes            650mg      650 mg,           Un

[facility]



     en        610                               Oral,           ity of



     (TYLENOL)      08:23:                               Q6HPRN,           [place]



     tablet 650      33                                 Starting           Medic

al



     mg                                           on Fri           [facility]



                                                  6/10/22 at           



                                                  0323,           



                                                  Until           



                                                  Discontinu           



                                                  ed,            



                                                  Routine,           



                                                  Pain           



                                                  (scale           



                                                  1-3)           

 

     LORazepam      2022- No             .5mg      0.5 mg,           Univ

ers



     (ATIVAN)      6-10 06-10                          Slow IV           ity of



     injection      06:30: 05:24                          Push,           [place]



     0.5 mg      00   :00                           ONCE, 1           Medical



                                                  dose, On           [facility]



                                                  Fri            



                                                  6/10/22 at           



                                                  0130, STAT           

 

     acetaminoph      2022- No             1000mg      1,000 mg,         

  [facility]



     en        6-10 06-10                          Oral, ONCE           ity of



     (TYLENOL)      03:45: 02:46                          NOW, 1           [place]



     tablet      00   :00                           dose, On           Medical



     1,000 mg                                         u 22           [facility]

h



                                                  at 2245,           



                                                  Routine           

 

     aspirin       No             325mg      325 mg,           [facility]

rs



     tablet 325      6-10 06-10                          Oral,           ity of



     mg        02:00: 01:00                          ONCE, 1           [place]



               00   :00                           dose, On           Medical



                                                  Thu 22           [facility]



                                                  at 2100,           



                                                  STAT           

 

     diltiazem       No             15mg      15 mg, IV           Un

[facility]



     (CARDIZEM      6-10 06-10                          Push,           ity of



     IV)       02:00: 00:53                          ONCE, 1           [place]



     injection      00   :00                           dose, On           Medica

l



     15 mg                                         Thu 22           [facility]



                                                  at 2100,           



                                                  STAT<br>Fa           



                                                  culty           



                                                  member           



                                                  approving           



                                                  Restricted           



                                                  medication           



                                                  :              



                                                  SOFIA MARTINEZ           

 

     Solu-Medrol            No                       Notes:           [facility]

ritesh



               3-                               (Same           l



               19:39:                               as:Solu-ME                                            DROL,           



                                                  A-Methapre           



                                                  d)             

 

     Benadryl            No                       Notes:           [facility]



               3-02                               (Same as:           l



               19:39:                               Benadryl)                                                           

 

     Saline            No                       Notes:           [facility]



     Flush 0.9%      3-                               (Same as:           l



               18:09:                               BD             [facility]                                 Posiflush)           

 

     Acetaminoph      2019      Yes                      1 - 2 tab,           

[facility]



     en 300 MG /      0-03                               PO, Q4H,           l



     Codeine      23:33:                               PRN Pain,           [facility]

nn



     Phosphate      00                                 X 3 day, #           



     30 MG Oral                                         20 tab, 0           



     Tablet                                         Refill(s)           



     [Tylenol                                                        



     with                                                        



     Codeine #3]                                                        

 

     Saline      2019      No                       Notes:           [facility]



     Flush 0.9%      0-03                               (Same as:           l



               21:31:                               BD                                              Posiflush)           

 

     Clonazepam            Yes                      0.5 mg = 1           M

[facility]



     0.5 MG Oral      6-28                               tab, PO,           l



     Tablet      23:53:                               TID, PRN           [facility]



               00                                 Anxiety, 0           



                                                  Refill(s)           

 

     levalbutero            Yes                      0.63 mg =           M

[facility]



     l 0.63 mg/3      -                               3 mL, NEB,           l



     mL        23:53:                               PRN, PRN           [place]



     inhalation      00                                 Respirator           



     solution                                         y Pathway,           



                                                  # 180           



                                                  inhalation           



                                                  , 0            



                                                  Refill(s)           

 

     200 ACTUAT            Yes                      45             [facility]



     Levalbutero      -28                               microgram           l



     l 0.045      23:53:                               = 1 puff,           [facility]

nn



     MG/ACTUAT      00                                 INHALATION           



     Metered                                         , Q6H, PRN           



     Dose                                         Cough,           



     Inhaler                                         wheezing,           



     [Xopenex]                                         shortness           



                                                  of breath,           



                                                  # 1 ea, 0           



                                                  Refill(s)           

 

     clonazePAM            Yes                      0.5 mg = 1           M

[facility]



     100       6-28                               tab, PO,           l



     microgram/m      23:53:                               TID, PRN           [facility]

[facility]



     l cpmd oral      00                                 Anxiety, 0           



     suspension                                         Refill(s)           

 

     NS (Bolus)            No                       500 mL,           [facility]

rit[facility]



     IV                                       500 ml/hr,           l



               18:52:                               Infuse           [facility]                                 Over: 1           



                                                  hr, Route:           



                                                  IV, 500,           



                                                  Drug form:           



                                                  INJ, ONCE,           



                                                  Priority:           



                                                  STAT,           



                                                  Dosing           



                                                  Weight           



                                                  48.182 kg,           



                                                  Start           



                                                  date:           



                                                  18           



                                                  13:52:00           



                                                  CDT, Stop           



                                                  date:           



                                                  18           



                                                  13:52:00           



                                                  CDT            

 

     Prednisone            No                       Notes:           [facility]

ia



               -                               Take with           l



               07:00:                               food.           [place]                                                

 

     Diphenhydra            No                       Notes:           [facility]

ritesh



     mine      -                               (Same as:           l



               23:46:                               Benadryl)           [place]



                                                               

 

     Protonix            No                       Notes:           [facility]



               6-27                               Tablet           l



               18:05:                               should not                                            be chewed           



                                                  or             



                                                  crushed.           



                                                  (Same as:           



                                                  Protonix)           

 

     methylPREDN            No                       Notes:           [facility]

[facility]



     ISolone      -                               (Same           l



     SODium      18:00:                               as:Solu-ME           [facility]

nn



     SUCCinate      00                                 DROL,           



                                                  A-Methapre           



                                                  d)             

 

     Aspirin            No                       Notes:           [facility]



               6-27                               Take with           l



               14:00:                               food.           [facility]



                                                               

 

     multivitami            No                       Notes:           [facility]

ritesh



     n                                        (Same           l



               14:00:                               as:One Tab           [facility]



               00                                 Daily,           



                                                  Tab-A-Simone           



                                                  + Beta           



                                                  Carotene)           



                                                  Give with           



                                                  food.           

 

     Xopenex            No                       Notes: SEE           [facility]

ritesh



                                              RT             l



               13:35:                               DOCUMENTAT           [place]



               00                                 ION (Same           



                                                  as:Xopenex           



                                                  )              



                                                  Non-Formul           



                                                  navdeep            

 

     Klonopin            No                       Notes:           [facility]



                                              (Same As:           l



               13:25:                               KlonoPIN)           [facility]



                                                               

 

     Ipratropium            No                       Notes: SEE           

[facility]



     Bromide 0.2                                     RT             l



     MG/ML      19:19:                               DOCUMENTAT           [place]

n



     Inhalant      00                                 ION (Same           



     Solution                                         as:Atroven           



                                                  t)             

 

     Xopenex            No                       Notes: SEE           [facility]

ritesh



                                              RT             l



               19:18:                               DOCUMENTAT           [place]



               00                                 ION (Same           



                                                  as:Xopenex           



                                                  )              



                                                  Non-Formul           



                                                  navdeep            

 

     Metronidazo            No                       500 mg = 1           

[facility]



     le 500 MG      3-22                               tab, PO,           l



     Oral Tablet      06:01:                               Q8H, X 7           He

rmann



     [Flagyl]      00                                 day, # 21           



                                                  tab, 0           



                                                  Refill(s)           

 

     Flagyl            No                       500 mg,           [facility]



               3-22                               Route: PO,           l



               05:51:                               ONCE,           [place]



               00                                 Dosing           



                                                  Weight           



                                                  48.182,           



                                                  kg,            



                                                  Priority:           



                                                  STAT,           



                                                  Start           



                                                  date:           



                                                  18           



                                                  0:51:00           



                                                  CDT, Stop           



                                                  date:           



                                                  18           



                                                  0:51:00           



                                                  CDT, ABX           



                                                  Indication           



                                                  :              



                                                  Infectious           



                                                  Diarrhea           

 

     Ondansetron            No                       Notes:           [facility]

ritesh



               3-22                               (Same as:           l



               01:19:                               Zofran)           [place]



               00                                 ***            



                                                  MEDICATION           



                                                  WASTE ***           



                                                  Product           



                                                  Size: 4 mg           



                                                  Product           



                                                  Wasted:           



                                                  ___ mg           

 

     Famotidine            No                       Notes:           [facility]

ia



               3-22                               (Same as:           l



               01:19:                               Pepcid)           [place]



               00                                 Can be           



                                                  dilute in           



                                                  5-10cc NS           



                                                  IVP: Slow           



                                                  IV push           



                                                  over at           



                                                  least 2           



                                                  minutes.           

 

     Sodium            No                       1,000 mL,           [facility]

a



     Chloride      3-22                               1000           l



     0.9%      01:19:                               ml/hr,           [facility]



     (Bolus) IV      00                                 Infuse           



                                                  Over: 1           



                                                  hr, Route:           



                                                  IV, 1,000,           



                                                  Drug form:           



                                                  INJ, ONCE,           



                                                  Priority:           



                                                  STAT,           



                                                  Dosing           



                                                  Weight           



                                                  48.182 kg,           



                                                  Start           



                                                  date:           



                                                  18           



                                                  20:19:00           



                                                  CDT, Stop           



                                                  date:           



                                                  18           



                                                  20:19:00           



                                                  CDT            

 

     Saline            No                       Notes:           [facility]



     Flush 0.9%      3-21                               (Same as:           l



               22:41:                               BD                                              Posiflush)           

 

     Albuterol            No                       Notes:           [facility]

a



     0.833 MG/ML      5-05                               (Same as:           l



     /         18:33:                               Duoneb)           



     Ipratropium      00                                                



     Bromide                                                        



     0.167 MG/ML                                                        



     Inhalant                                                        



     Solution                                                        



     [DuoNeb]                                                        

 

     Al              No                       Notes:           [facility]



     hydroxide/M      5-05                               (aluminum           l



     g         15:08:                               hydroxide-           [place]



     hydroxide/s      00                                 magnesium           



     imethicone                                         hyd-simeth           



     200 mg-200                                         icone           



     mg-20 mg/5                                         200-200-20           



     mL oral                                         mg/5ml 30           



     suspension                                         ml ud MARY)           

 

     Ondansetron            No                       Notes:           [facility]

ritesh



               5-05                               (Same as:           l



               15:08:                               Zofran)                                                           

 

     Nitroglycer            No                       Notes:           [facility]

ritesh



     in        -05                               (Same           l



               15:08:                               as:Nitroqu                                            ick,           



                                                  Nitrostat)           



                                                  "Do Not           



                                                  Crush"           



                                                  Sublingual           



                                                  tablet           

 

     normal            No                       1,000 mL,           [facility]

a



     saline 0.9%      5-05                               Rate: 100           l



     IV 1,000 mL      13:40:                               ml/hr,                                            Infuse           



                                                  over: 10           



                                                  hr, Route:           



                                                  IV, Dosing           



                                                  Weight           



                                                  52.273 kg,           



                                                  Total           



                                                  Volume:           



                                                  1,000,           



                                                  Start           



                                                  date:           



                                                  16           



                                                  8:40:00           



                                                  CDT,           



                                                  Duration:           



                                                  30 day,           



                                                  Stop date:           



                                                  16           



                                                  8:39:00           



                                                  CDT            

 

     aspirin            Yes                      81 mg, PO,           [facility]

ritesh



               5-05                               Daily, 0           l



               13:29:                               Refill(s)                                                           

 

     magnesium            Yes                      800 mg =,           [facility]

[facility]



     citrate      5-05                               PO, TID, 0           l



               13:29:                               Refill(s)                                                           

 

     multivitami            Yes                      otc, PO,           Me

moria



     n         5-05                               Daily, 0           l



               13:29:                               Refill(s)                                                           

 

     Vitamin B12            Yes                      5000 mcg,           [facility]

[facility]



     Methylcobal      5-05                               SL, Daily,           l



     alexander      13:29:                               0                                               Refill(s)           

 

     Aspirin            Yes                      81 mg, PO,           [facility]

ritesh



               5-05                               Daily, 0           l



               13:29:                               Refill(s)           [facility]



               00                                                

 

     magnesium            Yes                      800 mg =,           [facility]

[facility]



     citrate      5-05                               PO, TID, 0           l



               13:29:                               Refill(s)           [place]



               00                                                

 

     200 ACTUAT      -      Yes                      1 puff,           [facility]

ritesh



     Levalbutero      5-05                               INHALATION           l



     l 0.045      13:29:                               , Q6H, PRN           [facility]

randy



     MG/ACTUAT      00                                 Cough,           



     Metered                                         wheezing,           



     Dose                                         shortness           



     Inhaler                                         of breath,           



     [Xopenex]                                         # 15 gm, 2           



                                                  Refill(s)           

 

     clonazePAM            Yes                      1 mg = 1           [facility]

[facility]



     1 mg oral      5-05                               tab, PO,           l



     tablet      13:29:                               TID, # 270           [facility]

nn



               00                                 tab, 0           



                                                  Refill(s)           

 

     Famotidine            No                       Notes:           [facility]

ia



     20 MG Oral      5-05                               (Same as:           l



     Tablet      13:22:                               Pepcid)           [facility]



     [Pepcid]      00                                                







Vital Signs







             Vital Name   Observation Time Observation Value Comments     Source

 

             Systolic blood 2022 13:43:00 162 mm[Hg]                [facility]

 

             Diastolic blood 2022 13:43:00 93 mm[Hg]                 [facility]

 

             Heart rate   2022 13:43:00 89 /min                   [facility]

 

             Body temperature 2022 13:43:00 36.56 Amna                 [facility]

 

             Oxygen saturation in 2022 13:43:00 95 /min                   

[facility]



             Arterial blood by                                        [facility]

natalie



             Pulse oximetry                                        [facility]

 

             Respiratory rate 2022 13:10:00 16 /min                   [facility]

 

             Body height  2022-06-10 07:26:00 154.9 cm                  [facility]

 

             Body weight  2022-06-10 07:26:00 45.36 kg                  [facility]

 

             BMI          2022-06-10 07:26:00 18.89 kg/m2               [facility]

 

             Systolic blood 2020 20:54:00 153 mm[Hg]                [facility]

 

             Diastolic blood 2020 20:54:00 84 mm[Hg]                 [facility]



             [facility]                                            [facility]

 

             Body height  2020 20:54:00 154 cm                    [facility]

 

             Body weight  2020 20:54:00 46.811 kg                 [facility]

 

             BMI          2020 20:54:00 19.74 kg/m2               [facility]

 

             Heart rate   2020 20:51:00 91 /min                   [facility]

 

             Heart Rate   2022-10-20 20:04:00                           [facility]

 [place]

 

             Systolic (mm Hg) 2022-10-20 20:04:00                           [facility]

rial [place]

 

             Diastolic (mm Hg) 2022-10-20 20:04:00                           [facility]

[facility]

 

             Temperature Oral (F) 2022-10-20 15:59:00 99.1 F                    

[facility] [place]

 

             Height       2022-10-15 23:10:00 5 [ft_i]                  [facility]

 [facility]

 

             Weight       2022-10-15 23:10:00                           [facility]

 [place]

 

             BMI Calculated 2022-10-15 23:10:00                           [facility]

al [facility]

 

             Respitory Rate 2022-10-08 21:46:00                           [facility]

al [facility]

 

             Systolic (mm Hg) 2022-10-08 21:46:00                           [facility]

rial [place]

 

             Diastolic (mm Hg) 2022-10-08 21:46:00                           [facility]

[facility]

 

             Temperature Oral (F) 2022-10-08 21:46:00 97.9 F                    

[facility] [place]

 

             Height       2022-10-08 17:09:00 157.48 cm                 [facility]

 [place]

 

             BMI Calculated 2022-10-08 17:09:00                           [facility]

al [facility]

 

             Weight       2022-10-08 17:09:00                           [facility]

 [facility]

 

             Systolic (mm Hg) 2022-10-08 17:09:00                           [facility]

rial [facility]

 

             Diastolic (mm Hg) 2022-10-08 17:09:00                           [facility]

[facility] [place]

 

             Heart Rate   2022-10-08 17:09:00                           [facility]

 [facility]

 

             Respitory Rate 2022-10-08 17:09:00                           [facility]

al [place]

 

             Temperature Oral (F) 2022-10-08 17:09:00 97.8 F                    

[facility] [place]

 

             Respitory Rate 2020 23:51:00                           [facility]

al [place]

 

             Systolic (mm Hg) 2020 23:51:00                           [facility]

rial [place]

 

             Diastolic (mm Hg) 2020 23:51:00                           [facility]

[facility] [place]

 

             Temperature Oral (F) 2020 23:51:00 98.3 F                    

[facility]

 

             Respitory Rate 2020 22:58:00                           [facility]

al [place]

 

             Systolic (mm Hg) 2020 22:58:00                           [facility]

rial [place]

 

             Diastolic (mm Hg) 2020 22:58:00                           [facility]

[facility]

 

             Temperature Oral (F) 2020 22:58:00 98.5 F                    

[facility]

 

             Respitory Rate 2020 21:30:00                           [facility]

al [place]

 

             Systolic (mm Hg) 2020 21:30:00                           [facility]

rial [place]

 

             Diastolic (mm Hg) 2020 21:30:00                           [facility]

[facility]

 

             Heart Rate   2020 17:45:00                           [facility]

 [facility]

 

             Temperature Oral (F) 2020 17:45:00 98.7 F                    

[facility]

 

             Weight       2020 17:45:00                           [facility]

 [place]

 

             Temperature Oral (F) 2019-10-03 23:50:00 98.6 F                    

[facility]

 

             Heart Rate   2019-10-03 23:50:00                           [facility]

 [place]

 

             Respitory Rate 2019-10-03 23:50:00                           [facility]

al [place]

 

             Systolic (mm Hg) 2019-10-03 23:50:00                           [facility]

rial [place]

 

             Diastolic (mm Hg) 2019-10-03 23:50:00                           [facility]

[facility] [place]

 

             Systolic (mm Hg) 2019-10-03 21:30:00                           [facility]

rial [place]

 

             Diastolic (mm Hg) 2019-10-03 21:30:00                           [facility]

[facility] [place]

 

             Heart Rate   2019-10-03 21:30:00                           [facility]

 [facility]

 

             Respitory Rate 2019-10-03 21:30:00                           [facility]

al [place]

 

             Temperature Oral (F) 2019-10-03 21:30:00 98.0 F                    

[facility]

 

             Height       2019-10-03 21:30:00 157.48 cm                 [facility]

 [place]

 

             BMI Calculated 2019-10-03 21:30:00                           [facility]

al [place]

 

             Weight       2019-10-03 21:30:00                           [facility]

 [facility]

 

             Systolic (mm Hg) 2018 01:00:00                           [facility]

rial [place]

 

             Diastolic (mm Hg) 2018 01:00:00                           [facility]

[facility] [place]

 

             Respitory Rate 2018 01:00:00                           [facility]

al [place]

 

             Heart Rate   2018 01:00:00                           [facility]

 [facility]

 

             Temperature Oral (F) 2018 01:00:00 98 F                      

[facility]

 

             Heart Rate   2018 19:24:00                           [facility]

 [facility]

 

             Temperature Oral (F) 2018 19:24:00 98.0 F                    

[facility] [place]

 

             Respitory Rate 2018 19:24:00                           [facility]

al [place]

 

             Systolic (mm Hg) 2018 19:24:00                           [facility]

rial [place]

 

             Diastolic (mm Hg) 2018 19:24:00                           [facility]

[facility]

 

             Heart Rate   2018 16:41:00                           [facility]

 [place]

 

             Temperature Oral (F) 2018 16:41:00 98.1 F                    

[facility]

 

             Systolic (mm Hg) 2018 16:41:00                           [facility]

rial [place]

 

             Diastolic (mm Hg) 2018 16:41:00                           [facility]

[facility]

 

             Respitory Rate 2018 16:41:00                           [facility]

al [place]

 

             Weight       2018 22:21:00                           [facility]

 [facility]

 

             BMI Calculated 2018 22:21:00                           [facility]

al [place]

 

             Height       2018 22:21:00 157.48 cm                 [facility]

 [place]

 

             BMI Calculated 2018 19:02:00                           [facility]

al [place]

 

             Weight       2018 19:02:00                           [facility]

 [facility]

 

             Height       2018 19:02:00 157.48 cm                 [facility]

 [facility]

 

             BMI Calculated 2018 17:59:00                           [facility]

al [place]

 

             Weight       2018 17:59:00                           [facility]

 [facility]

 

             Height       2018 17:59:00 157.48 cm                 [facility]

 [place]

 

             Temperature Oral (F) 2018 06:16:00 98.3 F                    

[facility]

 

             Systolic (mm Hg) 2018 06:16:00                           [facility]

rial [place]

 

             Diastolic (mm Hg) 2018 06:16:00                           Mem

[facility] [place]

 

             Respitory Rate 2018 06:16:00                           Mem[facility]

al [place]

 

             Systolic (mm Hg) 2018 06:05:00                           Sander

rial [place]

 

             Diastolic (mm Hg) 2018 06:05:00                           Mem

orial [place]

 

             Respitory Rate 2018 06:05:00                           [facility]

al [place]

 

             Respitory Rate 2018 05:30:00                           [facility]

[facility] [place]

 

             Systolic (mm Hg) 2018 04:30:00                           Sander

rial [place]

 

             Diastolic (mm Hg) 2018 04:30:00                           Mem

[facility] [place]

 

             Weight       2018 22:40:00                           [facility]

 [place]

 

             Height       2018 22:40:00 157.48 cm                 [facility]

 [place]

 

             BMI Calculated 2018 22:40:00                           [facility]

[facility] [place]

 

             Temperature Oral (F) 2018 22:40:00 98.8 F                    

[facility] [place]

 

             Heart Rate   2018 22:40:00                           [facility]

 [place]

 

             Respitory Rate 2016 13:45:00                           [facility]

[facility] [place]

 

             Systolic (mm Hg) 2016 13:45:00                           Sander

rial [place]

 

             Diastolic (mm Hg) 2016 13:45:00                           Mem

[facility] [place]

 

             BMI Calculated 2016 13:14:00                           Mem[facility]

al [place]

 

             Height       2016 13:14:00 160.02 cm                 [facility]

 [place]

 

             Weight       2016 13:14:00                           [facility]

 [place]







Procedures







                Procedure       Date / Time     Performing Clinician Source



                                Performed                       

 

                [id number]         2023 00:00:00 [facility]

 

                [id number]         2023 00:00:00 [facility]

 

                AUTHORIZATION FOR 2022 05:01:00 Doctor Unassigned, No Univ

[facility]



                RELEASE OF PHI                  Name            Medical Branch

 

                MAGNESIUM       2022 09:33:00 Ada Kamara     [place]

f [facility]

 

                BASIC METABOLIC PANEL 2022 09:33:00 Ada Kamara     [facility]iver

[facility] [place]



                (NA, K, CL, CO2,                                 Medical Branch



                GLUCOSE, BUN,                                   



                CREATININE, CA)                                 

 

                ACTIVATED PARTIAL 2022 05[facility]00[facility]00 [facility]

 

                TROPONIN I      2022-06-10 20:34:00 Sariah [facility]

 

                ACTIVATED PARTIAL 2022-06-10 20:34:00 Sariah [facility]

 

                TRANSTHORACIC ECHO (TTE) 2022-06-10 19:54:00 Gómez Means   [facility]



                COMPLETE                                        [facility]

 

                NM MYOCARDIUM PERFUSION 2022-06-10 18:00:00 Augustin Rolle [facility]



                STRESS ONLY                                     [facility]

 

                MAGNESIUM       2022-06-10 09:13[facility]00 [facility]

 

                TROPONIN I      2022-06-10 09:13:00 Sariah [facility]

 

                BASIC METABOLIC PANEL 2022-06-10 09:13:00 Gómez Means   [facility]



                (NA, K, CL, CO2,                                 Medical Branch



                GLUCOSE, BUN,                                   



                CREATININE, CA)                                 

 

                LIPID PANEL     2022-06-10 09:13:00 Gómez Means   [facility]



                ([id number])(TOTAL                                   [facility] Branch



                CHOLESTEROL,                                    



                TRIGLYCERIDES, HDL)                                 

 

                CBC WITH DIFF   2022-06-10 09:13:00 [facility]

 

                GLYCOSYLATED HEMOGLOBIN 2022-06-10 09:13:00 Gómez Means   [facility]



                (A1C)                                           [facility]

 

                PROTHROMBIN TIME / INR 2022-06-10 09:13:00 Gómez Means   [facility]

 

                ACTIVATED PARTIAL 2022-06-10 09:13:00 Gómez Means   [facility]

 

                XR CHEST 2 VW   2022-06-10 01:45:00 Celestine [facility]

 

                CBC WITH DIFF   2022-06-10 00:52:00 [facility]estine [facility]

 

                TROPONIN I      2022-06-10 00:51:00 Celestine [facility]

 

                COMP. METABOLIC PANEL 2022-06-10 00:51:00 Celestine [facility]



                ([id number])                         [facility]

 

                N-TERMINAL PRO-BNP 2022-06-10 00:51:00 Sofia Sprague

[facility]

 

                HB ECG ROUTINE & RHYTHM 2022-06-10 00:46:59 [facility]

 

                PHYSICIAN ORDERS 2021 06:01:00 Doctor Unassigned, No Unive

[facility]

 

                CBC WITH DIFF   2021 14:42:00 Fay [facility] o

[facility]

 

                PHYSICIAN ORDERS 2021 05:01:00 Doctor Unassigned, No Unive

[facility]

 

                ASSIGNMENT OF BENEFITS 2020 20:31:34 Doctor Unassigned, No

 [facility]

 

                Uterine suspension                                 [facility] Roshan padilla



                without shortening of                                 



                round ligaments or                                 



                sacrouterine ligaments                                 

 

                Lymph node operation                                 [facility]

 

                Polypectomy                                     Edvin Patton







Encounters







        Start   End     Encounter Admission Attending Care    Care    Encounter 

Source



        Date/Time Date/Time Type    Type    Clinicians Facility Department ID   

   

 

        2023 Outpatient                 NALINI    [facility]    [id number]

[id number] [facility]



        15:00:00 15:00:00                                         20      l



                                                                        Abdi

 

        2023 Inpatient ANNABEL Sky   INTE    [id number]

[id number] [facility]



        05:45:00 16:55:00                 Tapan                  19      [facility]

 

        2023 Outpatient MAURO SkyCL   3DAY    [id number]

[id number] [facility]



        08:00:00 09:00:00                 Tapan                  03      [facility]

 

        2023 2023-03-10 Outpatient                 NALINI    [facility] [id number]

[id number] Mem[facility]



        20:32:00 05:59:00                                 Abdi 10      l



                                                        [facility]         

 

        2023 Outpatient         YULIANA Balderas    [facility]    [id number]

[id number] 



        14:32:00 23:59:00                 Maurice                 10      

 

        2023 Outpatient         YULIANA BALDERAS    PUL     [id number]   

 MH



        14:32:00 [facility]:59:00                 [facility]

[facility]



                                                                        [facility]

 

        2022 2022-11-10 Outpatient                 [facility] 3429

[id number] Mem[facility]



        19:45:00 05:59:00                         r       Abdi 13      l



                                                        [facility]         

 

        2022 Outpatient         Balderas,  MH[facility]    [facility]    [id number]

[id number] 



        13:45:00 23:59:00                 Maurice                 13      

 

        2022 Outpatient         BALDERAS,  MHSE    PUL     [id number]   

 MH



        13:45:00 23:59:00                 [facility]

 

        2022-10-14 2022-10-20 Inpatient                 [facility] [id number]

[id number] Memoria



        22:38:00 21:17:00                         r       Abdi 87      l



                                                        [facility]         

 

        2022-10-14 2022-10-20 Outpatient         Drew Monroy [facility]    [facility]    [id number]

[id number] 



        17:38:00 16:17:00                 Thalely                 87      



                                        u                               

 

        2022-10-14 2022-10-20 Inpatient U       DREW MONROY MHSE    MED     [id number]

    MH



        17:38:00 16:17:00                                                 [facility]

 

        2022-10-08 2022-10-08 Emergency                 [facility] 96919

[id number] Memoria



        16:28:08 22:37:00                         r       Abdi 08      l



                                                        [facility]         

 

        2022-10-08 2022-10-08 Outpatient         DEV Cifuentes    [facility]    [id number]

[id number] 



        11:28:08 17:37:00                 Rachel Crawley Ivan                            

 

        2022-10-08 2022-10-08 Emergency E       VANE,  BL    [facility]    7508    

[facility]



        11:28:00 17:37:00                 RACHEL                         

 

        2022 Orders          Doctor  DILAN    1.2.840.114 [id number]

62 Univers



        00:00:00 00:00:00 Only            Unassigned, OMARI   350.1.13.10       

  ity of



                                        [facility] 4.2.7.2.686         [place]

as



                                                        [phone]         Medi

natalie



                                                        009             Branch

 

        2022 Transition         RAJI Will  1.2.840.114 943

[id number] Univers



        00:00:00 00:00:00 of Care         Lisandro BARKLEYY   350.1.13.10         

ity of



                                                [place]   4.2.7.2.686         [place]

[place]



                                                        [phone]         Medi

natalie



                                                        403             Branch

 

        2022-06-15 2022-06-15 Refill          Trip, [facility]    1.2.840.114 [id number]

68 Univers



        00:00:00 00:00:00                 Jun  PRIMARY 350.1.13.10         it

y of



                                                CARE    4.2.7.2.686         [place]

[place]



                                                PAV[place]ION [phone]         Me

dical



                                                        390             Branch

 

        2022 Transition         RAJI Will  1.2.840.114 942

[id number] Univers



        00:00:00 00:00:00 of Care         Lisandro SUBRAMANIAN SETH   350.1.13.10         

ity of



                                                [place]   4.2.7.2.686         [place]

[place]



                                                        [phone]         Medi

natalie



                                                        403             [place]

 

        2022 Inpatient X       ANTHONY [facility]     [id number]

[id number] Univers



        19:48:00 16:00:00                 MOSTAFA                         ity [facility]

 

        2022 [facility]         BolivarSofia gonsales  1

.2.840.114 

[id number]                                Univers



        19:48:00 16:00:00 Encounter         Payton Osborne   350.1.1

3.10         ity of



                                        Mckenna Cruz [facility] 4.2.7.2.686         [place]



                                                        [phone]         Medi

natalie



                                                        091             Branch

 

        2021 Technician         Bhakti, Eduardo [facility]    1.2.8

40.114 [id number] 

Univers



        10:15:00 10:30:00 Visit           Radha Kirkland 350.1.13.10

         ity of



                                                [place] 4.2.7.2.686         [place]

[place]



                                                PROFESSIO [phone]         Me

dical



                                                NAL     353             Branch



                                                BUILDING                 

 

        2021 Outpatient R       APOLLO [facility]    [id number]

[id number] Univers



        10:15:00 10:15:00                 RADHA brown [facility]

 

        2021 Orders          Doctor KONG    1.2.840.114 [id number]

96 Univers



        00:00:00 00:00:00 Only            Unassigned, OMARI   350.1.13.10       

  ity of



                                        [facility] 4.2.7.2.686         [place]

as



                                                        [phone]         [address]

 

        2021 Outpatient MAGUI       APOLLO [facility]    [id number]

[id number] Univers



        09:45:00 09:45:00                 RADHA antunezpat [facility]

 

        2021 Technician         Bhakti, Eduardo Lab [facility]    1.2.8

40.114 [id number] 

Univers



        09:27:21 09:42:21 Visit           Radha Kirkland [place] 350.1.13.10

         ity of



                                                [place] 4.2.7.2.686         [place]

[place]



                                                [place] [phone]         Me

dical



                                                nal     353             [facility]                 

 

        2021 Orders          Doctor  DILAN    1Audra2.840.114 [id number]

11 Univers



        00:00:00 00:00:00 Only            Unassigned, OMARI   350.1.13.10       

  ity of



                                        [facility] 4.2.7.2.686         [place]

as



                                                        [phone]         [address]

 

        2020 [facility]          [facility]    1.2.845.334 7544

6127 Univers



        15:34:08 16:20:11 Visit           Yousuf [facility]  350.1.13.10         it

y of



                                                Surgical 4.2.7.2.686         [place]

as



                                                Specialti [phone]         Me

dical



                                                es      198             [facility]                 

 

        [facility]    [id number]

[id number] Univers



        15:15:00 15:15:00                 YOUSUF brown [facility]

 

        2020 Orders          Doctor  DILAN    1.2.840.114 [id number]

75 Univers



        00:00:00 00:00:00 Only            Unassigned, OMARI   350.1.13.10       

  ity of



                                        [facility] 4.2.7.2.686         [place]

as



                                                        [phone]         [address]

 

        2020 Outpatient                 [facility] 3429

[id number] Memoria



        15:51:00 05:59:00                         magui Patton 06      l



                                                        [facility]         

 

        2020 Outpatient         DORY BalderasSE    [facility]    [id number]

975 



        09:51:00 23:59:00                 Maurice                 06      

 

        2020 Emergency                 [facility] 42289

74769 [facility]



        17:42:31 23:53:00                         r       Abdi 07      [facility]         

 

        2020 Outpatient         Dilan Shaver [facility]    [facility]    3429

[id number] 



        11:42:31 17:53:00                 Q                       07      

 

        2020 Emergency E       SOSA, MHSE    [facility]    7507    





        11:42:00 11:42:00                 [facility]

a



                                                                        [facility]

 

        2020 Outpatient         SHERRIE,  MHSE    PUL     [id number]   

 MH



        09:51:00 09:51:00                 [facility]

[facility]



                                                                        [facility]

 

        2019 2019-12-15 Outpt Diag                 nullFlavo [facility]    [id number]

[id number] Memoria



        17:20:00 05:59:00 Services                 r       Outpatient 12      [facility]         

 

        2019 Outpatient         Lee, MHOIP   OIP   [id number]

985 



        11:20:00 23:59:00                 Erika                 [place]      



                                        Steffi                          

 

        2019-10-18 2019-10-19 Outpt Diag                 nullFlavo [facility]    [id number]

[id number] Memoria



        20:59:00 04:59:00 Services                 r       Outpatient 11      [facility]         

 

        2019-10-18 2019-10-18 Outpatient         Chidi, MHOIP   OIP   [id number]

985 



        15:59:00 23:59:00                 Jose BLAKE                 11      

 

        2019-10-03 2019-10-03 Emergency                 nullFlavo [facility] 28469

[id number] Memoria



        21:20:21 23:52:00                         [facility] 05      [facility]         

 

        2019-10-03 2019-10-03 Outpatient         DEV Pham    [facility]    342

[id number] 



        16:20:21 18:52:00                 Crystal                 Yg      



                                        Alistairsumit                         

 

        2019 Outpt Diag                 nullFlavo [facility]    01147

[id number] Memoria



        16:14:00 04:59:00 Services                 r       Outpatient 10      [facility]         

 

        2019 Outpatient         Batchu, MHOIP   MHOIP   [id number]

[id number] 



        11:14:00 23:59:00                 Vishalakshm                 10      



                                        i                               

 

        2019 Outpt Diag                 nullFlavo [facility]    [id number]

[id number] Memoria



        20:35:00 04:59:00 Services                 r       Outpatient 09      l



                                                        Imaging         [facility]         

 

        2019 Outpatient         Batchu, MHOIP   OIP   [id number]

[id number] 



        15:35:00 23:59:00                 Vishalakshm                 09      



                                                                       

 

        2019 Outpt Diag                 nullFlavo [facility]    [id number]

[id number] Memoria



        15:33:00 04:59:00 Services                 r       Outpatient 08      l



                                                        [facility]         

 

        2019 Outpatient         Batchu, MHOIP   MHOIP   [id number]

[id number] 



        10:33:00 23:59:00                 Vishalakshm                 08      



                                                                       

 

        2019 Outpt Diag                 nullFlavo [facility]    [id number]

[id number] Memoria



        18:01:00 05:59:00 Services                 r       Outpatient 07      l



                                                        [facility]         

 

        2019 Outpatient         Wilson, MHOIP   MHOIP   [id number]

985 



        12:01:00 23:59:00                 Vaibhave Y                 2019 Outpt Diag                 nullFlavo [facility]    [id number]

[id number] Memoria



        20:29:00 05:59:00 Services                 r       Outpatient 06      l



                                                        [facility]         

 

        2019 Outpatient         Wilson, MHOIP   MHOIP   [id number]

985 



        14:29:00 23:59:00                 Vaibhave Y                 2019 Outpatient         Wilson, MHOIP   MHOIP   [id number]

985 



        14:29:00 23:59:00                 Vaibhave Y                 2018 Observatio                 nullFlavo [facility] 3429

[id number] Memoria



        17:55:00 01:15:00 [facility] 04      l



                                                        [facility]         

 

        2018 Outpatient         DORY BalderasSE    [facility]    [id number]

975 



        12:55:00 20:15:00                 Maurice                 04      

 

        2018 Emergency                 nullFlavo [facility] 33153

93978 Memoria



        22:24:00 07:00:00                         r       Abdi 03      l



                                                        [facility]         

 

        2018 Outpatient         Jeff, MHSE    [facility]    [id number]

8975 



        17:24:00 02:00:00                 Kim Saldana                 03      

 

        2017 Outpt Diag                 nullFlavo [facility]    31276

[id number] Memoria



        21:08:00 05:59:00 Services                 r       Outpatient 04      l



                                                        [facility]         

 

        2017 Outpatient         Batchu, MHOIP   OIP   [id number]

[id number] 



        15:08:00 23:59:00                 [facility]                 04      



                                                                       

 

        2017 Outpt Diag                 nullFlavo [facility]    [id number]

[id number] Memoria



        13:46:00 04:59:00 Services                 r       Outpatient 03      l



                                                        [facility]            

 

        2017 Outpatient         Moeko, 2.16.840. 2.16.840.1. 3

[id number] 



        08:46:00 23:59:00                 Anatoli N 1.235313. [id number] 03    

  



                                                3.615.30 5.30            

 

        2017 Outpt Diag                 nullFlavo [facility]    20154

[id number] Memoria



        20:38:00 04:59:00 Services                 r       Outpatient 02      [facility]         

 

        2017 Outpatient         [facility]   [id number]

985 



        15:38:00 23:59:00                 Vishalaks                 02      



                                        i                               

 

        2017 Outpatient         C_Hall  MM[facility]     MM[facility]     [id number]

020 [place]



        04:11:00 04:11:00                                         0609    



                                                                        Medical



                                                                        Group

 

        2016 Bedded                  [facility] [id number]

975 Memoria



        11:58:00 20:10:00 Outpatient                 r       Abdi 02      l



                                                        [facility]         

 

        2016 Outpatient         Razia,   [facility]    SE    [id number]

975 



        06:58:00 15:10:00                 Denny Combs                           

 

        2016 Outpatient                 [facility] 3429

[id number] Memoria



        19:47:00 04:59:00                         r       Abdi 19      [facility]         

 

        2016 Outpatient         Razia,   [facility]    [id number]

961 



        14:47:00 23:59:00                 Nisheeth                 19      



                                        [facility]                           

 

        2015 Outpatient                 [facility] 3429

[id number] Memoria



        14:08:00 05:59:00                         magui Patton 01      l



                                                        [facility]         

 

        2015 Outpatient         [place], [facility]    [id number]

[id number] 



        08:08:00 23:59:00                 [facility]                 [facility]      



                                        i                               







Results







           Test Description Test Time  Test Comments Results    Result Comments 

Source









                    POC ARTERIAL BLOOD GAS 2023 10:21:00 









                      Test Item  Value      Reference Range Interpretation Comme

nts









             POC ARTERIAL BLOOD GAS PH (test code = POCPHA) 7.367        7.35-7.

45    N            

 

             POC ARTERIAL BLOOD GAS PCO2 (test code = WOMPUW8T) 45.1 mmHg    35.

0-45      H            

 

             POC TCO2 ARTERIAL (test code = POCTCO2) 27.3                       

            

 

             POC ARTERIAL BLOOD GAS PO2 (test code = WFNUT5E) 71.9 mmHg    80-10

0.0     L            

 

             POC HCO3 ARTERIAL (test code = NMHGDM9Q) 25.9 MMOL/L  22.0-26.0    

N            

 

             POC BASE EXCESS (test code = POCBEA) 0.3 MMOL/L   -4.0-4.0     N   

         

 

             POC O2 SATURATION (test code = POCO2S) 93.6 %              N 

           



BASIC METABOLIC [id number] 10:21:00





             Test Item    Value        Reference Range Interpretation Comments

 

             SODIUM (test code = NA/ABG) 139 mmol/L   134-147      N            

 

             POTASSIUM (test code = K/ABG) 3.8 mmol/L   3.4-5.0      N          

  

 

             CHLORIDE (test code = CL/ABG) 106 mmol/L   100-108      N          

  

 

             CREATININE ABG (test code = 0.5 mg/dL    0.6-1.0      L            



             CREAABG)                                            

 

             POC IONIZED CALCIUM (test code = 1.20 MMOL/L  1.12-1.32    N       

     



             POCCA)                                              

 

             POC GLUCOSE (test code = POCGLU) 141 MG/DL           H       

     



HEMOGLOBIN [id number] 10:21:00





             Test Item    Value        Reference Range Interpretation Comments

 

             HEMOGLOBIN ABG (test code = 11.6 G/DL    11.0-15.0    N            



             HGB/ABG)                                            



[id number] 10:21:00





             Test Item    Value        Reference Range Interpretation Comments

 

             HEMATOCRIT (test code = HCT/ABG) 34 %         33.0-45.0    N       

     



POC LACTIC [id number] 10:21:00





             Test Item    Value        Reference Range Interpretation Comments

 

             POC LACTIC ACID (test code = 0.5 mmol/l   0.9-1.7      L           

 



             POCLAC)                                             



[id number] 08:30:00





             Test Item    Value        Reference Range Interpretation Comments

 

             ACT-ISTAT (test code 239 SEC             H            Perform

ed by certified



             = ACTI)                                              at [facility]



- CT ANGIO [id number] 00:00:00
************************************************************[facility] : 1940 Sex: 
F************************************************************ Name: 
ROBBY MCKNIGHT [facility] : 1940 Age/S: 82 / F [address] [place] [address] Bl Unit #: [id number] Loc:  [place] [address] Phys: Tapan Herndon MD 
Acct: [id number] Dis Date: Status: ADM INPHONE #: [phone] Exam Date: 
2023 1034 FAX #: [phone] Reason: VISUALIZE AORTIC ANEURYSM EXAMS:  
CPT CODE: [id number] CT ANGIO CHEST 08186 PROCEDURE INFORMATION: Exam: CTA Chest 
With Contrast CTA Abdomen With Contrast Exam date and time: 2023 10:26 AM 
Age: 82 years old Clinical indication: Condition or disease; Other: Visualize 
aortic aneurysm TECHNIQUE: Imaging protocol: Computed tomographic angiography of
 the chest with contrast. Exam focused on the arteries. Computed tomographic
angiography of the abdomen with contrast. Exam focused on the arteries. 3D 
rendering (Not supervisedby radiologist): MIP and/or 3D reconstructed images 
were created by the technologist. Radiation optimization: All CT scans at this 
facility use at least one of these dose optimization techniques: automated 
exposure control; mA and/or kV adjustment per patient size (includes targeted 
exams where dose is matched to clinical indication); or iterative 
reconstruction. Contrast material: ISOUVE; Contrast volume: 100 ml; Contrast 
route: INTRAVENOUS (IV);  REPORTING DATA: Count of CT and Cardiac NM exams in
prior 12 months: This patient has received 0 known CTs and 0 known cardiac 
nuclear medicine studies in the 12 months prior to the current study. 
COMPARISON: DX XR CHEST 2 V 2023 11:53 AM FINDINGS:VASCULATURE: Pulmonary 
arteries: Normal. No pulmonary emboli. Aorta:  Aortic root: 2.8 cm Aorta at si
notubular junction: 2.8 cm Mid ascending aorta: 4.5 cm Mid transverse aortic 
arch: 2.8 cm Descendingthoracic aorta at pulmonary trunk level: 2.5 cm Distal 
descending thoracic aorta at diaphragmatic hiatus: 2.4 cm Abdominal aorta: 14 mm
 diameter Celiac trunk and mesenteric arteries: No occlusion or  significant 
stenosis. Renal arteries: No occlusion or significant stenosis. CHEST: Lungs: 
Mild interstitial septal thickening. Perihilar bronchial wall thickening No 
consolidation. No masses. PAGE 1 Signed Report (CONTINUED) Name: 
ROBBY MCKNIGHT [facility] : 1940 Age/S: 82 / F [address] [place] [address] Blvd Unit #: [id number] Loc: [place] [address] Phys: Tapan Herndon MD  
Acct: [id number] Dis Date: Status: ADM IN PHONE #: [phone] Exam Date: 
2023 1034 FAX #: [phone] Reason: VISUALIZE AORTIC ANEURYSM EXAMS: CPT
 CODE: [id number] CT ANGIO CHEST 16700  (Continued) Pleural spaces:Unremarkable. 
No pneumothorax. No pleural effusion. Heart: Thickened aortic valve leaflets 
with calcification. Calcifications at the mitral valve annulus. There is 
opacification/flow within the left atrial appendage despite a occlusion device. 
Cardiomegaly. No pericardial effusion. ABDOMEN AND PELVIS:Liver: No mass. 
Gallbladder and bile ducts: Unremarkable. No calcified stones. No ductal 
dilation. Pancreas: Unremarkable. No mass. No ductal dilation. Spleen: 
Unremarkable. No splenomegaly. Adrenal glands: Unremarkable. No mass. Kidneys 
and ureters: Unremarkable. No solid mass. No hydronephrosis. Stomach and bowel: 
Moderate volume diffuse formed colonic fecal burden No obstruction. No mucosal 
thickening. Intraperitoneal space: Unremarkable. No free air. No significant 
fluid collection.  Lymph nodes: Unremarkable. No enlarged lymph nodes. 
Bones/joints: Multilevel thoracolumbar degenerative disc disease. No acute 
fracture. Soft tissues: Unremarkable.  IMPRESSION: 1. Aneurysmal mid ascending 
thoracic aorta, 4.5 cm diameter. No dissection. This may be secondary to aortic 
valve disease given leaflet thickening and calcification. 2. Mitral valve 
annulus calcification. 3. Continued flow/opacification within the left atrial 
appendage despite an occlusion device. 4. Cardiomegaly, mild interstitial septal
 thickening/edema. 5. Mild perihilar bronchial wall thickening suggesting 
chronic bronchitis. No acute pneumonia. 6. Moderate diffuse formed colonic fecal
 burden/constipation. ** Electronically Signedby DENIZ Vega ** ** on
 2023 at 1207 ** Reported and signed by: Jose Vega M.D. PAGE 2 
Signed Report (CONTINUED) Name: ROBBY MCKNIGHT [facility]  : 
1940 Age/S: 82 / F [address] [place] [address] Unit #: [id number] Loc: 
[place] [address] Phys: Tapan Herndon MD  Acct: [id number] Dis Date: Status: 
ADM IN PHONE #: [phone] Exam Date: 2023 1034  FAX #: [phone] 
Reason: VISUALIZE AORTIC ANEURYSM EXAMS: CPT CODE: [id number] CT ANGIO CHEST  
[address]024 (Continued) CC: Tapan Herndon MD  Technologist:RT Prosper(R)(CT) 
CTDI: DLP: Trnscb Date/Time: 2023 (1207) Marissa  PAGE 3 Signed Report- 
CT ANGIO [id number] 00:00:00
************************************************************[facility] : 1940 Sex: 
F************************************************************ Name: 
ROBBY MCKNIGHT [facility] : 1940 Age/S: 82 / F [address] [place] [address] Blvd  Unit #: [id number] Loc: [place] [address] Phys: Tapan Herndon MD 
Acct: [id number] Dis Date:  Status: ADM INPHONE #: [phone] Exam Date: 
2023 1034 FAX #: [phone] Reason: VISUALIZE AORTIC ANEURYSM  EXAMS: 
CPT CODE: [id number] CT ANGIO ABDOMEN 32401 PROCEDURE INFORMATION: Exam: CTA 
Chest With Contrast CTA Abdomen With Contrast Exam date and time: 2023 
10:26 AM Age: 82 years old Clinical indication: Condition or disease; Other: 
Visualize aortic aneurysm TECHNIQUE: Imaging protocol: Computedtomographic 
angiography of the chest with contrast. Exam focused on the arteries. Computed 
tomographic angiography of the abdomen with contrast. Exam focused on the 
arteries. 3D rendering (Not supervised by radiologist): MIP and/or 3D 
reconstructed images were created by the technologist. Radiation optimization: 
All CT scans at this facility use at least one of these dose optimization 
techniques: automated exposure control; mA and/or kV adjustment per patient size
 (includes targeted exams where doseis matched to clinical indication); or 
iterative reconstruction. Contrast material: ISOUVE; Contrastvolume: 100 ml; 
Contrast route: INTRAVENOUS (IV); REPORTING DATA: Count of CT and Cardiac NM 
exams in prior 12 months: This patient has received 0 known CTs and 0 known 
cardiac nuclear  medicine studies in the 12 months prior to the current study. 
COMPARISON: DX XR CHEST 2 V 2023 11:53 AM FINDINGS: VASCULATURE: Pulmonary 
arteries: Normal. No pulmonary emboli. Aorta: Aortic root: 2.8 cm Aorta at s
inotubular junction: 2.8 cm Mid ascending aorta: 4.5 cm Mid transverse aortic 
arch: 2.8 cm  Descending thoracic aorta at pulmonary trunk level: 2.5 cm Distal 
descending thoracic aorta at diaphragmatic hiatus: 2.4 cm Abdominal aorta: 14 mm
 diameter Celiac trunk and mesenteric arteries: No occlusion or significant 
stenosis. Renal arteries: No occlusion or significant stenosis. CHEST: Lungs: 
Mild interstitial septal thickening. Perihilar bronchial wall thickening No 
consolidation. No masses. PAGE 1 Signed Report (CONTINUED) Name: 
ROBBY MCKNIGHT [facility] : 1940 Age/S: 82 / F [address] [place] [address] Unit #: [id number] Loc: [place] [address] Phys: Tapan Herndon MD 
Acct: [id number] Dis Date: Status: ADM IN PHONE #: [phone] Exam Date: 
2023 1034 FAX #: [phone] Reason: VISUALIZE AORTIC ANEURYSM EXAMS: CPT
 CODE: [id number] CT ANGIO ABDOMEN 78678 (Continued) Pleural spaces: 
Unremarkable. No pneumothorax. No pleural effusion. Heart: Thickened aortic 
valve leaflets with calcification. Calcifications at the mitral valve annulus. 
There is opacification/flow within the left atrial appendage despite a occlusion
 device. Cardiomegaly. No pericardial effusion. ABDOMEN AND PELVIS: Liver: No 
mass. Gallbladder and bile ducts: Unremarkable. No calcified stones. No ductal 
dilation. Pancreas: Unremarkable. No mass. No ductal dilation. Spleen: 
Unremarkable. No splenomegaly. Adrenal glands: Unremarkable. No mass. Kidneys 
and ureters: Unremarkable. No solid mass. No hydronephrosis. Stomach and bowel: 
Moderate volume diffuse formed colonic fecal burden No obstruction. No mucosal 
thickening. Intraperitoneal space: Unremarkable. No free air. No significant 
fluid collection. Lymph nodes: Unremarkable. No enlarged lymph nodes. 
Bones/joints: Multilevel thoracolumbar degenerative disc disease. No acute 
fracture. Soft tissues: Unremarkable. IMPRESSION: 1. Aneurysmal mid ascending 
thoracicaorta, 4.5 cm diameter. No dissection. This may be secondary to aortic 
valve disease given leaflet thickening and calcification. 2. Mitral valve 
annulus calcification. 3. Continued flow/opacification within the left atrial 
appendage despite an occlusion device. 4. Cardiomegaly, mild interstitial septal
 thickening/edema. 5. Mild perihilar bronchial wall thickening suggesting 
chronic bronchitis. No acute pneumonia. 6. Moderate diffuse formed colonic fecal
 burden/constipation. ** Electronically Signed by DENIZ Vega ** ** 
on 2023 at 1207  ** Reported and signed by: Jose Vega M.D. PAGE 2
 Signed Report (CONTINUED) Name: ROBBY MCKNIGHT [facility] : 
1940 Age/S: 82 / F [address] [place] [address] Blvd Unit #: [id number] Loc: 
[place] [address]  Phys: Tapan Herndon MD Acct:[id number] Dis Date: Status: 
ADM IN PHONE #: [phone] Exam Date: 2023 1034 FAX #: [phone] 
Reason: VISUALIZE AORTIC ANEURYSM EXAMS: CPT CODE: [id number] CT ANGIO ABDOMEN 
[address]367 (Continued) CC: Tapan Herndon MD  Technologist:RT Prosper(R)(CT) 
CTDI: DLP: Trnscb Date/Time: 2023(1207) tJHONNY Orig Print D/T: S: 
2023 (120) PAGE 3 Signed [id number] 13:14:00





             Test Item    Value        Reference Range Interpretation Comments

 

             PREALBUMIN (test code = PREALB) 20.3 mg/dL   16.0-40.0    N        

    



BASIC METABOLIC [id number] 13:14:00





             Test Item    Value        Reference Range Interpretation Comments

 

             SODIUM (test code = 136 mEq/L    134-147      N            



             NA)                                                 

 

             POTASSIUM (test code 3.8 mEq/L    3.4-5.0      N            



             = K)                                                

 

             CHLORIDE (test code 101 mEq/L    100-108      N            



             = CL)                                               

 

             CARBON DIOXIDE (test 31 mEq/l     21-33        N            



             code = CO2)                                         

 

             ANION GAP (test code 7            0-20         N            



             = GAP)                                              

 

             GLUCOSE (test code = 103 mg/dL           N            



             GLU)                                                

 

             BLOOD UREA NITROGEN 12 mg/dL     7-18         N            



             (test code = BUN)                                        

 

             GLOMERULAR   85.2         70-80        H            The Glomerular



             FILTRATION RATE                                        Filtration R

ate is a



             (test code = GFR)                                        calculated



                                                                 parameterbased 

on serum



                                                                 Creatinine, pat

ient age



                                                                 and sex. GFR va

luesless



                                                                 than 60 mL/min/

1.73



                                                                 square meters a

re



                                                                 indicative ofCh

ronic



                                                                 Kidney Disease.

 Values



                                                                 less than 15



                                                                 mL/min/1.73squa

re meters



                                                                 indicate Kidney

 failure.



                                                                 The calculation

 forGFR



                                                                 is based on the

 CKD-EPI



                                                                 () calculat

ion. This



                                                                 formulais race



                                                                 indifferent and

 is the



                                                                 recommended for

[place] for



                                                                 GFRby the Natio

nal



                                                                 Kidney Foundati

on for



                                                                 Adults.The GFR 

will not



                                                                 calculate if th

e sex is



                                                                 unknown or if



                                                                 thepatient's ag

e is <18



                                                                 years.

 

             CREATININE (test 0.7 mg/dL    0.6-1.3      N            



             code = CREAT)                                        

 

             CALCIUM (test code = 9.0 mg/dL    8.0-10.5     N            



             CA)                                                 



PROTHROMBIN [id number] 12:50:00





             Test Item    Value        Reference Range Interpretation Comments

 

             PROTHROMBIN TIME 21.8 SECONDS 9.3-12.9     H            



             PATIENT (test code =                                        



             PTP)                                                

 

             INTERNATIONAL NORMAL 2.0          0.8-1.2      H             TARGET

 INR BY



             RATIO (test code =                                        INDICATIO

N Indication



             INR)                                                INR1. Prophylax

is of



                                                                 venous thrombos

is 2.0



                                                                 - 3.0 (orthoped

ic



                                                                 surgery), Proph

ylaxis



                                                                 of venous throm

bosis



                                                                 (other than hig

h-risk



                                                                 surgery), Treat

ment of



                                                                 Deep Vein



                                                                 Thrombosis/Pulm

onary



                                                                 Embolism, Preve

ntion



                                                                 of systemic emb

olism -



                                                                 Tissue heart va

lves,



                                                                 Acute Myocardia

l



                                                                 Infarction (to 

prevent



                                                                 systemic emboli

sm),



                                                                 Valvular heart



                                                                 disease, Atrial



                                                                 Fibrillation,



                                                                 Bileaflet mecha

nical



                                                                 valve in aortic



                                                                 position.2. Mec

hanical



                                                                 prosthetic valv

es



                                                                 (high risk), 2.

5 - 3.5



                                                                 Presence of Lup

us



                                                                 Anticoagulant o

r



                                                                 Antiphospholipi

d



                                                                 Antibodies, Pre

vention



                                                                 of systemic emb

olism -



                                                                 Acute Myocardia

l



                                                                 Infarction (to 

prevent



                                                                 recurrent infar

ct).



CBC W/AUTO [id number] 12:43:00





             Test Item    Value        Reference Range Interpretation Comments

 

             WHITE BLOOD CELL (test code = 7.2 x10 3/uL 4.5-11.0     N          

  



             WBC)                                                

 

             RED BLOOD CELL (test code = 5.24 x10 6/uL 3.54-5.02    H           

 



             RBC)                                                

 

             HEMOGLOBIN (test code = HGB) 13.4 g/dL    11.0-15.0    N           

 

 

             HEMATOCRIT (test code = HCT) 42.5 %       33.0-45.0    N           

 

 

             MEAN CELL VOLUME (test code = 81.1 fL      81.0-99.0    N          

  



             MCV)                                                

 

             MEAN CELL HGB (test code = MCH) 25.6 pg      27.0-33.0    L        

    

 

             MEAN CELL HGB CONCETRATION 31.5 g/dL    33.0-37.0    L            



             (test code = MCHC)                                        

 

             RED CELL DISTRIBUTION WIDTH CV 18.4 %       11.5-14.5    H         

   



             (test code = RDW)                                        

 

             RED CELL DISTRIBUTION WIDTH SD 52.9 fL      37.0-54.0    N         

   



             (test code = RDW-SD)                                        

 

             PLATELET COUNT (test code = 252 x10 3/uL 150-400      N            



             PLT)                                                

 

             MEAN PLATELET VOLUME (test code 10.2 fL      7.0-9.0      H        

    



             = MPV)                                              

 

             NEUTROPHIL % (test code = NT%) 71.5 %       56.0-77.0    N         

   

 

             IMMATURE GRANULOCYTE % (test 0.4 %        0.0-2.0      N           

 



             code = IG%)                                         

 

             LYMPHOCYTE % (test code = LY%) 19.3 %       14.0-32.0    N         

   

 

             MONOCYTE % (test code = MO%) 6.0 %        4.8-9.0      N           

 

 

             EOSINOPHIL % (test code = EO%) 1.8 %        0.3-3.7      N         

   

 

             BASOPHIL % (test code = BA%) 1.0 %        0.0-2.0      N           

 

 

             NUCLEATED RBC % (test code = 0.0 %        0-0          N           

 



             NRBC%)                                              

 

             NEUTROPHIL # (test code = NT#) 5.17 x10 3/uL 2.0-7.6      N        

    

 

             IMMATURE GRANULOCYTE # (test 0.03 x10 3/uL 0.00-0.03    N          

  



             code = IG#)                                         

 

             LYMPHOCYTE # (test code = LY#) 1.39 x10 3/uL 1.0-3.8      N        

    

 

             MONOCYTE # (test code = MO#) 0.43 x10 3/uL 0.1-0.8      N          

  

 

             EOSINOPHIL # (test code = EO#) 0.13 x10 3/uL 0.0-0.2      N        

    

 

             BASOPHIL # (test code = BA#) 0.07 x10 3/uL 0.0-0.2      N          

  

 

             NUCLEATED RBC # (test code = 0.00 x10 3/uL 0.0-0.1      N          

  



             NRBC#)                                              

 

             MANUAL DIFF REQUIRED (test code NO                                 

    



             = MDIFF)                                            



- XR CHEST 2 -70-80 00:00:00
************************************************************[facility] : 1940 Sex: 
F************************************************************ FAX: Tapan Perez [phone] [place]:  St: 
PRE------------------------------------------------
------------------------------- Name: ROBBY MCKNIGHT [facility] : 
1940 Age/S: 82/F [address] [place] [address] Unit #: [id number] Loc: [place] [address] Phys: Tapan Herndon MD Acct: [id number] Dis Date: Status: 
PRE IN PHONE #: [phone] Exam Date: 2023 1155 FAX #: [phone] 
Reason: PRE OP EXAMS: CPT CODE: [id number] XR CHEST 2 V 54308 PROCEDURE 
INFORMATION: Exam: XR Chest Exam date and time: 2023 11:53 AM Age: 82 years
 old Clinical indication: Pre-operative exam;Cardiovascular screening and 
respiratory screening exam; Additional info: Pre op TECHNIQUE: Imaging protocol:
 Radiologic exam of the chest. Views: 2 views. PA and Lateral COMPARISON: DX XR 
CHEST 2 V 2018 6:09 AM FINDINGS: Lungs: No consolidation. Pleural spaces: 
No pleural effusion. Heart/Mediastinum: The heart and vascular markings are 
stable. Bones/joints: No gross acute findings. Scoliosis. IMPRESSION: No acute 
cardiopulmonary findings  ** Electronically Signed by MONICA Jin ** ** 
on 2023 at 1425 ** Reported and signed by: Michell Jin D.O. CC: Tapan Herndon MD Technologist: RT Hannah(R) Trnscrd Date/Time/By: 2023 
([id number]) : By: [email or web address] Orig Print D/T: S: 2023 (5962) PAGE 1  Signed 
ReportRADRPT2023-03-10 21:12:04





             Test Item    Value        Reference Range Interpretation Comments

 

             RADRPT (test code Radiation Dose CTDIVOL = 0                       

    



             = RADRPT)    (mGy): DLP = 116.22                           



                          (mGy-cm)PROCEDURE                           



                          INFORMATION: Exam: CT Chest                           



                          Without Contrast;                           



                          Diagnostic Exam date and                           



                          time: 3/9/2023 3:41 PM Age:                           



                          82 years old Clinical                           



                          indication: /r91.1                           



                          TECHNIQUE: Imaging                           



                          protocol: Diagnostic                           



                          computed tomography of the                           



                          chest without contrast.                           



                          Radiation optimization: All                           



                          CT scans at this facility                           



                          use at least one of these                           



                          dose optimization                           



                          techniques: automated                           



                          exposure control; mA and/or                           



                          kV adjustment per patient                           



                          size (includes targeted                           



                          exams where dose is matched                           



                          to clinical indication); or                           



                          iterative reconstruction.                           



                          REPORTING DATA: Count of CT                           



                          and Cardiac NM exams in                           



                          prior 12 months: This                           



                          patient has received 2                           



                          known CTs and 0 known                           



                          cardiac nuclear medicine                           



                          studies in the 12 months                           



                          prior to the current study.                           



                          COMPARISON: CHEST ABDOMEN                           



                          PELVIS W CONTRAST CT                           



                          10/14/2022 11:37 PM, CT                           



                          lung biopsy 10/19/2022                           



                          RADIATION DOSE METRICS:                           



                          Total DLP (mGy-cm): 116.22                           



                          FINDINGS: Lungs: Virtual                           



                          resolution of multiple                           



                          confluent bilateral                           



                          pulmonary infiltrates noted                           



                          previously, with residual                           



                          linear scarring in each                           



                          lobe. No mass or                           



                          endobronchial obstructing                           



                          lesion. No bronchiectasis.                           



                          Pleural spaces: No pleural                           



                          effusion. Heart:                           



                          Cardiomegaly, moderate                           



                          coronary artery                           



                          calcifications. No                           



                          pericardial effusion. Lymph                           



                          nodes: No gross adenopathy.                           



                          Vasculature: Ectatic aorta                           



                          with aneurysmal dilatation                           



                          of the ascending aorta,                           



                          diameter approximately 4.4                           



                          cm, unchanged.                           



                          Intraperitoneal space:                           



                          Unenhanced upper abdominal                           



                          images grossly                           



                          unremarkable. Bones/joints:                           



                          Kyphosis, dextroscoliosis,                           



                          moderate spondylosis and                           



                          multilevel degenerative                           



                          disc disease. Soft tissues:                           



                          Unremarkable. IMPRESSION:                           



                          1. Virtual complete                           



                          resolution of extensive                           



                          confluent bilateral                           



                          pulmonary infiltrates noted                           



                          previously, with residual                           



                          linear scarring. 2.                           



                          Aneurysmal dilatation                           



                          ascending aorta, diameter                           



                          approximately 4.4 cm,                           



                          unchanged. 3. Cardiomegaly,                           



                          moderate coronary artery                           



                          calcifications. 4.                           



                          Dextroscoliosis with                           



                          extensive spondylosis and                           



                          multilevel degenerative                           



                          disc disease. Jose Dempsey MD On 03/10/2023                           



                          15:10:59; [id number]                           



[facility]





             Test Item    Value        Reference Range Interpretation Comments

 

             RADRPT (test code PROCEDURE INFORMATION:                           



             = RADRPT)    Exam: XR Chest Exam date                           



                          and time: 2022 2:06 PM                           



                          Age: 81 years old Clinical                           



                          indication: Shortness of                           



                          breath; Additional info:                           



                          /r06.02 TECHNIQUE: Imaging                           



                          protocol: Radiologic exam                           



                          of the chest. Views: 2                           



                          views. PA and Lateral                           



                          COMPARISON: CHEST 1VIEW DX                           



                          10/19/2022 7:00 PM                           



                          FINDINGS: Tubes, catheters                           



                          and devices: Surgical clips                           



                          overlie the right axilla                           



                          region. Lungs: Pulmonary                           



                          emphysema identified within                           



                          the lungs. Linear density                           



                          identified within the                           



                          bilateral perihilar lungs.                           



                          Linear densities extend                           



                          into the upper lobes, worse                           



                          on the right side.                           



                          Bilateral interstitial and                           



                          alveolar opacities                           



                          demonstrate improvement                           



                          since 2022. Pleural                           



                          spaces: Pleural and lung                           



                          parenchymal scarring                           



                          identified within right                           



                          upper chest. No other                           



                          pleural effusion or                           



                          pneumothorax identified.                           



                          Heart/Mediastinum: Cardiac                           



                          silhouette appears                           



                          mild-to-moderately                           



                          enlarged. Vasculature:                           



                          Mild-to-moderate                           



                          atherosclerotic                           



                          calcification demonstrated                           



                          within the aorta. Tortuous                           



                          and ectatic aorta is                           



                          demonstrated. Bones/joints:                           



                          Dextroscoliosis of the                           



                          thoracic spine is                           



                          demonstrated. Diffusely                           



                          decreased bone density.                           



                          Generalized bony                           



                          degenerative changes.                           



                          IMPRESSION: 1.                           



                          Mild-to-moderately enlarged                           



                          cardiac silhouette. 2.                           



                          Pulmonary emphysema. 3.                           



                          Linear bilateral perihilar                           



                          and upper lobe pulmonary                           



                          atelectasis, scarring.                           



                          Right greater than left. 4.                           



                          Improvement of bilateral                           



                          pneumonia versus pulmonary                           



                          edema since prior study. 5.                           



                          Chronic findings. Radha Solomon MD On 11/10/2022                           



                          07:27:02; [id number]                           



[facility] CenterQnjdcdfAJUNHXCHE4859-08-04 10:28:00





             Test Item    Value        Reference Range Interpretation Comments

 

             Glucose Lvl (test code = Glucose Lvl) 94           70-99           

          



[facility]EytzzfwSYZYZEOTT6123-75-41 10:28:00





             Test Item    Value        Reference Range Interpretation Comments

 

             BUN (test code = BUN) 11           7-22                      



[facility]viewSxfutcmTJBGSHCNP0506-02-76 10:28:00





             Test Item    Value        Reference Range Interpretation Comments

 

             Creatinine Lvl (test code = Creatinine 0.48         0.50-1.40      

           



             Lvl)                                                



[facility]viewKisaomuNBTMGDGOC1628-09-37 10:28:00





             Test Item    Value        Reference Range Interpretation Comments

 

             Sodium Lvl (test code = Sodium Lvl) 138          135-145           

        



[facility]viewXcdyzedFEIFVNOSA6801-26-62 10:28:00





             Test Item    Value        Reference Range Interpretation Comments

 

             Potassium Lvl (test code = Potassium 3.9          3.5-5.1          

         



             Lvl)                                                



[facility]viewCkkeondBQXWHDZNZ9576-36-00 10:28:00





             Test Item    Value        Reference Range Interpretation Comments

 

             Chloride Lvl (test code = Chloride Lvl) 104                  

            



[facility]viewJemhzhwILQATHZPF5866-48-50 10:28:00





             Test Item    Value        Reference Range Interpretation Comments

 

             CO2 (test code = CO2) 29           24-32                     



[facility]viewMqbkcdqDTKLSEVWY8740-69-60 10:28:00





             Test Item    Value        Reference Range Interpretation Comments

 

             Calcium Lvl (test code = Calcium Lvl) 8.8          8.5-10.5        

          



[facility]viewEyanfnmHKYUXLXVU0698-41-04 10:28:00





             Test Item    Value        Reference Range Interpretation Comments

 

             AGAP (test code = AGAP) 8.9          10.0-20.0                 



[facility]viewQzvesyvZRJRDNHPI1384-07-17 10:28:00





             Test Item    Value        Reference Range Interpretation Comments

 

             eGFR (test code = eGFR) 94                                     



[facility]qzjhvXYAPTZZDMW3945-99-93 10:28:00





             Test Item    Value        Reference Range Interpretation Comments

 

             WBC (test code = WBC) 9.5          3.7-10.4                  



[facility] HospitalGwklcagHBVLMVBKUK6035-52-80 10:28:00





             Test Item    Value        Reference Range Interpretation Comments

 

             RBC (test code = RBC) 4.30         4.20-5.40                 



[facility] HospitalSpcgcvtUMGIKKGQXI0748-17-68 10:28:00





             Test Item    Value        Reference Range Interpretation Comments

 

             Hgb (test code = Hgb) 11.1         12.0-16.0                 



[facility] HospitalXfbhddtEADVOZXISL1768-96-43 10:28:00





             Test Item    Value        Reference Range Interpretation Comments

 

             Hct (test code = Hct) 35.0         36.0-48.0                 



[facility] HospitalWoetivhNCDDKCXKRI5742-83-98 10:28:00





             Test Item    Value        Reference Range Interpretation Comments

 

             MCV (test code = MCV) 81.4         80.0-98.0                 



[facility] HospitalAnkwomnNZQGRCIDRX1643-92-18 10:28:00





             Test Item    Value        Reference Range Interpretation Comments

 

             MCH (test code = MCH) 25.9 pg      27.0-31.0                 



[facility] HospitalVdyqxjxQXEQBVLIJY5580-48-07 10:28:00





             Test Item    Value        Reference Range Interpretation Comments

 

             MCHC (test code = MCHC) 31.8         32.0-36.0                 



[facility] HospitalIrbrucaASJSRTPWIF2272-96-24 10:28:00





             Test Item    Value        Reference Range Interpretation Comments

 

             RDW (test code = RDW) 17.3         11.5-14.5                 



[place] 271782-10-20 10:28:00





             Test Item    Value        Reference Range Interpretation Comments

 

             Platelet (test code = Platelet) 462          133-450               

    



[facility] HospitalIijloghLZAEKGKABU8674-00-35 10:28:00





             Test Item    Value        Reference Range Interpretation Comments

 

             MPV (test code = MPV) 6.6          7.4-10.4                  



[facility] HospitalDzqjhgrLBAADWCFCG0904-66-46 10:28:00





             Test Item    Value        Reference Range Interpretation Comments

 

             Segs (test code = Segs) 72.9         45.0-75.0                 



[facility] HospitalQiqlylfTDKRXYNQEQ4605-07-34 10:28:00





             Test Item    Value        Reference Range Interpretation Comments

 

             Lymphocytes (test code = Lymphocytes) 15.6         20.0-40.0       

          



[facility] HospitalHpbyspsUNLDAGEREF8990-17-04 10:28:00





             Test Item    Value        Reference Range Interpretation Comments

 

             Monocytes (test code = Monocytes) 9.8          2.0-12.0            

      



[place] 271782-10-20 10:28:00





             Test Item    Value        Reference Range Interpretation Comments

 

             Eosinophils (test code = 0.8          See_Comment                [A

utomated message] The



             Eosinophils)                                        system which ge

nerated



                                                                 this result tra

nsmitted



                                                                 reference range

: <=4.0.



                                                                 The reference r

tatianna was



                                                                 not used to int

erpret



                                                                 this result as



                                                                 normal/abnormal

.



[facility] HospitalJhvdaloMLDDDVWUAU1172-79-81 10:28:00





             Test Item    Value        Reference Range Interpretation Comments

 

             Basophils (test code = 0.9          See_Comment                [Aut

omated message] The



             Basophils)                                          system which ge

nerated



                                                                 this result tra

nsmitted



                                                                 reference range

: <=1.0.



                                                                 The reference r

tatianna was



                                                                 not used to int

erpret



                                                                 this result as



                                                                 normal/abnormal

.



[facility] HospitalKbfxonaEIQAGIAZUI3493-14-98 10:28:00





             Test Item    Value        Reference Range Interpretation Comments

 

             Neutrophils # (test code = Neutrophils 6.9          1.5-8.1        

           



             #)                                                  



[facility] HospitalHjvxtkzBYDYRAITNR0011-96-71 10:28:00





             Test Item    Value        Reference Range Interpretation Comments

 

             Lymphocytes # (test code = Lymphocytes 1.5          1.0-5.5        

           



             #)                                                  



[facility] HospitalEzzxctrWFJSCHUXLD7855-13-43 10:28:00





             Test Item    Value        Reference Range Interpretation Comments

 

             Monocytes # (test code 0.9          See_Comment                [Aut

omated message] The



             = Monocytes #)                                        system which 

generated



                                                                 this result tra

nsmitted



                                                                 reference range

: <=0.8.



                                                                 The reference r

tatianna was



                                                                 not used to int

erpret



                                                                 this result as



                                                                 normal/abnormal

.



[facility] HospitalGprngchMFLHFXISWF8932-66-49 10:28:00





             Test Item    Value        Reference Range Interpretation Comments

 

             Eosinophils # (test code 0.1          See_Comment                [A

utomated message] The



             = Eosinophils #)                                        system whic

h generated



                                                                 this result tra

nsmitted



                                                                 reference range

: <=0.5.



                                                                 The reference r

attianna was



                                                                 not used to int

erpret



                                                                 this result as



                                                                 normal/abnormal

.



[facility] HospitalHovrirrNWISVCGLBO8499-71-16 10:28:00





             Test Item    Value        Reference Range Interpretation Comments

 

             Basophils # (test code 0.1          See_Comment                [Aut

omated message] The



             = Basophils #)                                        system which 

generated



                                                                 this result tra

nsmitted



                                                                 reference range

: <=0.2.



                                                                 The reference r

tatianna was



                                                                 not used to int

erpret



                                                                 this result as



                                                                 normal/abnormal

.



[facility] CenterUaebefyEZQDHG9259-86-87 00:55:15





             Test Item    Value        Reference Range Interpretation Comments

 

             RADRPT (test code = PROCEDURE INFORMATION:                         

  



             RADRPT)      Exam: XR Chest Exam date                           



                          and time: 10/19/2022 7:00                           



                          PM Age: 81 years old                           



                          Clinical indication:                           



                          Shortness of breath;                           



                          Additional info: Shortness                           



                          of breath/sob, chest                           



                          heaviness TECHNIQUE:                           



                          Imaging protocol:                           



                          Radiologic exam of the                           



                          chest. Views: 1 view.                           



                          COMPARISON: CHEST 1VIEW DX                           



                          10/19/2022 2:20 PM                           



                          FINDINGS: Lungs: Lungs are                           



                          adequately expanded. There                           



                          is mild to moderate                           



                          streaky increased density                           



                          in the right upper lung                           



                          zone, mildly diminished                           



                          now as compared to prior                           



                          mild hazy increased                           



                          density is present in the                           



                          left upper lobe,                           



                          unchanged. Pleural spaces:                           



                          Unremarkable. No pleural                           



                          effusion. No pneumothorax.                           



                          Heart/Mediastinum: There                           



                          is moderate enlargement of                           



                          the cardiac silhouette                           



                          with tortuosity and                           



                          calcification of the                           



                          thoracic aorta.                           



                          Bones/joints:                           



                          Mild-to-moderate S-shaped                           



                          thoracolumbar scoliosis..                           



                          Soft tissues: Surgical                           



                          clips are present in the                           



                          right axilla. IMPRESSION:                           



                          There are streaky                           



                          increased densities in the                           



                          upper lung zones, greater                           



                          on the right than the                           



                          left, with mild                           



                          improvement in the right                           



                          lung zone now as compared                           



                          prior. Findings are                           



                          otherwise stable. Allyssa Cabrera MD On                           



                          10/19/2022 19:54:32;                           



                          [id number]                           



[id number] 21:43:37





             Test Item    Value        Reference Range Interpretation Comments

 

             RADRPT (test PROCEDURE INFORMATION: Exam: IR                       

    



             code = RADRPT) Percutaneous Biopsy Of The Lung                     

      



                          Or Mediastinum Exam date and                          

 



                          time: 10/19/2022 9:56 AM Age:                         

  



                          81 years old Clinical                           



                          indication: Biops/suspected                           



                          bronchoalveolar cell carcinoma                        

   



                          PROCEDURE:1. Percutaneous                           



                          biopsy of a right lower lobe                          

 



                          lung mass using CT guidance2.                         

  



                          Moderate SedationCLINICAL                           



                          INDICATION: 81-year-old female                        

   



                          with multiple bilateral lung                          

 



                          masses/consolidationsCOMPARISON                       

    



                          : CT chest on                           



                          10/14/2022CONSENT: The                           



                          procedure, risks, benefits, and                       

    



                          alternatives were discussed                           



                          with the patient and written                          

 



                          informed consent was obtained.                        

   



                          A "timeout" performed per                           



                          protocol prior to the                           



                          procedure.TECHNIQUE:CT imaging                        

   



                          performed at this location                           



                          utilizes radiation dose                           



                          optimization techniques which                         

  



                          include one or more of the                           



                          following: Automated exposure                         

  



                          control, adjustment of the mA                         

  



                          and/or kV according to patient                        

   



                          size, use of iterative                           



                          reconstruction                           



                          technique.: Dr. Dorantesoperative                           



                          diagnosis: Right lower lobe                           



                          lung massPostoperative                           



                          diagnosis: sameTotal radiation                        

   



                          dose: 448.05 mGycmEstimated                           



                          blood loss: Less than 10                           



                          ccModerate sedation: I                           



                          supervised moderate sedation                          

 



                          during this procedure. Patient                        

   



                          was continuously monitored by a                       

    



                          nurse using automated blood                           



                          pressure, electrocardiogram,                          

 



                          and pulse oximetry. The                           



                          moderate sedation record is                           



                          permanently stored in the                           



                          hospital information system.                          

 



                          The personal supervised                           



                          moderate sedation time was 16                         

  



                          minutes. Medications                           



                          administered: Versed 2 mg IV                          

 



                          and Fentanyl 100 mcg IV.The                           



                          patient was placed in a prone                         

  



                          position on the CT table.                           



                          Preprocedure CT was used to                           



                          demarcate the right lower lobe                        

   



                          mass/consolidation. The                           



                          posterior right chest wall of                         

  



                          the patient was prepped and                           



                          draped using sterile technique.                       

    



                          The overlying skin was                           



                          anesthetized with 1% lidocaine.                       

    



                          Using CT guidance, a 17-gauge                         

  



                          introducer needle was advanced                        

   



                          into the right lower lobe                           



                          mass/consolidation.                           



                          Subsequently, 4 core biopsies                         

  



                          (18-gauge x 2cm) were obtained                        

   



                          of this mass using a coaxial                          

 



                          technique. Samples were sent                          

 



                          for both histopathology and                           



                          microbial analysis. The needles                       

    



                          were removed at the end of the                        

   



                          procedure and sterile dressings                       

    



                          applied.Postprocedure imaging                         

  



                          demonstrated no immediate                           



                          complication. The patient was                         

  



                          stable throughout the                           



                          procedure.IMPRESSION:Successful                       

    



                          percutaneous biopsy of a right                        

   



                          lower lobe lung mass using CT                         

  



                          guidance.Rebel Bautista MD On 10/19/2022 16:42:59;                           



                          [id number]                           



[id number] 20:36:29





             Test Item    Value        Reference Range Interpretation Comments

 

             RADRPT (test code PROCEDURE INFORMATION:                           



             = RADRPT)    Exam: XR Chest Exam date                           



                          and time: 10/19/2022 2:20                           



                          PM Age: 81 years old                           



                          Clinical indication: /mass,                           



                          post lung biopsy procedure                           



                          TECHNIQUE: Imaging                           



                          protocol: Radiologic exam                           



                          of the chest. Views: 1                           



                          view. COMPARISON: 1. CHEST                           



                          ABDOMEN PELVIS W CONTRAST                           



                          CT 10/14/2022 11:37 PM 2.                           



                          CHEST 1VIEW DX 10/8/2022                           



                          12:25 PM FINDINGS: Lungs:                           



                          Stable bilateral                           



                          consolidative opacities                           



                          more severe on the right.                           



                          Pleural spaces: No                           



                          pneumothorax or pleural                           



                          effusion.                              



                          Heart/Mediastinum: Stable                           



                          enlarged of the cardiac                           



                          silhouette. Bones/joints:                           



                          Dextroscoliosis of the                           



                          thoracic spine. Soft                           



                          tissues: Right axillary                           



                          surgical clips. IMPRESSION:                           



                          1. No pneumothorax. 2.                           



                          Stable bilateral                           



                          consolidative                           



                          opacities.Ailyn Mock MD On                           



                          10/19/2022 15:35:56;                           



                          [id number]                           



[facility] [id number] 15:45:00





             Test Item    Value        Reference Range Interpretation Comments

 

             AFB Stain (test code = No Acid Fast Bacilli                        

   



             AFB Stain)   Seen On Smear                           



[facility] w/[id number] 15:45:00





             Test Item    Value        Reference Range Interpretation Comments

 

             Culture: AFB w/Smear (test Culture in Process                      

     



             code = Culture: AFB                                        



             w/Smear)                                            



[facility]annG[facility] Stain [id number] 15:45:00





             Test Item    Value        Reference Range Interpretation Comments

 

             Gram Stain Report Few Wbc'S; No Organisms                          

 



             (test code = Gram Seen                                   



             Stain Report)                                        



[facility] Aspirate/Body Fluid/Tissue2022-10-19 15:45:00





             Test Item    Value        Reference Range Interpretation Comments

 

             Culture: Aspirate/Body No Growth At 3 Days                         

  



             Fluid/Tissue (test code =                                        



             Culture: Aspirate/Body                                        



             Fluid/Tissue)                                        



[facility]annF[facility] [id number] 15:45:00





             Test Item    Value        Reference Range Interpretation Comments

 

             Fungal Smear (test No Yeast Or Fungal                           



             code = Fungal Smear) Elements Seen                           



[facility] Fungal w/Smear2022-10-19 15:45:00





             Test Item    Value        Reference Range Interpretation Comments

 

             Culture: Fungal w/Smear Culture In Progress                        

   



             (test code = Culture:                                        



             Fungal w/Smear)                                        



[facility] H[id number] 22:06:00





             Test Item    Value        Reference Range Interpretation Comments

 

             PT (test code = PT) 14.5 s       12.0-14.7                 



[facility] H[id number] 22:06:00





             Test Item    Value        Reference Range Interpretation Comments

 

             INR (test code = INR) 1.14 1       0.85-1.17                 



[facility]annF[facility] [id number] 01:05:00





             Test Item    Value        Reference Range Interpretation Comments

 

             Fungal Smear (test No Yeast Or Fungal                           



             code = Fungal Smear) Elements Seen                           



[facility] F[facility] w/Sm[facility]2022-10-17 01:05:00





             Test Item    Value        Reference Range Interpretation Comments

 

             Culture: Fungal w/Smear Culture In Progress                        

   



             (test code = Culture:                                        



             Fungal w/Smear)                                        



[facility]annG[facility] Stain [id number] 01:05:00





             Test Item    Value        Reference Range Interpretation Comments

 

             Gram Stain Report Gram Stain Performed By:                         

  



             (test code = [facility]                           



             Stain Report) [facility] Respiratory w/Gram [id number] 01:05:00





             Test Item    Value        Reference Range Interpretation Comments

 

             Culture: Respiratory Few Yeast Normal                           



             w/Gram Stain (test code Respiratory [place]                          

 



             = Culture: Respiratory Isolated                               



             w/Gram Stain)                                        



[facility]annG[facility] [id number] 01:05:00





             Test Item    Value        Reference Range Interpretation Comments

 

             Gram Stain (test Good Quality Specimen                           



             code = Gram Stain) Gram Positive Cocci Less                        

   



                          Than 25 Squamous                           



                          Epithelial Cells/Lpf                           



[facility]2022-10-16 08:58:00





             Test Item    Value        Reference Range Interpretation Comments

 

             Source Respiratory Nasophrngl Swb                           



             Panel PCR (test code = *NA*(10/16/22 3:58 AM)                      

     



             Source Respiratory                                        



             Panel PCR)                                          



[facility]2022-10-16 08:58:00





             Test Item    Value        Reference Range Interpretation Comments

 

             Influenza A PCR (test Negative *NA*(10/16/22                       

    



             code = Influenza A PCR) 3:58 AM)                               



[facility]2022-10-16 08:58:00





             Test Item    Value        Reference Range Interpretation Comments

 

             Influenza B PCR (test Negative *NA*(10/16/22                       

    



             code = Influenza B PCR) 3:58 AM)                               



[address]10-16 08:58:00





             Test Item    Value        Reference Range Interpretation Comments

 

             RSV PCR (test code = Negative *NA*(10/16/22                        

   



             RSV PCR)     3:58 AM)                               



[facility]2022-10-16 08:23:00





             Test Item    Value        Reference Range Interpretation Comments

 

             Glucose POC (test code = Glucose POC) 114          70-99           

          



[facility]2022-10-16 08:23:00





             Test Item    Value        Reference Range Interpretation Comments

 

             Gluc POC Comment 1 (test code Notified RN/MD                       

    



             = Gluc POC Comment 1)                                        



[place] [id number] 05:09:25





             Test Item    Value        Reference Range Interpretation Comments

 

             RADRPT (test code Radiation Dose CTDIVOL = 0                       

    



             = RADRPT)    (mGy): DLP = 150.01                           



                          (mGy-cm)PROCEDURE                           



                          INFORMATION: Exam: CT Chest                           



                          With Contrast; Diagnostic                           



                          Exam date and time:                           



                          10/14/2022 11:34 PM Age: 81                           



                          years old Clinical                           



                          indication: Patient HX:                           



                          /shortness of breath, right                           



                          lung mass, concern for                           



                          metastatic disease. ;                           



                          Additional info: /shortness                           



                          of breath, right lung mass,                           



                          concern for metastatic                           



                          disease. TECHNIQUE: Imaging                           



                          protocol: Diagnostic                           



                          computed tomography of the                           



                          chest with contrast.                           



                          Radiation optimization: All                           



                          CT scans at this facility                           



                          use at least one of these                           



                          dose optimization                           



                          techniques: automated                           



                          exposure control; mA and/or                           



                          kV adjustment per patient                           



                          size (includes targeted                           



                          exams where dose is matched                           



                          to clinical indication); or                           



                          iterative reconstruction.                           



                          Contrast material: OMNI;                           



                          Contrast volume: 75 ml;                           



                          Contrast route: INTRAVENOUS                           



                          (IV); COMPARISON: CHEST WO                           



                          CONTRAST CT 10/8/2022 3:48                           



                          PM RADIATION DOSE METRICS:                           



                          Total DLP (mGy-cm): 150.01                           



                          FINDINGS: Lungs: Confluent,                           



                          multifocal opacities                           



                          preferentially involving                           



                          the superior segment right                           



                          lower lobe and apical                           



                          region of the right upper                           



                          lobe. Pleural spaces:                           



                          Unremarkable. No                           



                          pneumothorax. No pleural                           



                          effusion. Heart:                           



                          Unremarkable. No                           



                          cardiomegaly. No                           



                          pericardial effusion. Lymph                           



                          nodes: No pathologic                           



                          lymphadenopathy in the                           



                          chest by CT size criteria.                           



                          Vasculature: Ectatic                           



                          ascending thoracic aorta                           



                          measuring 4.4 x 4.3 cm. No                           



                          aortic dissection.                           



                          Bones/joints: Bones are                           



                          osteopenic. No discrete                           



                          lytic or blastic osseous                           



                          lesions within the                           



                          limitations of the extent                           



                          of osteopenia. Soft                           



                          tissues: Unremarkable.                           



                          =========================[place]                           



                          OCEDURE INFORMATION: Exam:                           



                          CT Abdomen And Pelvis With                           



                          Contrast Exam date and                           



                          time: 10/14/2022 11:34 PM                           



                          Age: 81 years old Clinical                           



                          indication: Patient HX:                           



                          /shortness of breath, right                           



                          lung mass, concern for                           



                          metastatic disease. ;                           



                          Additional info: /shortness                           



                          of breath, right lung mass,                           



                          concern for metastatic                           



                          disease. TECHNIQUE: Imaging                           



                          protocol: Computed                           



                          tomography of the abdomen                           



                          and pelvis with contrast.                           



                          Radiation optimization: All                           



                          CT scans at this facility                           



                          use at least one of these                           



                          dose optimization                           



                          techniques: automated                           



                          exposure control; mA and/or                           



                          kV adjustment per patient                           



                          size (includes targeted                           



                          exams where dose is matched                           



                          to clinical indication); or                           



                          iterative reconstruction.                           



                          Contrast material: OMNI;                           



                          Contrast volume: 75 ml;                           



                          Contrast route: INTRAVENOUS                           



                          (IV); COMPARISON:                           



                          ABDOMEN/PELVIS WO IV                           



                          CONTRAST CT 3/21/2018 10:55                           



                          PM RADIATION DOSE METRICS:                           



                          Total DLP (mGy-cm): 150.01                           



                          FINDINGS: Liver: Normal. No                           



                          mass. Gallbladder and bile                           



                          ducts: Normal. No calcified                           



                          stones. No ductal dilation.                           



                          Pancreas: Normal. No ductal                           



                          dilation. Spleen: Normal.                           



                          No splenomegaly. Adrenal                           



                          glands: Normal. No mass.                           



                          Kidneys and ureters:                           



                          Normal. No hydronephrosis.                           



                          Stomach and bowel: Large                           



                          volume stool burden                           



                          throughout the colon                           



                          suggesting constipation.                           



                          Appendix: Appendix not                           



                          clearly visualized.                           



                          Intraperitoneal space:                           



                          Unremarkable. No free air.                           



                          No significant fluid                           



                          collection. Vasculature:                           



                          Mild aortoiliac calcific                           



                          atherosclerotic disease                           



                          without aneurysmal                           



                          dilatation. Lymph nodes:                           



                          Unremarkable. No enlarged                           



                          lymph nodes. Urinary                           



                          bladder: No wall                           



                          thickening. Reproductive:                           



                          Unremarkable as visualized.                           



                          Bones/joints: Bones are                           



                          osteopenic. No discrete                           



                          lytic or blastic osseous                           



                          lesions within the                           



                          limitations of the extent                           



                          of osteopenia. Multilevel                           



                          lumbar spine degenerative                           



                          change. Soft tissues: No                           



                          acute findings.                           



                          =========================IM                           



                          PRESSION: CT Chest With                           



                          Contrast; Diagnostic 1.                           



                          Multifocal confluent                           



                          opacities of the bilateral                           



                          lungs appear relatively                           



                          unchanged and are again                           



                          favored represent                           



                          pneumonia. Malignant                           



                          process cannot be excluded                           



                          and continued follow-up to                           



                          ensure complete resolution                           



                          recommended after                           



                          appropriate therapy. 2.                           



                          Ectatic ascending thoracic                           



                          aorta. CT Abdomen And                           



                          Pelvis With Contrast 1.                           



                          Large volume stool burden                           



                          throughout the colon                           



                          suggesting constipation. 2.                           



                          No findings to suggest                           



                          metastatic disease in the                           



                          abdomen or pelvis. Rico Espinoza MD On 10/15/2022                           



                          00:09:06; [id number]                           



[facility] [id number] 19:00:00





             Test Item    Value        Reference Range Interpretation Comments

 

             Total CK (test code = Total CK) 19                           

    



[id number]





             Test Item    Value        Reference Range Interpretation Comments

 

             HS Troponin I (test code = HS Troponin 8                           

           



             I)                                                  



[facility]2022-10-08 19:00:00





             Test Item    Value        Reference Range Interpretation Comments

 

             Glucose Lvl (test code = Glucose Lvl) 90           70-99           

          



[facility]2022-10-08 19:00:00





             Test Item    Value        Reference Range Interpretation Comments

 

             BUN (test code = BUN) 11           7-22                      



[facility] [id number] 19:00:00





             Test Item    Value        Reference Range Interpretation Comments

 

             Creatinine Lvl (test code = Creatinine 0.50         0.50-1.40      

           



             Lvl)                                                



[facility] [id number] 19:00:00





             Test Item    Value        Reference Range Interpretation Comments

 

             Sodium Lvl (test code = Sodium Lvl) 139          135-145           

        



[facility] [id number] 19:00:00





             Test Item    Value        Reference Range Interpretation Comments

 

             Potassium Lvl (test code = Potassium 4.2          3.5-5.1          

         



             Lvl)                                                



[facility]2022-10-08 19:00:00





             Test Item    Value        Reference Range Interpretation Comments

 

             Chloride Lvl (test code = Chloride Lvl) 102                  

            



[facility] [id number] 19:00:00





             Test Item    Value        Reference Range Interpretation Comments

 

             CO2 (test code = CO2) 29           24-32                     



[id number] 19:00:00





             Test Item    Value        Reference Range Interpretation Comments

 

             Calcium Lvl (test code = Calcium Lvl) 9.1          8.5-10.5        

          



[facility] [id number] 19:00:00





             Test Item    Value        Reference Range Interpretation Comments

 

             Total Protein (test code = Total 7.3          6.4-8.4              

     



             Protein)                                            



[facility]2022-10-08 19:00:00





             Test Item    Value        Reference Range Interpretation Comments

 

             Albumin Lvl (test code = Albumin Lvl) 2.3          3.5-5.0         

          



[facility] [id number] 19:00:00





             Test Item    Value        Reference Range Interpretation Comments

 

             ALT (test code = ALT) 20           See_Comment                [Auto

mated message] The



                                                                 system which ge

nerated this



                                                                 result transmit

nic



                                                                 reference range

: <=65. The



                                                                 reference range

 was not



                                                                 used to interpr

et this



                                                                 result as delmy

l/abnormal.



[facility] [id number] 19:00:00





             Test Item    Value        Reference Range Interpretation Comments

 

             AST (test code = AST) 14           See_Comment                [Auto

mated message] The



                                                                 system which ge

nerated this



                                                                 result transmit

nic



                                                                 reference range

: <=37. The



                                                                 reference range

 was not



                                                                 used to interpr

et this



                                                                 result as delmy

l/abnormal.



[facility] [id number] 19:00:00





             Test Item    Value        Reference Range Interpretation Comments

 

             Alk Phos (test code = Alk Phos) 176                          

    



[facility] [id number] 19:00:00





             Test Item    Value        Reference Range Interpretation Comments

 

             Bili Total (test code = Bili Total) 0.3          0.2-1.3           

        



[facility] [id number] 19:00:00





             Test Item    Value        Reference Range Interpretation Comments

 

             AGAP (test code = AGAP) 12.2         10.0-20.0                 



[facility] [id number] 19:00:00





             Test Item    Value        Reference Range Interpretation Comments

 

             B/C Ratio (test code = B/C Ratio) 22 1         6-25                

      



[facility]2022-10-08 19:00:00





             Test Item    Value        Reference Range Interpretation Comments

 

             Globulin (test code = Globulin) 5.0          2.7-4.2               

    



[facility]2022-10-08 19:00:00





             Test Item    Value        Reference Range Interpretation Comments

 

             A/G Ratio (test code = A/G Ratio) 0.5 1        0.7-1.6             

      



[facility]2022-10-08 19:00:00





             Test Item    Value        Reference Range Interpretation Comments

 

             eGFR (test code = eGFR) 93                                     



[facility]2022-10-08 19:00:00





             Test Item    Value        Reference Range Interpretation Comments

 

             WBC (test code = WBC) 8.9          3.7-10.4                  



[facility]2022-10-08 19:00:00





             Test Item    Value        Reference Range Interpretation Comments

 

             RBC (test code = RBC) 4.10         4.20-5.40                 



[facility]2022-10-08 19:00:00





             Test Item    Value        Reference Range Interpretation Comments

 

             Hgb (test code = Hgb) 10.7         12.0-16.0                 



[place] [id number] 19:00:00





             Test Item    Value        Reference Range Interpretation Comments

 

             Hct (test code = Hct) 33.3         36.0-48.0                 



[facility]2022-10-08 19:00:00





             Test Item    Value        Reference Range Interpretation Comments

 

             MCV (test code = MCV) 81.1         80.0-98.0                 



[facility]2022-10-08 19:00:00





             Test Item    Value        Reference Range Interpretation Comments

 

             MCH (test code = MCH) 26.0 pg      27.0-31.0                 



[facility]2022-10-08 19:00:00





             Test Item    Value        Reference Range Interpretation Comments

 

             MCHC (test code = MCHC) 32.1         32.0-36.0                 



[facility]2022-10-08 19:00:00





             Test Item    Value        Reference Range Interpretation Comments

 

             RDW (test code = RDW) 16.5         11.5-14.5                 



[facility]2022-10-08 19:00:00





             Test Item    Value        Reference Range Interpretation Comments

 

             Platelet (test code = Platelet) 521          133-450               

    



[facility]2022-10-08 19:00:00





             Test Item    Value        Reference Range Interpretation Comments

 

             MPV (test code = MPV) 7.0          7.4-10.4                  



[facility]2022-10-08 19:00:00





             Test Item    Value        Reference Range Interpretation Comments

 

             Segs (test code = Segs) 72.6         45.0-75.0                 



[facility]2022-10-08 19:00:00





             Test Item    Value        Reference Range Interpretation Comments

 

             Lymphocytes (test code = Lymphocytes) 14.0         20.0-40.0       

          



[facility]2022-10-08 19:00:00





             Test Item    Value        Reference Range Interpretation Comments

 

             Monocytes (test code = Monocytes) 10.4         2.0-12.0            

      



[facility]2022-10-08 19:00:00





             Test Item    Value        Reference Range Interpretation Comments

 

             Eosinophils (test code = 2.1          See_Comment                [A

utomated message] The



             Eosinophils)                                        system which ge

nerated



                                                                 this result tra

nsmitted



                                                                 reference range

: <=4.0.



                                                                 The reference r

tatianna was



                                                                 not used to int

erpret



                                                                 this result as



                                                                 normal/abnormal

.



[facility] [id number] 19:00:00





             Test Item    Value        Reference Range Interpretation Comments

 

             Basophils (test code = 0.9          See_Comment                [Aut

omated message] The



             Basophils)                                          system which ge

nerated



                                                                 this result tra

nsmitted



                                                                 reference range

: <=1.0.



                                                                 The reference r

tatianna was



                                                                 not used to int

erpret



                                                                 this result as



                                                                 normal/abnormal

.



[facility] [id number] 19:00:00





             Test Item    Value        Reference Range Interpretation Comments

 

             Neutrophils # (test code = Neutrophils 6.5          1.5-8.1        

           



             #)                                                  



[facility]2022-10-08 19:00:00





             Test Item    Value        Reference Range Interpretation Comments

 

             Lymphocytes # (test code = Lymphocytes 1.2          1.0-5.5        

           



             #)                                                  



[facility]2022-10-08 19:00:00





             Test Item    Value        Reference Range Interpretation Comments

 

             Monocytes # (test code 0.9          See_Comment                [Aut

omated message] The



             = Monocytes #)                                        system which 

generated



                                                                 this result tra

nsmitted



                                                                 reference range

: <=0.8.



                                                                 The reference r

tatianna was



                                                                 not used to int

erpret



                                                                 this result as



                                                                 normal/abnormal

.



[facility] [id number] 19:00:00





             Test Item    Value        Reference Range Interpretation Comments

 

             Eosinophils # (test code 0.2          See_Comment                [A

utomated message] The



             = Eosinophils #)                                        system whic

h generated



                                                                 this result tra

nsmitted



                                                                 reference range

: <=0.5.



                                                                 The reference r

tatianna was



                                                                 not used to int

erpret



                                                                 this result as



                                                                 normal/abnormal

.



[facility] [id number] 19:00:00





             Test Item    Value        Reference Range Interpretation Comments

 

             Basophils # (test code 0.1          See_Comment                [Aut

omated message] The



             = Basophils #)                                        system which 

generated



                                                                 this result tra

nsmitted



                                                                 reference range

: <=0.2.



                                                                 The reference r

tatianna was



                                                                 not used to int

erpret



                                                                 this result as



                                                                 normal/abnormal

.



[facility]2022-10-08 19:00:00





             Test Item    Value        Reference Range Interpretation Comments

 

             UA Color (test code = Yellow *NA*(10/8/22                          

 



             UA Color)    2:00 PM)                               



[facility]2022-10-08 19:00:00





             Test Item    Value        Reference Range Interpretation Comments

 

             UA Turbidity (test code = Clear (10/8/22 2:00                      

     



             UA Turbidity) PM)                                    



[facility]2022-10-08 19:00:00





             Test Item    Value        Reference Range Interpretation Comments

 

             UA Spec Grav (test code *NA*(10/8/22 2:00 PM)                      

     



             = UA Spec Grav)                                        



[facility]2022-10-08 19:00:00





             Test Item    Value        Reference Range Interpretation Comments

 

             UA pH (test code = UA pH) 7.0 1        5.0-8.0                   



[facility]2022-10-08 19:00:00





             Test Item    Value        Reference Range Interpretation Comments

 

             UA Protein (test code Negative (10/8/22 2:00                       

    



             = UA Protein) PM)                                    



[facility] 19:00:00





             Test Item    Value        Reference Range Interpretation Comments

 

             UA Glucose (test code Negative (10/8/22 2:00                       

    



             = UA Glucose) PM)                                    



[facility]2022-10-08 19:00:00





             Test Item    Value        Reference Range Interpretation Comments

 

             UA Ketones (test code Negative *NA*(10/8/22                        

   



             = UA Ketones) 2:00 PM)                               



[facility]2022-10-08 19:00:00





             Test Item    Value        Reference Range Interpretation Comments

 

             UA Bili (test code = Negative *NA*(10/8/22                         

  



             UA Bili)     2:00 PM)                               



[facility] AND STOOL2022-10-08 19:00:00





             Test Item    Value        Reference Range Interpretation Comments

 

             UA Blood (test code = Trace *ABN*(10/8/22                          

 



             UA Blood)    2:00 PM)                               



[facility] AND STOOL2022-10-08 19:00:00





             Test Item    Value        Reference Range Interpretation Comments

 

             UA Urobilinogen (test code = UA 0.2          0.1-1.0               

    



             Urobilinogen)                                        



[facility] AND STOOL2022-10-08 19:00:00





             Test Item    Value        Reference Range Interpretation Comments

 

             UA Nitrite (test code Negative (10/8/22 2:00                       

    



             = UA Nitrite) PM)                                    



[facility] AND [facility]2022-10-08 19:00:00





             Test Item    Value        Reference Range Interpretation Comments

 

             UA Leuk Est (test code Trace *ABN*(10/8/22                         

  



             = UA Leuk Est) 2:00 PM)                               



[facility] AND [facility]2022-10-08 19:00:00





             Test Item    Value        Reference Range Interpretation Comments

 

             UA Sq Epi (test code = UA Sq Occasional /LPF                       

    



             Epi)                                                



[facility] AND [facility]2022-10-08 19:00:00





             Test Item    Value        Reference Range Interpretation Comments

 

             UA WBC (test code = 1            See_Comment                [Automa

nic message] The



             UA WBC)                                             system which ge

nerated this



                                                                 result transmit

nic



                                                                 reference range

: <=5. The



                                                                 reference range

 was not



                                                                 used to interpr

et this



                                                                 result as delmy

l/abnormal.



[facility] AND [facility]2022-10-08 19:00:00





             Test Item    Value        Reference Range Interpretation Comments

 

             UA RBC (test code = 1            See_Comment                [Automa

nic message] The



             UA RBC)                                             system which ge

nerated this



                                                                 result transmit

nic



                                                                 reference range

: <=2. The



                                                                 reference range

 was not



                                                                 used to interpr

et this



                                                                 result as delmy

l/abnormal.



[facility] AND [facility]2022-10-08 19:00:00





             Test Item    Value        Reference Range Interpretation Comments

 

             UA Trans Epi (test code = UA Trans Epi) 2                          

            



[facility] METABOLIC PANEL (NA, K, CL, CO2, GLUCOSE, BUN, CREATININE,
CA)2022 10:53:23





             Test Item    Value        Reference Range Interpretation Comments

 

             NA (test code = 138 mmol/L   135-145                   



             1967289785)                                         

 

             K (test code = 3.9 mmol/L   3.5-5.0                   Slight



             2087865484)                                         hemolysis

 

             CL (test code = 107 mmol/L                       



             2455040717)                                         

 

             CO2 TOTAL (test code 26 mmol/L    23-31                     



             = 5282167151)                                        

 

             AGAP (test code =              2-16                      



             9474847249)                                         

 

             BUN (test code = 15 mg/dL     7-23                      Slight



             6556223035)                                         hemolysis

 

             GLUCOSE (test code = 86 mg/dL                         



             7379994328)                                         

 

             CREATININE (test code 0.44 mg/dL   0.50-1.04    L            



             = 2822998112)                                        

 

             CALCIUM (test code = 8.6 mg/dL    8.6-10.6                  



             8534952887)                                         

 

             eGFR (test code =              mL/min/1.73m2              



             4046563931)                                         

 

             IAN (test code = IAN) Association of                           



                          Glomerular                             



                          Filtration Rate                           



                          (GFR) and Staging                           



                          of Kidney Disease*                           



                          +------------------                           



                          -----+-------------                           



                          --------+----------                           



                          ---------------+|                           



                          GFR (mL/min/1.73                           



                          m2) ?| With Kidney                           



                          Damage ?| ?Without                           



                          Kidney                                 



                          Damage+------------                           



                          -----------+-------                           



                          --------------+----                           



                          -------------------                           



                          --+| ?>90 ? ? ? ? ?                           



                          ? ? ? ?| ?Stage one                           



                          ? ? ? ? ?| ? Normal                           



                          ? ? ? ? ? ? ?                           



                          ?+-----------------                           



                          ------+------------                           



                          ---------+---------                           



                          ----------------+|                           



                          ?60-89 ? ? ? ? ? ?                           



                          ? ?| ?Stage two ? ?                           



                          ? ? ?| ? Decreased                           



                          GFR ? ? ? ?                            



                          +------------------                           



                          -----+-------------                           



                          --------+----------                           



                          ---------------+|                           



                          ?30-59 ? ? ? ? ? ?                           



                          ? ?| ?Stage three ?                           



                          ? ? ?| ? Stage                           



                          three ? ? ? ? ?                           



                          +------------------                           



                          -----+-------------                           



                          --------+----------                           



                          ---------------+|                           



                          ?15-29 ? ? ? ? ? ?                           



                          ? ?| ?Stage four ?                           



                          ? ? ? | ? Stage                           



                          four ? ? ? ? ?                           



                          ?+-----------------                           



                          ------+------------                           



                          ---------+---------                           



                          ----------------+|                           



                          ?<15 (or dialysis)                           



                          ? ?| ?Stage five ?                           



                          ? ? ? | ? Stage                           



                          five ? ? ? ? ?                           



                          ?+-----------------                           



                          ------+------------                           



                          ---------+---------                           



                          ----------------+                           



                          *Each stage assumes                           



                          the associated GFR                           



                          level has been in                           



                          effect for at least                           



                          three months.                           



                          ?Stages 1 to 5,                           



                          with or without                           



                          kidney disease,                           



                          indicate chronic                           



                          kidney disease.                           



                          Notes:                                 



                          Determination of                           



                          stages one and two                           



                          (with eGFR                             



                          >59mL/min/1.73 m2)                           



                          requires estimation                           



                          of kidney damage                           



                          for at least three                           



                          months as defined                           



                          by structural or                           



                          functional                             



                          abnormalities of                           



                          the kidney,                            



                          manifested by                           



                          either:Pathological                           



                          abnormalities or                           



                          Markers of kidney                           



                          damage (including                           



                          abnormalities in                           



                          the composition of                           



                          the blood or urine                           



                          or abnormalities in                           



                          imaging tests).                           

 

             Lab Interpretation Abnormal                               



             (test code = 81967-2)                                        



[facility]





             Test Item    Value        Reference Range Interpretation Comments

 

             MAGNESIUM (test code = 7225572859) 1.8 mg/dL    1.7-2.4            

       

 

             Lab Interpretation (test code = Normal                             

    



             39089-7)                                            



[facility]
06:09:23





             Test Item    Value        Reference Range Interpretation Comments

 

             APTT Patient (test code              See_Comment  H             [Au

tomated message]



             = 3173-2)                                           The system [employer]



                                                                 generated this 

result



                                                                 transmitted ref

erence



                                                                 range: 26 - 36



                                                                 Seconds. The



                                                                 reference range

 was



                                                                 not used to int

erpret



                                                                 this result as



                                                                 normal/abnormal

.

 

             Lab Interpretation (test Abnormal                               



             code = 00565-9)                                        



[facility] 23:46:22





             Test Item    Value        Reference Range Interpretation Comments

 

             Height (test code =              in                        



             8126753091)                                         

 

             Weight (test code =              lbs                       



             5626084796)                                         

 

             Systolic BP (test code =              mmHg                      



             8570830178)                                         

 

             Diastolic BP (test code              mmHg                      



             = 5143022975)                                        

 

             LVOT stroke volume (test 45.80 cm3                              



             code = 6728848745)                                        

 

             EF (HM) (test code = 57.00 %                                



             7231146563)                                         

 

             EF(Teich) (test code = 48.60 %                                



             6701571436)                                         

 

             LVIDD (test code = 3.20 cm                                



             7059826797)                                         

 

             LVIDS (test code = 2.41 cm                                



             7853346302)                                         

 

             IVS (test code = 1.30 cm                                



             1645603355)                                         

 

             LVPWD (test code = 1.39 cm                                



             5641436394)                                         

 

             LVOT diameter (test code 1.81 cm                                



             = 1338425162)                                        

 

             FS (test code = 24 %                                   



             0174428924)                                         

 

             MV Peak E Jt (test code 76.7 cm/s                              



             = 3618312022)                                        

 

             MV Peak A Jt (test code 95.5 cm/s                              



             = 8622830429)                                        

 

             E/A ratio (test code =              ratio                     



             3830911533)                                         

 

             E wave decelartion time 0.17 s                                 



             (test code = 7443184471)                                        

 

             MV E/e' septal (test 5.4 cm/s                               



             code = 0309522229)                                        

 

             LA Volume Index (BP) 34.4 mL/m2                             



             (test code = 4204914203)                                        

 

             LA volume (BP) (test 48.5 mL                                



             code = 0588833683)                                        

 

             LVOT peak jt (test code 81.4 cm/s                              



             = 6099428612)                                        

 

             LVOT mn grad (test code              mmHg                      



             = 9772984029)                                        

 

             Left Ventricular Cardiac 3.4 L/min                              



             Output (test code =                                        



             5089209)                                            

 

             ED Current (HM) (test 60.00 %                                



             code = 3364588006)                                        

 

             ED Default (HM) (test 60.00 %                                



             code = 9794094992)                                        

 

             SV (HM) (test code = 47.00 mL                               



             1070685042)                                         

 

             BSA (test code = 1.41 m2                                



             6027167248)                                         

 

             LA size (test code = 4.2 cm                                 



             0706096996)                                         

 

             LAV(MOD-sp2) (test code 68.10 mL                               



             = 2394374116)                                        

 

             LAV(MOD-sp4) (test code 34.80 mL                               



             = 9637522806)                                        

 

             Tapse (test code = 2.15 cm                                



             1780930407)                                         

 

             AV LVOT peak gradient              mmHg                      



             (test code = 0894765354)                                        

 

             LVOT peak VTI (test code 17.9 cm                                



             = 9706969498)                                        

 

             Aortic HR (test code =              BPM                       



             6809014332)                                         

 

             LV V1 mean (test code = 54.50 cm/s                             



             8595770258)                                         

 

             MV Prop V (test code = 43.60 cm/s                             



             1177413867)                                         

 

             TR Peak Jt (test code = 230.1 cm/s                             



             7055175062)                                         

 

             Triscuspid Valve              mmHg                      



             Regurgitation Peak                                        



             Gradient (test code =                                        



             8439445551)                                         

 

             Ao root annulus (test 3.3 cm                                 



             code = 1404915781)                                        

 

             Ao root diam (test code 3.30 cm                                



             = 3956430897)                                        

 

             Aortic root (test code = 3.3 cm                                 



             0931830931)                                         

 

             PW (test code = 1.39 cm      0.6-1.1                   



             5213220513)                                         

 

             EF - 2D (test code = 48.60 %                                



             35803795)                                           

 

             Interventricular Septum 1.30 cm                                



             Diastolic Thickness by                                        



             2D (test code = 8230245)                                        

 

             Aortic valve mean 58.9 cm/s                              



             velocity (test code =                                        



             1290461390)                                         

 

             Ao peak jt (test code = 85.5 cm/s                              



             7919726606)                                         

 

             Ao VTI (test code = 18.7 cm                                



             6208153071)                                         

 

             AV area by cont VTI 2.5 cm2                                



             (test code = 6741746791)                                        

 

             AV area peak jt (test 2.4 cm2                                



             code = 2895477423)                                        

 

             Ao max PG (test code = 2.90 mm[Hg]                            



             1711093234)                                         

 

             AV peak gradient (test              mmHg                      



             code = 2042415095)                                        

 

             AV valve area (test code 2.46 cm2                               



             = 2392994516)                                        

 

             AV mean gradient (test              mmHg                      



             code = 3415171659)                                        

 

             Radiology Study                                        



             observation (narrative)                                        



             (test code = 18782-3)                                        

 

                          IAN (test code = IAN)     

                                                           



                           ?Left?Ventricle:                           



                          Left ventricle is                           



                          normal in size and                           



                          function. There is                           



                          mild concentric                           



                          hypertrophy with RWT                           



                          0.79, LV mass 142 g,                           



                          LV mass index 101                           



                          g/m2. Normal systolic                           



                          function with a                           



                          visually estimated EF                           



                          of 55 - 60%. There is                           



                          pseudonormal                           



                          diastolic                              



                                                    dysfunction.

                                                           



                           ?Tricuspid?Valve:                           



                          Mild to moderate                           



                          transvalvular                           



                          regurgitation. Right                           



                          ventricular systolic                           



                          pressure is 20-25                           



                                                    mmHg.

                                                           



                           ?Right?Ventricle:                           



                          Right ventricle is                           



                          normal in size and                           



                          function. Normal wall                           



                                                    thickness.

                                                           



                           ?IVC/SVC: IVC                           



                          diameter is less than                           



                          or equal to 21 mm and                           



                          decreases less than                           



                          50% during                             



                          inspiration;                           



                          therefore the                           



                          estimated right                           



                          atrial pressure is                           



                          intermediate (~8                           



                          mmHg). ?IVC normal in                           



                          size and respiratory                           



                                                    variation.

                                                           



                           ?Aortic?Valve:                           



                          Severely thickened                           



                          cusps. Severely                           



                          calcified cusps.                           



                          Complete                               



                          interrogation of                           



                          aortic valve was not                           



                          performed. With the                           



                          limited                                



                          interrogation, the                           



                          valve appears to open                           



                          and stenosis does not                           



                                                    appear to be severe.

                                                           



                           ?Mitral?Valve:                           



                          Moderately thickened                           



                          leaflets. Mildly                           



                          calcified leaflets.                           



                          Moderate mitral                           



                          annular                                



                          calcification. No                           



                          significant stenosis.                           



                          Tammy Dawson MD                                     



[facility]





             Test Item    Value        Reference    Interpretation Comments



                                       Range                     

 

             TROPONIN I (test 0.142 ng/mL  See_Comment  H             [Automated



             code = 8578911259)                                        message] 

The



                                                                 system which



                                                                 generated this



                                                                 result



                                                                 transmitted



                                                                 reference range

:



                                                                 <=0.034. The



                                                                 reference range



                                                                 was not used to



                                                                 interpret this



                                                                 result as



                                                                 normal/abnormal

.

 

             IAN (test code = Reference (Normal)                           



             IAN)         Range (defined by                           



                          the 99th percentile                           



                          reference limit): <=                           



                          0.034 ng/mL Note:                           



                          Cardiac troponin                           



                          begins to rise 3-4                           



                          hours after the                           



                          onset of ischemia.                           



                          Repeat in 4-6 hours                           



                          if the sample was                           



                          drawn within 3-4                           



                          hours of the onset                           



                          of the symptom and                           



                          found normal.                           



                          Diagnosis of                           



                          myocardial injury is                           



                          made with acute                           



                          changes in cTn                           



                          concentrations with                           



                          at least one serial                           



                          sample above the                           



                          99th percentile                           



                          upper reference                           



                          limit (URL), taken                           



                          together with the                           



                          patient's clinical                           



                          presentation. Biotin                           



                          has been reported to                           



                          cause a negative                           



                          bias, interpret                           



                          results relative to                           



                          patient's use of                           



                          biotin.                                

 

             Lab Interpretation Abnormal                               



             (test code =                                        



             22880-5)                                            



[facility]





             Test Item    Value        Reference Range Interpretation Comments

 

             APTT Patient (test code =              See_Comment                [

Automated message]



             3173-2)                                             The system [employer]



                                                                 generated this 

result



                                                                 transmitted ref

erence



                                                                 range: 26 - 36



                                                                 Seconds. The re

ference



                                                                 range was not u

sed to



                                                                 interpret this 

result



                                                                 as normal/abnor

mal.

 

             Lab Interpretation (test Normal                                 



             code = 84492-3)                                        



[facility]





             Test Item    Value        Reference    Interpretation Comments



                                       Range                     

 

             TROPONIN I (test 0.334 ng/mL  See_Comment  H             [Automated



             code = 8462918667)                                        message] 

The



                                                                 system which



                                                                 generated this



                                                                 result



                                                                 transmitted



                                                                 reference range

:



                                                                 <=0.034. The



                                                                 reference range



                                                                 was not used to



                                                                 interpret this



                                                                 result as



                                                                 normal/abnormal

.

 

             IAN (test code = Reference (Normal)                           



             IAN)         Range (defined by                           



                          the 99th percentile                           



                          reference limit): <=                           



                          0.034 ng/mL Note:                           



                          Cardiac troponin                           



                          begins to rise 3-4                           



                          hours after the                           



                          onset of ischemia.                           



                          Repeat in 4-6 hours                           



                          if the sample was                           



                          drawn within 3-4                           



                          hours of the onset                           



                          of the symptom and                           



                          found normal.                           



                          Diagnosis of                           



                          myocardial injury is                           



                          made with acute                           



                          changes in cTn                           



                          concentrations with                           



                          at least one serial                           



                          sample above the                           



                          99th percentile                           



                          upper reference                           



                          limit (URL), taken                           



                          together with the                           



                          patient's clinical                           



                          presentation. Biotin                           



                          has been reported to                           



                          cause a negative                           



                          bias, interpret                           



                          results relative to                           



                          patient's use of                           



                          biotin.                                

 

             Lab Interpretation Abnormal                               



             (test code =                                        



             41641-8)                                            



[facility] 
12:22:37





             Test Item    Value        Reference Range Interpretation Comments

 

             HGB A1C (test code = 5.7 %        4.0-5.7                   



             4548-4)                                             

 

             IAN (test code = IAN) Reference                              



                          RangesNormal:                           



                          <5.7%Prediabetes:                           



                          5.7 - 6.4%Diabetes:                           



                          > 6.5%                                 

 

             Lab Interpretation (test Normal                                 



             code = 03368-0)                                        



[facility]TOTAL CHOLESTEROL, 
TRIGLYCERIDES, HDL)2022-06-10 12:05:05





             Test Item    Value        Reference Range Interpretation Comments

 

             CHOL (test code = 138 mg/dL    120-200                   



             [id number])                                         

 

             HDL (test code = 60 mg/dL     >50                       



             3823575366)                                         

 

             HDLC RATIO (test code =              See_Comment                [Au

tomated message]



             [id number])                                         The system [employer]



                                                                 generated this



                                                                 result transmit

nic



                                                                 reference range

:



                                                                 <=4.5. The refe

rence



                                                                 range was not u

sed



                                                                 to interpret th

is



                                                                 result as



                                                                 normal/abnormal

.

 

             TRIG (test code = 41 mg/dL                         



             3521267672)                                         

 

             LDL CHOL (test code = 70 mg/dL     See_Comment                [Auto

mated message]



             36886-0)                                            The system [employer]



                                                                 generated this



                                                                 result transmit

nic



                                                                 reference range

:



                                                                 <=160. The refe

rence



                                                                 range was not u

sed



                                                                 to interpret th

is



                                                                 result as



                                                                 normal/abnormal

.

 

             VLDL (test code = 8 mg/dL      5-60                      



             6869973435)                                         

 

             Lab Interpretation (test Normal                                 



             code = 00271-2)                                        



[facility] METABOLIC PANEL (NA, K, CL, CO2, 
GLUCOSE, BUN, CREATININE, CA)2022-06-10 10:13:58





             Test Item    Value        Reference Range Interpretation Comments

 

             NA (test code = 141 mmol/L   135-145                   



             [id number])                                         

 

             K (test code = 3.3 mmol/L   3.5-5.0      L            



             2039774841)                                         

 

             CL (test code = 105 mmol/L                       



             0628012725)                                         

 

             CO2 TOTAL (test code = 33 mmol/L    23-31        H            



             7357415096)                                         

 

             AGAP (test code =              2-16                      



             2857559566)                                         

 

             BUN (test code = 20 mg/dL     7-23                      



             3866298151)                                         

 

             GLUCOSE (test code = 86 mg/dL                         



             9662593569)                                         

 

             CREATININE (test code = 0.66 mg/dL   0.50-1.04                 



             4086771075)                                         

 

             CALCIUM (test code = 8.5 mg/dL    8.6-10.6     L            



             8641276718)                                         

 

             eGFR (test code =              mL/min/1.73m2              



             4389302447)                                         

 

             AIN (test code = IAN) Association of                           



                          Glomerular Filtration                           



                          Rate (GFR) and Staging                           



                          of Kidney Disease*                           



                          +---------------------                           



                          --+-------------------                           



                          --+-------------------                           



                          ------+| GFR                           



                          (mL/min/1.73 m2) ?|                           



                          With Kidney Damage ?|                           



                          ?Without Kidney                           



                          Damage+---------------                           



                          --------+-------------                           



                          --------+-------------                           



                          ------------+| ?>90 ?                           



                          ? ? ? ? ? ? ? ?|                           



                          ?Stage one ? ? ? ? ?|                           



                          ? Normal ? ? ? ? ? ? ?                           



                          ?+--------------------                           



                          ---+------------------                           



                          ---+------------------                           



                          -------+| ?60-89 ? ? ?                           



                          ? ? ? ? ?| ?Stage two                           



                          ? ? ? ? ?| ? Decreased                           



                          GFR ? ? ? ?                            



                          +---------------------                           



                          --+-------------------                           



                          --+-------------------                           



                          ------+| ?30-59 ? ? ?                           



                          ? ? ? ? ?| ?Stage                           



                          three ? ? ? ?| ? Stage                           



                          three ? ? ? ? ?                           



                          +---------------------                           



                          --+-------------------                           



                          --+-------------------                           



                          ------+| ?15-29 ? ? ?                           



                          ? ? ? ? ?| ?Stage four                           



                          ? ? ? ? | ? Stage four                           



                          ? ? ? ? ?                              



                          ?+--------------------                           



                          ---+------------------                           



                          ---+------------------                           



                          -------+| ?<15 (or                           



                          dialysis) ? ?| ?Stage                           



                          five ? ? ? ? | ? Stage                           



                          five ? ? ? ? ?                           



                          ?+--------------------                           



                          ---+------------------                           



                          ---+------------------                           



                          -------+ *Each stage                           



                          assumes the associated                           



                          GFR level has been in                           



                          effect for at least                           



                          three months. ?Stages                           



                          1 to 5, with or                           



                          without kidney                           



                          disease, indicate                           



                          chronic kidney                           



                          disease. Notes:                           



                          Determination of                           



                          stages one and two                           



                          (with eGFR                             



                          >59mL/min/1.73 m2)                           



                          requires estimation of                           



                          kidney damage for at                           



                          least three months as                           



                          defined by structural                           



                          or functional                           



                          abnormalities of the                           



                          kidney, manifested by                           



                          either:Pathological                           



                          abnormalities or                           



                          Markers of kidney                           



                          damage (including                           



                          abnormalities in the                           



                          composition of the                           



                          blood or urine or                           



                          abnormalities in                           



                          imaging tests).                           

 

             Lab Interpretation Abnormal                               



             (test code = 92005-1)                                        



[facility] 10:13:58





             Test Item    Value        Reference Range Interpretation Comments

 

             MAGNESIUM (test code = 7302348103) 2.1 mg/dL    1.7-2.4            

       

 

             Lab Interpretation (test code = Normal                             

    



             70929-8)                                            



[facility]34





             Test Item    Value        Reference Range Interpretation Comments

 

             PROTIME PATIENT (test              See_Comment                [Auto

mated message]



             code = 5964-2)                                        The system [employer]



                                                                 generated this 

result



                                                                 transmitted ref

erence



                                                                 range: 10.1 - 1

2.6



                                                                 Seconds. The re

ference



                                                                 range was not u

sed to



                                                                 interpret this 

result



                                                                 as normal/abnor

mal.

 

             INR (test code = 6301-6)                                        Nor

mal INR <1.1;



                                                                 Warfarin Therap

eutic



                                                                 range 2.0 to 3.

0 or



                                                                 2.5 to 3.5, dep

ending



                                                                 upon the indica

tions.

 

             Lab Interpretation (test Normal                                 



             code = 31129-0)                                        



[facility]





             Test Item    Value        Reference Range Interpretation Comments

 

             APTT Patient (test code =              See_Comment                [

Automated message]



             3173-2)                                             The system whic

h



                                                                 generated this 

result



                                                                 transmitted ref

erence



                                                                 range: 26 - 36



                                                                 Seconds. The re

ference



                                                                 range was not u

sed to



                                                                 interpret this 

result



                                                                 as normal/abnor

mal.

 

             Lab Interpretation (test Normal                                 



             code = 06977-8)                                        



[facility] WITH DIFF2022-06-10 09:24:32





             Test Item    Value        Reference Range Interpretation Comments

 

             WBC (test code =              See_Comment                [Automated



             8290-2)                                             message] The sy

stem



                                                                 which generated



                                                                 this result



                                                                 transmitted



                                                                 reference range

:



                                                                 4.30 - 11.10



                                                                 10*3/?L. The



                                                                 reference range

 was



                                                                 not used to



                                                                 interpret this



                                                                 result as



                                                                 normal/abnormal

.

 

             RBC (test code =              See_Comment                [Automated



             789-8)                                              message] The sy

stem



                                                                 which generated



                                                                 this result



                                                                 transmitted



                                                                 reference range

:



                                                                 3.93 - 5.25



                                                                 10*6/?L. The



                                                                 reference range

 was



                                                                 not used to



                                                                 interpret this



                                                                 result as



                                                                 normal/abnormal

.

 

             HGB (test code = 11.6 g/dL    11.6-15.0                 



             718-7)                                              

 

             HCT (test code = 35.6 %       35.7-45.2    L            



             4544-3)                                             

 

             MCV (test code = 84.8 fL      80.6-95.5                 



             787-2)                                              

 

             MCH (test code = 27.6 pg      25.9-32.8                 



             785-6)                                              

 

             MCHC (test code = 32.6 g/dL    31.6-35.1                 



             786-4)                                              

 

             RDW-SD (test code = 52.8 fL      39.0-49.9    H            



             88662-5)                                            

 

             RDW-CV (test code = 17.2 %       12.0-15.5    H            



             788-0)                                              

 

             PLT (test code =              See_Comment                [Automated



             777-3)                                              message] The sy

stem



                                                                 which generated



                                                                 this result



                                                                 transmitted



                                                                 reference range

:



                                                                 166 - 358 10*3/

?L.



                                                                 The reference r

tatianna



                                                                 was not used to



                                                                 interpret this



                                                                 result as



                                                                 normal/abnormal

.

 

             MPV (test code = 10.1 fL      9.5-12.9                  



             72331-8)                                            

 

             NRBC/100 WBC (test              See_Comment                [Automat

ed



             code = 4260865647)                                        message] 

The system



                                                                 which generated



                                                                 this result



                                                                 transmitted



                                                                 reference range

:



                                                                 0.0 - 10.0 /100



                                                                 WBCs. The refer

ence



                                                                 range was not u

sed



                                                                 to interpret th

is



                                                                 result as



                                                                 normal/abnormal

.

 

             NRBC x10^3 (test code <0.01        See_Comment                [Auto

mated



             = 7303609835)                                        message] The s

ystem



                                                                 which generated



                                                                 this result



                                                                 transmitted



                                                                 reference range

:



                                                                 10*3/?L. The



                                                                 reference range

 was



                                                                 not used to



                                                                 interpret this



                                                                 result as



                                                                 normal/abnormal

.

 

             GRAN MAT (NEUT) % 66.6 %                                 



             (test code = 770-8)                                        

 

             IMM GRAN % (test code 0.20 %                                 



             = 0946570437)                                        

 

             LYMPH % (test code = 21.4 %                                 



             736-9)                                              

 

             MONO % (test code = 10.3 %                                 



             5905-5)                                             

 

             EOS % (test code = 1.1 %                                  



             713-8)                                              

 

             BASO % (test code = 0.4 %                                  



             706-2)                                              

 

             GRAN MAT x10^3(ANC) 5.71 10*3/uL 1.88-7.09                 



             (test code =                                        



             5503630234)                                         

 

             IMM GRAN x10^3 (test <0.03        0.00-0.06                 



             code = 5290979869)                                        

 

             LYMPH x10^3 (test code 1.83 10*3/uL 1.32-3.29                 



             = 731-0)                                            

 

             MONO x10^3 (test code 0.88 10*3/uL 0.33-0.92                 



             = 742-7)                                            

 

             EOS x10^3 (test code = 0.09 10*3/uL 0.03-0.39                 



             711-2)                                              

 

             BASO x10^3 (test code 0.03 10*3/uL 0.01-0.07                 



             = 704-7)                                            

 

             Lab Interpretation Abnormal                               



             (test code = 67137-0)                                        



[facility] -06-10 01:34:30





             Test Item    Value        Reference    Interpretation Comments



                                       Range                     

 

             TROPONIN I (test 0.005 ng/mL  See_Comment                [Automated



             code = 1234784794)                                        message] 

The



                                                                 system which



                                                                 generated this



                                                                 result



                                                                 transmitted



                                                                 reference range

:



                                                                 <=0.034. The



                                                                 reference range



                                                                 was not used to



                                                                 interpret this



                                                                 result as



                                                                 normal/abnormal

.

 

             IAN (test code = Reference (Normal)                           



             IAN)         Range (defined by                           



                          the 99th percentile                           



                          reference limit): <=                           



                          0.034 ng/mL Note:                           



                          Cardiac troponin                           



                          begins to rise 3-4                           



                          hours after the                           



                          onset of ischemia.                           



                          Repeat in 4-6 hours                           



                          if the sample was                           



                          drawn within 3-4                           



                          hours of the onset                           



                          of the symptom and                           



                          found normal.                           



                          Diagnosis of                           



                          myocardial injury is                           



                          made with acute                           



                          changes in cTn                           



                          concentrations with                           



                          at least one serial                           



                          sample above the                           



                          99th percentile                           



                          upper reference                           



                          limit (URL), taken                           



                          together with the                           



                          patient's clinical                           



                          presentation. Biotin                           



                          has been reported to                           



                          cause a negative                           



                          bias, interpret                           



                          results relative to                           



                          patient's use of                           



                          biotin.                                

 

             Lab Interpretation Normal                                 



             (test code =                                        



             73754-4)                                            



[id number]28





             Test Item    Value        Reference Range Interpretation Comments

 

             NT-proBNP (test code 496 pg/mL    See_Comment  H             [Autom

ated



             = 9479723289)                                        message] The



                                                                 system which



                                                                 generated this



                                                                 result



                                                                 transmitted



                                                                 reference range

:



                                                                 <=450. The



                                                                 reference range



                                                                 was not used to



                                                                 interpret this



                                                                 result as



                                                                 normal/abnormal

.

 

             IAN (test code = IAN) Biotin has been                           



                          reported to                            



                          cause a negative                           



                          bias, interpret                           



                          results relative                           



                          to patient's use                           



                          of biotin.                             

 

             Lab Interpretation Abnormal                               



             (test code = 90861-5)                                        



[facility]93730[facility] 
01:22:49





             Test Item    Value        Reference Range Interpretation Comments

 

             NA (test code = 137 mmol/L   135-145                   



             3008309477)                                         

 

             K (test code = 4.2 mmol/L   3.5-5.0                   



             3567626064)                                         

 

             CL (test code = 101 mmol/L                       



             9194338822)                                         

 

             CO2 TOTAL (test code = 28 mmol/L    23-31                     



             6574551848)                                         

 

             AGAP (test code =              2-16                      



             0744929006)                                         

 

             BUN (test code = 26 mg/dL     7-23         H            



             0079427921)                                         

 

             GLUCOSE (test code = 187 mg/dL           H            



             0625711646)                                         

 

             CREATININE (test code = 0.61 mg/dL   0.50-1.04                 



             8530038355)                                         

 

             TOTAL BILI (test code = 0.5 mg/dL    0.1-1.1                   



             9702189575)                                         

 

             CALCIUM (test code = 8.9 mg/dL    8.6-10.6                  



             0379174090)                                         

 

             T PROTEIN (test code = 6.8 g/dL     6.3-8.2                   



             1411543781)                                         

 

             ALBUMIN (test code = 3.9 g/dL     3.5-5.0                   



             4323625435)                                         

 

             ALK PHOS (test code = 46 U/L                           



             5801757977)                                         

 

             ALTv (test code = 23 U/L       5-35                      



             2-6)                                             

 

             AST(SGOT) (test code = 38 U/L       13-40                     



             4250579481)                                         

 

             eGFR (test code =              mL/min/1.73m2              



             7378912099)                                         

 

             IAN (test code = IAN) Association of                           



                          Glomerular Filtration                           



                          Rate (GFR) and Staging                           



                          of Kidney Disease*                           



                          +---------------------                           



                          --+-------------------                           



                          --+-------------------                           



                          ------+| GFR                           



                          (mL/min/1.73 m2) ?|                           



                          With Kidney Damage ?|                           



                          ?Without Kidney                           



                          Damage+---------------                           



                          --------+-------------                           



                          --------+-------------                           



                          ------------+| ?>90 ?                           



                          ? ? ? ? ? ? ? ?|                           



                          ?Stage one ? ? ? ? ?|                           



                          ? Normal ? ? ? ? ? ? ?                           



                          ?+--------------------                           



                          ---+------------------                           



                          ---+------------------                           



                          -------+| ?60-89 ? ? ?                           



                          ? ? ? ? ?| ?Stage two                           



                          ? ? ? ? ?| ? Decreased                           



                          GFR ? ? ? ?                            



                          +---------------------                           



                          --+-------------------                           



                          --+-------------------                           



                          ------+| ?30-59 ? ? ?                           



                          ? ? ? ? ?| ?Stage                           



                          three ? ? ? ?| ? Stage                           



                          three ? ? ? ? ?                           



                          +---------------------                           



                          --+-------------------                           



                          --+-------------------                           



                          ------+| ?15-29 ? ? ?                           



                          ? ? ? ? ?| ?Stage four                           



                          ? ? ? ? | ? Stage four                           



                          ? ? ? ? ?                              



                          ?+--------------------                           



                          ---+------------------                           



                          ---+------------------                           



                          -------+| ?<15 (or                           



                          dialysis) ? ?| ?Stage                           



                          five ? ? ? ? | ? Stage                           



                          five ? ? ? ? ?                           



                          ?+--------------------                           



                          ---+------------------                           



                          ---+------------------                           



                          -------+ *Each stage                           



                          assumes the associated                           



                          GFR level has been in                           



                          effect for at least                           



                          three months. ?Stages                           



                          1 to 5, with or                           



                          without kidney                           



                          disease, indicate                           



                          chronic kidney                           



                          disease. Notes:                           



                          Determination of                           



                          stages one and two                           



                          (with eGFR                             



                          >59mL/min/1.73 m2)                           



                          requires estimation of                           



                          kidney damage for at                           



                          least three months as                           



                          defined by structural                           



                          or functional                           



                          abnormalities of the                           



                          kidney, manifested by                           



                          either:Pathological                           



                          abnormalities or                           



                          Markers of kidney                           



                          damage (including                           



                          abnormalities in the                           



                          composition of the                           



                          blood or urine or                           



                          abnormalities in                           



                          imaging tests).                           

 

             Lab Interpretation Abnormal                               



             (test code = 38324-9)                                        



[facility] WITH [id number] 01:10:25





             Test Item    Value        Reference Range Interpretation Comments

 

             WBC (test code =              See_Comment                [Automated



             6690-2)                                             message] The sy

stem



                                                                 which generated



                                                                 this result



                                                                 transmitted



                                                                 reference range

:



                                                                 4.30 - 11.10



                                                                 10*3/?L. The



                                                                 reference range

 was



                                                                 not used to



                                                                 interpret this



                                                                 result as



                                                                 normal/abnormal

.

 

             RBC (test code =              See_Comment                [Automated



             789-8)                                              message] The sy

stem



                                                                 which generated



                                                                 this result



                                                                 transmitted



                                                                 reference range

:



                                                                 3.93 - 5.25



                                                                 10*6/?L. The



                                                                 reference range

 was



                                                                 not used to



                                                                 interpret this



                                                                 result as



                                                                 normal/abnormal

.

 

             HGB (test code = 12.7 g/dL    11.6-15.0                 



             718-7)                                              

 

             HCT (test code = 39.4 %       35.7-45.2                 



             4544-3)                                             

 

             MCV (test code = 83.7 fL      80.6-95.5                 



             787-2)                                              

 

             MCH (test code = 27.0 pg      25.9-32.8                 



             785-6)                                              

 

             MCHC (test code = 32.2 g/dL    31.6-35.1                 



             786-4)                                              

 

             RDW-SD (test code = 51.8 fL      39.0-49.9    H            



             19264-7)                                            

 

             RDW-CV (test code = 17.0 %       12.0-15.5    H            



             788-0)                                              

 

             PLT (test code =              See_Comment                [Automated



             777-3)                                              message] The sy

stem



                                                                 which generated



                                                                 this result



                                                                 transmitted



                                                                 reference range

:



                                                                 166 - 358 10*3/

?L.



                                                                 The reference r

tatianna



                                                                 was not used to



                                                                 interpret this



                                                                 result as



                                                                 normal/abnormal

.

 

             MPV (test code = 10.4 fL      9.5-12.9                  



             30936-0)                                            

 

             NRBC/100 WBC (test              See_Comment                [Automat

ed



             code = [id number])                                        message] 

The system



                                                                 which generated



                                                                 this result



                                                                 transmitted



                                                                 reference range

:



                                                                 0.0 - 10.0 /100



                                                                 WBCs. The refer

ence



                                                                 range was not u

sed



                                                                 to interpret th

is



                                                                 result as



                                                                 normal/abnormal

.

 

             NRBC x10^3 (test code <0.01        See_Comment                [Auto

mated



             = [id number])                                        message] The s

ystem



                                                                 which generated



                                                                 this result



                                                                 transmitted



                                                                 reference range

:



                                                                 10*3/?L. The



                                                                 reference range

 was



                                                                 not used to



                                                                 interpret this



                                                                 result as



                                                                 normal/abnormal

.

 

             GRAN MAT (NEUT) % 76.8 %                                 



             (test code = 770-8)                                        

 

             IMM GRAN % (test code 0.40 %                                 



             = [id number])                                        

 

             LYMPH % (test code = 14.5 %                                 



             736-9)                                              

 

             MONO % (test code = 7.8 %                                  



             5905-5)                                             

 

             EOS % (test code = 0.1 %                                  



             713-8)                                              

 

             BASO % (test code = 0.4 %                                  



             706-2)                                              

 

             GRAN MAT x10^3(ANC) 5.87 10*3/uL 1.88-7.09                 



             (test code =                                        



             7944386707)                                         

 

             IMM GRAN x10^3 (test 0.03 10*3/uL 0.00-0.06                 



             code = 5614859987)                                        

 

             LYMPH x10^3 (test code 1.11 10*3/uL 1.32-3.29    L            



             = 731-0)                                            

 

             MONO x10^3 (test code 0.60 10*3/uL 0.33-0.92                 



             = 742-7)                                            

 

             EOS x10^3 (test code = <0.03        0.03-0.39    L            



             711-2)                                              

 

             BASO x10^3 (test code 0.03 10*3/uL 0.01-0.07                 



             = 704-7)                                            

 

             Lab Interpretation Abnormal                               



             (test code = 46948-4)                                        



[facility] WITH [id number] 14:51:56





             Test Item    Value        Reference Range Interpretation Comments

 

             WBC (test code =              See_Comment                [Automated



             3590-2)                                             message] The sy

stem



                                                                 which generated



                                                                 this result



                                                                 transmitted



                                                                 reference range

:



                                                                 4.30 - 11.10



                                                                 10*3/?L. The



                                                                 reference range

 was



                                                                 not used to



                                                                 interpret this



                                                                 result as



                                                                 normal/abnormal

.

 

             RBC (test code =              See_Comment                [Automated



             649-8)                                              message] The sy

stem



                                                                 which generated



                                                                 this result



                                                                 transmitted



                                                                 reference range

:



                                                                 3.93 - 5.25



                                                                 10*6/?L. The



                                                                 reference range

 was



                                                                 not used to



                                                                 interpret this



                                                                 result as



                                                                 normal/abnormal

.

 

             HGB (test code = 12.4 g/dL    11.6-15.0                 



             718-7)                                              

 

             HCT (test code = 39.2 %       35.7-45.2                 



             4544-3)                                             

 

             MCV (test code = 86.7 fL      80.6-95.5                 



             787-2)                                              

 

             MCH (test code = 27.4 pg      25.9-32.8                 



             785-6)                                              

 

             MCHC (test code = 31.6 g/dL    31.6-35.1                 



             786-4)                                              

 

             RDW-SD (test code = 49.2 fL      39.0-49.9                 



             01310-4)                                            

 

             RDW-CV (test code = 15.7 %       12.0-15.5    H            



             788-0)                                              

 

             PLT (test code =              See_Comment                [Automated



             777-3)                                              message] The sy

stem



                                                                 which generated



                                                                 this result



                                                                 transmitted



                                                                 reference range

:



                                                                 166 - 358 10*3/

?L.



                                                                 The reference r

tatianna



                                                                 was not used to



                                                                 interpret this



                                                                 result as



                                                                 normal/abnormal

.

 

             MPV (test code = 9.6 fL       9.5-12.9                  



             43593-6)                                            

 

             NRBC/100 WBC (test              See_Comment                [Automat

ed



             code = 1569926874)                                        message] 

The system



                                                                 which generated



                                                                 this result



                                                                 transmitted



                                                                 reference range

:



                                                                 0.0 - 10.0 /100



                                                                 WBCs. The refer

ence



                                                                 range was not u

sed



                                                                 to interpret th

is



                                                                 result as



                                                                 normal/abnormal

.

 

             NRBC x10^3 (test code <0.01        See_Comment                [Auto

mated



             = [id number])                                        message] The s

ystem



                                                                 which generated



                                                                 this result



                                                                 transmitted



                                                                 reference range

:



                                                                 10*3/?L. The



                                                                 reference range

 was



                                                                 not used to



                                                                 interpret this



                                                                 result as



                                                                 normal/abnormal

.

 

             GRAN MAT (NEUT) % 71.9 %                                 



             (test code = 770-8)                                        

 

             IMM GRAN % (test code 0.50 %                                 



             = 9868527640)                                        

 

             LYMPH % (test code = 17.0 %                                 



             736-9)                                              

 

             MONO % (test code = 8.0 %                                  



             5905-5)                                             

 

             EOS % (test code = 1.7 %                                  



             713-8)                                              

 

             BASO % (test code = 0.9 %                                  



             706-2)                                              

 

             GRAN MAT x10^3(ANC) 4.14 10*3/uL 1.88-7.09                 



             (test code =                                        



             0323913762)                                         

 

             IMM GRAN x10^3 (test 0.03 10*3/uL 0.00-0.06                 



             code = 1287766131)                                        

 

             LYMPH x10^3 (test code 0.98 10*3/uL 1.32-3.29    L            



             = 731-0)                                            

 

             MONO x10^3 (test code 0.46 10*3/uL 0.33-0.92                 



             = 742-7)                                            

 

             EOS x10^3 (test code = 0.10 10*3/uL 0.03-0.39                 



             711-2)                                              

 

             BASO x10^3 (test code 0.05 10*3/uL 0.01-0.07                 



             = 704-7)                                            

 

             Lab Interpretation Abnormal                               



             (test code = 49709-6)                                        



[id number]





             Test Item    Value        Reference Range Interpretation Comments

 

             Troponin-I (test code no gt        See_Comment                [Auto

mated message] The



             = Troponin-I)                                        system which g

enerated this



                                                                 result transmit

nic



                                                                 reference range

: <=0.40.



                                                                 The reference r

tatianna was not



                                                                 used to interpr

et this



                                                                 result as delmy

l/abnormal.



[id number]





             Test Item    Value        Reference Range Interpretation Comments

 

             BNP (test code = BNP) 79                                     



[facility] [employer] [id number] 18:15:00





             Test Item    Value        Reference Range Interpretation Comments

 

             Glucose Lvl (test code = Glucose Lvl) 152          70-99           

          



[facility] [employer] [id number] 18:15:00





             Test Item    Value        Reference Range Interpretation Comments

 

             BUN (test code = BUN) 17           7-22                      



[id number]





             Test Item    Value        Reference Range Interpretation Comments

 

             Creatinine Lvl (test code = Creatinine 0.77         0.50-1.40      

           



             Lvl)                                                



[facility] [id number] 18:15:00





             Test Item    Value        Reference Range Interpretation Comments

 

             Sodium Lvl (test code = Sodium Lvl) 139          135-145           

        



[facility] [id number] 18:15:00





             Test Item    Value        Reference Range Interpretation Comments

 

             Potassium Lvl (test code = Potassium 3.9          3.5-5.1          

         



             Lvl)                                                



[facility] [id number] 18:15:00





             Test Item    Value        Reference Range Interpretation Comments

 

             Chloride Lvl (test code = Chloride Lvl) 105                  

            



[facility] [id number] 18:15:00





             Test Item    Value        Reference Range Interpretation Comments

 

             CO2 (test code = CO2) 31           24-32                     



[facility] [id number] 18:15:00





             Test Item    Value        Reference Range Interpretation Comments

 

             Calcium Lvl (test code = Calcium Lvl) 9.0          8.5-10.5        

          



[facility] [id number] 18:15:00





             Test Item    Value        Reference Range Interpretation Comments

 

             Total Protein (test code = Total 7.1          6.4-8.4              

     



             Protein)                                            



[facility] [id number] 18:15:00





             Test Item    Value        Reference Range Interpretation Comments

 

             Albumin Lvl (test code = Albumin Lvl) 3.4          3.5-5.0         

          



[facility] [employer]2020-03-02 18:15:00





             Test Item    Value        Reference Range Interpretation Comments

 

             ALT (test code = ALT) 18           See_Comment                [Auto

mated message] The



                                                                 system which ge

nerated this



                                                                 result transmit

nic



                                                                 reference range

: <=65. The



                                                                 reference range

 was not



                                                                 used to interpr

et this



                                                                 result as delmy

l/abnormal.



[facility]2020-03-02 18:15:00





             Test Item    Value        Reference Range Interpretation Comments

 

             AST (test code = AST) 18           See_Comment                [Auto

mated message] The



                                                                 system which ge

nerated this



                                                                 result transmit

nic



                                                                 reference range

: <=37. The



                                                                 reference range

 was not



                                                                 used to interpr

et this



                                                                 result as delmy

l/abnormal.



[facility]-03-02 18:15:00





             Test Item    Value        Reference Range Interpretation Comments

 

             Alk Phos (test code = Alk Phos) 59                           

    



[facility] [employer]2020-03-02 18:15:00





             Test Item    Value        Reference Range Interpretation Comments

 

             Bili Total (test code = Bili Total) 0.3          0.2-1.3           

        



[facility]-03-02 18:15:00





             Test Item    Value        Reference Range Interpretation Comments

 

             AGAP (test code = AGAP) 6.9          10.0-20.0                 



[facility] Corthera-03-02 18:15:00





             Test Item    Value        Reference Range Interpretation Comments

 

             B/C Ratio (test code = B/C Ratio) 22 1         6-25                

      



[facility] Corthera-03-02 18:15:00





             Test Item    Value        Reference Range Interpretation Comments

 

             Globulin (test code = Globulin) 3.7          2.7-4.2               

    



[facility] [employer]2020-03-02 18:15:00





             Test Item    Value        Reference Range Interpretation Comments

 

             A/G Ratio (test code = A/G Ratio) 0.9 1        0.7-1.6             

      



[facility] Corthera-03-02 18:15:00





             Test Item    Value        Reference Range Interpretation Comments

 

             eGFR (test code = eGFR) 73                                     



[facility] [id number] 18:15:00





             Test Item    Value        Reference Range Interpretation Comments

 

             WBC (test code = WBC) 5.9          3.7-10.4                  



[facility] IowtoptUPGVLGEFVH6653-13-51 18:15:00





             Test Item    Value        Reference Range Interpretation Comments

 

             RBC (test code = RBC) 4.50         4.20-5.40                 



[place] [address]-03-02 18:15:00





             Test Item    Value        Reference Range Interpretation Comments

 

             Hgb (test code = Hgb) 12.8         12.0-16.0                 



[place] [address]-03-02 18:15:00





             Test Item    Value        Reference Range Interpretation Comments

 

             Hct (test code = Hct) 39.4         36.0-48.0                 



[place] [address]-03-02 18:15:00





             Test Item    Value        Reference Range Interpretation Comments

 

             MCV (test code = MCV) 87.6         80.0-98.0                 



[place] [address]-03-02 18:15:00





             Test Item    Value        Reference Range Interpretation Comments

 

             MCH (test code = MCH) 28.5 pg      27.0-31.0                 



[place] [address]-03-02 18:15:00





             Test Item    Value        Reference Range Interpretation Comments

 

             MCHC (test code = MCHC) 32.5         32.0-36.0                 



[place] [address]-03-02 18:15:00





             Test Item    Value        Reference Range Interpretation Comments

 

             RDW (test code = RDW) 15.2         11.5-14.5                 



[place] [address]-03-02 18:15:00





             Test Item    Value        Reference Range Interpretation Comments

 

             Platelet (test code = Platelet) 270          133-450               

    



[place] [address]-03-02 18:15:00





             Test Item    Value        Reference Range Interpretation Comments

 

             MPV (test code = MPV) 8.2          7.4-10.4                  



[place] [address]-03-02 18:15:00





             Test Item    Value        Reference Range Interpretation Comments

 

             PT (test code = PT) 12.6 s       12.0-14.7                 



[place] [address]-03-02 18:15:00





             Test Item    Value        Reference Range Interpretation Comments

 

             INR (test code = INR) 0.94 1       0.85-1.17                 



[place] [address]-03-02 18:15:00





             Test Item    Value        Reference Range Interpretation Comments

 

             PTT (test code = PTT) 28.4 s       22.9-35.8                 



[place] [address]-03-02 18:15:00





             Test Item    Value        Reference Range Interpretation Comments

 

             Segs (test code = Segs) 66.1         45.0-75.0                 



[place] 271780-03-02 18:15:00





             Test Item    Value        Reference Range Interpretation Comments

 

             Lymphocytes (test code = Lymphocytes) 23.9         20.0-40.0       

          



[facility]





             Test Item    Value        Reference Range Interpretation Comments

 

             Monocytes (test code = Monocytes) 7.5          2.0-12.0            

      



[facility][id number] 18:15:00





             Test Item    Value        Reference Range Interpretation Comments

 

             Eosinophils (test code = 1.5          See_Comment                [A

utomated message] The



             Eosinophils)                                        system which ge

nerated



                                                                 this result tra

nsmitted



                                                                 reference range

: <=4.0.



                                                                 The reference r

tatianna was



                                                                 not used to int

erpret



                                                                 this result as



                                                                 normal/abnormal

.



[facility]rqubbxXZTABVBWAV1822-77-14 18:15:00





             Test Item    Value        Reference Range Interpretation Comments

 

             Basophils (test code = 1.0          See_Comment                [Aut

omated message] The



             Basophils)                                          system which ge

nerated



                                                                 this result tra

nsmitted



                                                                 reference range

: <=1.0.



                                                                 The reference r

tatianna was



                                                                 not used to int

erpret



                                                                 this result as



                                                                 normal/abnormal

.



[facility]hlnehuSDOERACWLF9442-38-18 18:15:00





             Test Item    Value        Reference Range Interpretation Comments

 

             Neutrophils # (test code = Neutrophils 3.9          1.5-8.1        

           



             #)                                                  



[facility] HospitalLpxyxebVKERZMXJZJ6419-50-73 18:15:00





             Test Item    Value        Reference Range Interpretation Comments

 

             Lymphocytes # (test code = Lymphocytes 1.4          1.0-5.5        

           



             #)                                                  



[facility] Hospital[facility][id number] 18:15:00





             Test Item    Value        Reference Range Interpretation Comments

 

             Monocytes # (test code 0.4          See_Comment                [Aut

omated message] The



             = Monocytes #)                                        system which 

generated



                                                                 this result tra

nsmitted



                                                                 reference range

: <=0.8.



                                                                 The reference r

tatianna was



                                                                 not used to int

erpret



                                                                 this result as



                                                                 normal/abnormal

.



[facility] HospitalUalrpdfLXPHQAVDSI7736-52-06 18:15:00





             Test Item    Value        Reference Range Interpretation Comments

 

             Eosinophils # (test code 0.1          See_Comment                [A

utomated message] The



             = Eosinophils #)                                        system whic

h generated



                                                                 this result tra

nsmitted



                                                                 reference range

: <=0.5.



                                                                 The reference r

tatianna was



                                                                 not used to int

erpret



                                                                 this result as



                                                                 normal/abnormal

.



[place] [address]-03-02 18:15:00





             Test Item    Value        Reference Range Interpretation Comments

 

             Basophils # (test code 0.1          See_Comment                [Aut

omated message] The



             = Basophils #)                                        system which 

generated



                                                                 this result tra

nsmitted



                                                                 reference range

: <=0.2.



                                                                 The reference r

tatianna was



                                                                 not used to int

erpret



                                                                 this result as



                                                                 normal/abnormal

.



[facility] [id number] 21:56:00





             Test Item    Value        Reference Range Interpretation Comments

 

             Troponin-I (test code no gt        See_Comment                [Auto

mated message] The



             = Troponin-I)                                        system which g

enerated this



                                                                 result transmit

nic



                                                                 reference range

: <=0.40.



                                                                 The reference r

tatianna was not



                                                                 used to interpr

et this



                                                                 result as edlmy

l/abnormal.



[facility] [id number] 21:56:00





             Test Item    Value        Reference Range Interpretation Comments

 

             Total CK (test code = Total CK) 72                           

    



[facility] [id number] 21:56:00





             Test Item    Value        Reference Range Interpretation Comments

 

             BNP (test code = BNP) 90                                     



[facility]2019-10-03 21:56:00





             Test Item    Value        Reference Range Interpretation Comments

 

             Glucose Lvl (test code = Glucose Lvl) 106          70-99           

          



[facility]





             Test Item    Value        Reference Range Interpretation Comments

 

             BUN (test code = BUN) 14           7-22                      



[facility]2019-10-03 21:56:00





             Test Item    Value        Reference Range Interpretation Comments

 

             Creatinine Lvl (test code = Creatinine 0.68         0.50-1.40      

           



             Lvl)                                                



[facility]2019-10-03 21:56:00





             Test Item    Value        Reference Range Interpretation Comments

 

             Sodium Lvl (test code = Sodium Lvl) 139          135-145           

        



[facility] 21:56:00





             Test Item    Value        Reference Range Interpretation Comments

 

             Potassium Lvl (test code = Potassium 3.8          3.5-5.1          

         



             Lvl)                                                



[facility]2019-10-03 21:56:00





             Test Item    Value        Reference Range Interpretation Comments

 

             Chloride Lvl (test code = Chloride Lvl) 102                  

            



[facility] 21:56:00





             Test Item    Value        Reference Range Interpretation Comments

 

             CO2 (test code = CO2) 31           24-32                     



[facility]2019-10-03 21:56:00





             Test Item    Value        Reference Range Interpretation Comments

 

             Calcium Lvl (test code = Calcium Lvl) 8.5          8.5-10.5        

          



[facility]2019-10-03 21:56:00





             Test Item    Value        Reference Range Interpretation Comments

 

             Total Protein (test code = Total 6.8          6.4-8.4              

     



             Protein)                                            



[facility]2019-10-03 21:56:00





             Test Item    Value        Reference Range Interpretation Comments

 

             Albumin Lvl (test code = Albumin Lvl) 3.0          3.5-5.0         

          



[facility]2019-10-03 21:56:00





             Test Item    Value        Reference Range Interpretation Comments

 

             ALT (test code = ALT) 17           See_Comment                [Auto

mated message] The



                                                                 system which ge

nerated this



                                                                 result transmit

nic



                                                                 reference range

: <=65. The



                                                                 reference range

 was not



                                                                 used to interpr

et this



                                                                 result as delmy

l/abnormal.



[facility]2019-10-03 21:56:00





             Test Item    Value        Reference Range Interpretation Comments

 

             AST (test code = AST) 16           See_Comment                [Auto

mated message] The



                                                                 system which ge

nerated this



                                                                 result transmit

nic



                                                                 reference range

: <=37. The



                                                                 reference range

 was not



                                                                 used to interpr

et this



                                                                 result as delmy

l/abnormal.



[facility]2019-10-03 21:56:00





             Test Item    Value        Reference Range Interpretation Comments

 

             Alk Phos (test code = Alk Phos) 68                           

    



[facility]2019-10-03 21:56:00





             Test Item    Value        Reference Range Interpretation Comments

 

             Bili Total (test code = Bili Total) 0.4          0.2-1.3           

        



[facility]2019-10-03 21:56:00





             Test Item    Value        Reference Range Interpretation Comments

 

             eGFR (test code = eGFR) 83                                     



[facility]2019-10-03 21:56:00





             Test Item    Value        Reference Range Interpretation Comments

 

             AGAP (test code = AGAP) 9.8          10.0-20.0                 



[facility]2019-10-03 21:56:00





             Test Item    Value        Reference Range Interpretation Comments

 

             B/C Ratio (test code = B/C Ratio) 21 1         6-25                

      



[facility]2019-10-03 21:56:00





             Test Item    Value        Reference Range Interpretation Comments

 

             Globulin (test code = Globulin) 3.8          2.7-4.2               

    



[facility]2019-10-03 21:56:00





             Test Item    Value        Reference Range Interpretation Comments

 

             A/G Ratio (test code = A/G Ratio) 0.8 1        0.7-1.6             

      



[facility]2019-10-03 21:56:00





             Test Item    Value        Reference Range Interpretation Comments

 

             WBC (test code = WBC) 5.8          3.7-10.4                  



[facility]2019-10-03 21:56:00





             Test Item    Value        Reference Range Interpretation Comments

 

             RBC (test code = RBC) 4.23         4.20-5.40                 



[facility]2019-10-03 21:56:00





             Test Item    Value        Reference Range Interpretation Comments

 

             Hgb (test code = Hgb) 11.8         12.0-16.0                 



[facility]2019-10-03 21:56:00





             Test Item    Value        Reference Range Interpretation Comments

 

             Hct (test code = Hct) 35.6         36.0-48.0                 



[facility]2019-10-03 21:56:00





             Test Item    Value        Reference Range Interpretation Comments

 

             MCV (test code = MCV) 84.1         80.0-98.0                 



[facility]2019-10-03 21:56:00





             Test Item    Value        Reference Range Interpretation Comments

 

             MCH (test code = MCH) 27.9 pg      27.0-31.0                 



[facility]2019-10-03 21:56:00





             Test Item    Value        Reference Range Interpretation Comments

 

             MCHC (test code = MCHC) 33.2         32.0-36.0                 



[facility]2019-10-03 21:56:00





             Test Item    Value        Reference Range Interpretation Comments

 

             RDW (test code = RDW) 15.1         11.5-14.5                 



[facility]2019-10-03 21:56:00





             Test Item    Value        Reference Range Interpretation Comments

 

             Platelet (test code = Platelet) 256          133-450               

    



[facility]2019-10-03 21:56:00





             Test Item    Value        Reference Range Interpretation Comments

 

             MPV (test code = MPV) 7.6          7.4-10.4                  



[facility]2019-10-03 21:56:00





             Test Item    Value        Reference Range Interpretation Comments

 

             PT (test code = PT) 13.3 s       12.0-14.7                 



[facility]2019-10-03 21:56:00





             Test Item    Value        Reference Range Interpretation Comments

 

             INR (test code = INR) 1.03 1       0.85-1.17                 



[facility]2019-10-03 21:56:00





             Test Item    Value        Reference Range Interpretation Comments

 

             PTT (test code = PTT) 31.7 s       22.9-35.8                 



[facility]2019-10-03 21:56:00





             Test Item    Value        Reference Range Interpretation Comments

 

             Segs (test code = Segs) 55.6         45.0-75.0                 



[facility]2019-10-03 21:56:00





             Test Item    Value        Reference Range Interpretation Comments

 

             Lymphocytes (test code = Lymphocytes) 28.8         20.0-40.0       

          



[facility]2019-10-03 21:56:00





             Test Item    Value        Reference Range Interpretation Comments

 

             Monocytes (test code = Monocytes) 9.9          2.0-12.0            

      



[facility]2019-10-03 21:56:00





             Test Item    Value        Reference Range Interpretation Comments

 

             Eosinophils (test code = 4.5          See_Comment                [A

utomated message] The



             Eosinophils)                                        system which ge

nerated



                                                                 this result tra

nsmitted



                                                                 reference range

: <=4.0.



                                                                 The reference r

tatianna was



                                                                 not used to int

erpret



                                                                 this result as



                                                                 normal/abnormal

.



[facility] Hospital[id number] 21:56:00





             Test Item    Value        Reference Range Interpretation Comments

 

             Basophils (test code = 1.2          See_Comment                [Aut

omated message] The



             Basophils)                                          system which ge

nerated



                                                                 this result tra

nsmitted



                                                                 reference range

: <=1.0.



                                                                 The reference r

tatianna was



                                                                 not used to int

erpret



                                                                 this result as



                                                                 normal/abnormal

.



[facility] H[id number] 21:56:00





             Test Item    Value        Reference Range Interpretation Comments

 

             Neutrophils # (test code = Neutrophils 3.2          1.5-8.1        

           



             #)                                                  



[facility]2019-10-03 21:56:00





             Test Item    Value        Reference Range Interpretation Comments

 

             Lymphocytes # (test code = Lymphocytes 1.7          1.0-5.5        

           



             #)                                                  



[facility]2019-10-03 21:56:00





             Test Item    Value        Reference Range Interpretation Comments

 

             Monocytes # (test code 0.6          See_Comment                [Aut

omated message] The



             = Monocytes #)                                        system which 

generated



                                                                 this result tra

nsmitted



                                                                 reference range

: <=0.8.



                                                                 The reference r

tatianna was



                                                                 not used to int

erpret



                                                                 this result as



                                                                 normal/abnormal

.



[facility]





             Test Item    Value        Reference Range Interpretation Comments

 

             Eosinophils # (test code 0.3          See_Comment                [A

utomated message] The



             = Eosinophils #)                                        system whic

h generated



                                                                 this result tra

nsmitted



                                                                 reference range

: <=0.5.



                                                                 The reference r

tatianna was



                                                                 not used to int

erpret



                                                                 this result as



                                                                 normal/abnormal

.



[facility] Hospital[facility]bvamqoRWVIKISRXS7924-04-83 21:56:00





             Test Item    Value        Reference Range Interpretation Comments

 

             Basophils # (test code 0.1          See_Comment                [Aut

omated message] The



             = Basophils #)                                        system which 

generated



                                                                 this result tra

nsmitted



                                                                 reference range

: <=0.2.



                                                                 The reference r

tatianna was



                                                                 not used to int

erpret



                                                                 this result as



                                                                 normal/abnormal

.



[id number]14[facility]00





             Test Item    Value        Reference Range Interpretation Comments

 

             Troponin-I (test code no gt        See_Comment                [Auto

mated message] The



             = Troponin-I)                                        system which g

enerated this



                                                                 result transmit

nic



                                                                 reference range

: <=0.40.



                                                                 The reference r

tatianna was not



                                                                 used to interpr

et this



                                                                 result as delmy

l/abnormal.



[id number]





             Test Item    Value        Reference Range Interpretation Comments

 

             D-Dimer (test code = D-Dimer) 2.41                                 

  



[facility] AND [id number] 20:50:00





             Test Item    Value        Reference Range Interpretation Comments

 

             UA Sq Epi (test code = UA Sq Epi) None Seen                        

      



[facility] AND [id number] 20:50:00





             Test Item    Value        Reference Range Interpretation Comments

 

             UA Urobilinogen (test code = UA <=1.0 mg/dL  0.1-1.0               

    



             Urobilinogen)                                        



[facility] AND [id number] 20:50:00





             Test Item    Value        Reference Range Interpretation Comments

 

             UA Color (test code = UA Color) Ltyellow                           

    



[facility] AND [id number] 20:50:00





             Test Item    Value        Reference Range Interpretation Comments

 

             UA Blood (test code = Negative (18 3:50                       

    



             UA Blood)    PM)                                    



[facility] AND [id number] 20:50:00





             Test Item    Value        Reference Range Interpretation Comments

 

             UA Bili (test code = Negative *NA*(18                         

  



             UA Bili)     3:50 PM)                               



[facility] AND [id number] 20:50:00





             Test Item    Value        Reference Range Interpretation Comments

 

             UA Glucose (test code = UA Negative mg/dL                          

 



             Glucose)                                            



[facility] AND [id number] 20:50:00





             Test Item    Value        Reference Range Interpretation Comments

 

             UA Ketones (test code = UA Negative mg/dL                          

 



             Ketones)                                            



[facility] AND [id number] 20:50:00





             Test Item    Value        Reference Range Interpretation Comments

 

             UA Nitrite (test code Negative (18 3:50                       

    



             = UA Nitrite) PM)                                    



[facility] AND [id number] 20:50:00





             Test Item    Value        Reference Range Interpretation Comments

 

             UA Leuk Est (test Negative (18 3:50                           



             code = UA Leuk Est) PM)                                    



[facility] AND [id number] 20:50:00





             Test Item    Value        Reference Range Interpretation Comments

 

             UA WBC (test code = no gt        See_Comment                [Automa

nic message] The



             UA WBC)                                             system which ge

nerated this



                                                                 result transmit

nic



                                                                 reference range

: <=5. The



                                                                 reference range

 was not



                                                                 used to interpr

et this



                                                                 result as delmy

l/abnormal.



[facility] AND [id number] 20:50:00





             Test Item    Value        Reference Range Interpretation Comments

 

             UA pH (test code = UA pH) 8.0 1        5.0-8.0                   



[facility] AND [id number] 20:50:00





             Test Item    Value        Reference Range Interpretation Comments

 

             UA Protein (test code = UA Negative mg/dL                          

 



             Protein)                                            



[facility] AND [id number] 20:50:00





             Test Item    Value        Reference Range Interpretation Comments

 

             UA Spec Grav (test code = UA Spec 1.003 1                          

      



             Grav)                                               



[facility] AND [id number] 20:50:00





             Test Item    Value        Reference Range Interpretation Comments

 

             UA Turbidity (test code = Clear (18 3:50                      

     



             UA Turbidity) PM)                                    



[facility] [id number] 19:25:00





             Test Item    Value        Reference Range Interpretation Comments

 

             Troponin-I (test code no gt        See_Comment                [Auto

mated message] The



             = Troponin-I)                                        system which g

enerated this



                                                                 result transmit

nic



                                                                 reference range

: <=0.40.



                                                                 The reference r

tatianna was not



                                                                 used to interpr

et this



                                                                 result as delmy

l/abnormal.



[facility] [employer] [id number] 19:25:00





             Test Item    Value        Reference Range Interpretation Comments

 

             Total CK (test code = Total CK) 64                           

    



[facility] [employer] [id number] 19:25:00





             Test Item    Value        Reference Range Interpretation Comments

 

             CK MB (test code = CK MB) 1.5          0.5-3.6                   



[facility] [employer] [id number] 19:25:00





             Test Item    Value        Reference Range Interpretation Comments

 

             BNP (test code = BNP) 60                                     



[facility] [id number] 19:25:00





             Test Item    Value        Reference Range Interpretation Comments

 

             CK MB Index (test 2.3 1        See_Comment                [Automate

d message] The



             code = CK MB Index)                                        system w

[facility] generated this



                                                                 result transmit

nic



                                                                 reference range

: <=2.5. The



                                                                 reference range

 was not



                                                                 used to interpr

et this



                                                                 result as delmy

l/abnormal.



[employer] [id number] 19:25:00





             Test Item    Value        Reference Range Interpretation Comments

 

             eGFR (test code = eGFR) 86                                     



[facility] [employer] [id number] 19:25:00





             Test Item    Value        Reference Range Interpretation Comments

 

             Calcium Lvl (test code = Calcium Lvl) 8.6          8.5-10.5        

          



[facility] [employer] [id number] 19:25:00





             Test Item    Value        Reference Range Interpretation Comments

 

             Total Protein (test code = Total 7.0          6.4-8.4              

     



             Protein)                                            



[facility] [employer] [id number] 19:25:00





             Test Item    Value        Reference Range Interpretation Comments

 

             ALT (test code = ALT) 19           See_Comment                [Auto

mated message] The



                                                                 system which ge

nerated this



                                                                 result transmit

nic



                                                                 reference range

: <=65. The



                                                                 reference range

 was not



                                                                 used to interpr

et this



                                                                 result as delmy

l/abnormal.



[employer] [id number] 19:25:00





             Test Item    Value        Reference Range Interpretation Comments

 

             Albumin Lvl (test code = Albumin Lvl) 3.5          3.5-5.0         

          



[facility] [employer] [id number] 19:25:00





             Test Item    Value        Reference Range Interpretation Comments

 

             Alk Phos (test code = Alk Phos) 52                           

    



[facility]2018-06-26 19:25:00





             Test Item    Value        Reference Range Interpretation Comments

 

             Bili Total (test code = Bili Total) 0.2          0.2-1.3           

        



[facility]2018-06-26 19:25:00





             Test Item    Value        Reference Range Interpretation Comments

 

             AST (test code = AST) 10           See_Comment                [Auto

mated message] The



                                                                 system which ge

nerated this



                                                                 result transmit

nic



                                                                 reference range

: <=37. The



                                                                 reference range

 was not



                                                                 used to interpr

et this



                                                                 result as delmy

l/abnormal.



[facility]2018-06-26 19:25:00





             Test Item    Value        Reference Range Interpretation Comments

 

             AGAP (test code = AGAP) 9.7          10.0-20.0                 



[facility]2018-06-26 19:25:00





             Test Item    Value        Reference Range Interpretation Comments

 

             B/C Ratio (test code = B/C Ratio) 21 1         6-25                

      



[facility]2018-06-26 19:25:00





             Test Item    Value        Reference Range Interpretation Comments

 

             A/G Ratio (test code = A/G Ratio) 1.0 1        0.7-1.6             

      



[facility]2018-06-26 19:25:00





             Test Item    Value        Reference Range Interpretation Comments

 

             Globulin (test code = Globulin) 3.5          2.7-4.2               

    



[facility]2018-06-26 19:25:00





             Test Item    Value        Reference Range Interpretation Comments

 

             Glucose Lvl (test code = Glucose Lvl) 110          70-99           

          



[facility]2018-06-26 19:25:00





             Test Item    Value        Reference Range Interpretation Comments

 

             BUN (test code = BUN) 13           7-22                      



[facility]2018-06-26 19:25:00





             Test Item    Value        Reference Range Interpretation Comments

 

             Potassium Lvl (test code = Potassium 3.7          3.5-5.1          

         



             Lvl)                                                



[facility]2018-06-26 19:25:00





             Test Item    Value        Reference Range Interpretation Comments

 

             Creatinine Lvl (test code = Creatinine 0.61         0.50-1.40      

           



             Lvl)                                                



[facility]2018-06-26 19:25:00





             Test Item    Value        Reference Range Interpretation Comments

 

             Sodium Lvl (test code = Sodium Lvl) 142          135-145           

        



[facility]2018-06-26 19:25:00





             Test Item    Value        Reference Range Interpretation Comments

 

             Chloride Lvl (test code = Chloride Lvl) 106                  

            



[facility]2018-06-26 19:25:00





             Test Item    Value        Reference Range Interpretation Comments

 

             CO2 (test code = CO2) 30           24-32                     



[id number] 19:25:00





             Test Item    Value        Reference Range Interpretation Comments

 

             MPV (test code = MPV) 7.9          7.4-10.4                  



[facility] [id number] 19:25:00





             Test Item    Value        Reference Range Interpretation Comments

 

             Platelet (test code = Platelet) 255          133-450               

    



[facility] [id number] 19:25:00





             Test Item    Value        Reference Range Interpretation Comments

 

             RDW (test code = RDW) 15.0         11.5-14.5                 



[facility] [id number] 19:25:00





             Test Item    Value        Reference Range Interpretation Comments

 

             MCH (test code = MCH) 29.4 pg      27.0-31.0                 



[facility] [id number] 19:25:00





             Test Item    Value        Reference Range Interpretation Comments

 

             Hgb (test code = Hgb) 12.5         12.0-16.0                 



[facility] [id number] 19:25:00





             Test Item    Value        Reference Range Interpretation Comments

 

             RBC (test code = RBC) 4.27         4.20-5.40                 



[facility] [id number] 19:25:00





             Test Item    Value        Reference Range Interpretation Comments

 

             MCHC (test code = MCHC) 33.3         32.0-36.0                 



[facility] H[id number] 19:25:00





             Test Item    Value        Reference Range Interpretation Comments

 

             MCV (test code = MCV) 88.3         80.0-98.0                 



[facility] [id number] 19:25:00





             Test Item    Value        Reference Range Interpretation Comments

 

             Hct (test code = Hct) 37.7         36.0-48.0                 



[facility] [id number] 19:25:00





             Test Item    Value        Reference Range Interpretation Comments

 

             WBC (test code = WBC) 6.6          3.7-10.4                  



[facility] H[id number] 19:25:00





             Test Item    Value        Reference Range Interpretation Comments

 

             Monocytes # (test code 0.6          See_Comment                [Aut

omated message] The



             = Monocytes #)                                        system which 

generated



                                                                 this result tra

nsmitted



                                                                 reference range

: <=0.8.



                                                                 The reference r

tatianna was



                                                                 not used to int

erpret



                                                                 this result as



                                                                 normal/abnormal

.



[facility] H[id number] 19:25:00





             Test Item    Value        Reference Range Interpretation Comments

 

             Eosinophils # (test code 0.2          See_Comment                [A

utomated message] The



             = Eosinophils #)                                        system whic

h generated



                                                                 this result tra

nsmitted



                                                                 reference range

: <=0.5.



                                                                 The reference r

tatianna was



                                                                 not used to int

erpret



                                                                 this result as



                                                                 normal/abnormal

.



[facility] [id number] 19:25:00





             Test Item    Value        Reference Range Interpretation Comments

 

             Segs-Bands # (test code = Segs-Bands #) 4.3          1.5-8.1       

            



[facility] H[id number] 19:25:00





             Test Item    Value        Reference Range Interpretation Comments

 

             Basophils (test code = 0.6          See_Comment                [Aut

omated message] The



             Basophils)                                          system which ge

nerated



                                                                 this result tra

nsmitted



                                                                 reference range

: <=1.0.



                                                                 The reference r

tatianna was



                                                                 not used to int

erpret



                                                                 this result as



                                                                 normal/abnormal

.



[facility] H[id number] 19:25:00





             Test Item    Value        Reference Range Interpretation Comments

 

             Lymphocytes # (test code = Lymphocytes 1.5          1.0-5.5        

           



             #)                                                  



[facility] [id number] 19:25:00





             Test Item    Value        Reference Range Interpretation Comments

 

             Eosinophils (test code = 3.5          See_Comment                [A

utomated message] The



             Eosinophils)                                        system which ge

nerated



                                                                 this result tra

nsmitted



                                                                 reference range

: <=4.0.



                                                                 The reference r

tatianna was



                                                                 not used to int

erpret



                                                                 this result as



                                                                 normal/abnormal

.



[facility] H[id number] 19:25:00





             Test Item    Value        Reference Range Interpretation Comments

 

             Monocytes (test code = Monocytes) 9.7          2.0-12.0            

      



[facility] H[id number] 19:25:00





             Test Item    Value        Reference Range Interpretation Comments

 

             Segs (test code = Segs) 64.3         45.0-75.0                 



[facility] Hos[id number] 19:25:00





             Test Item    Value        Reference Range Interpretation Comments

 

             Lymphocytes (test code = Lymphocytes) 21.9         20.0-40.0       

          



[facility] [employer] [id number] 01:44:00





             Test Item    Value        Reference Range Interpretation Comments

 

             CK MB Index (test 3.1 1        See_Comment                [Automate

d message] The



             code = CK MB Index)                                        system w

[facility] generated this



                                                                 result transmit

nic



                                                                 reference range

: <=2.5. The



                                                                 reference range

 was not



                                                                 used to interpr

et this



                                                                 result as delmy

l/abnormal.



[facility] [employer] [id number] 01:44:00





             Test Item    Value        Reference Range Interpretation Comments

 

             Troponin-I (test code no gt        See_Comment                [Auto

mated message] The



             = Troponin-I)                                        system which g

enerated this



                                                                 result transmit

nic



                                                                 reference range

: <=0.40.



                                                                 The reference r

tatianna was not



                                                                 used to interpr

et this



                                                                 result as delmy

l/abnormal.



[facility] [employer]2018-03-22 01:44:00





             Test Item    Value        Reference Range Interpretation Comments

 

             CK MB (test code = CK MB) 1.3          0.5-3.6                   



[facility] [employer] [id number] 01:44:00





             Test Item    Value        Reference Range Interpretation Comments

 

             Total CK (test code = Total CK) 42                           

    



[facility] [employer]2018-03-22 01:44:00





             Test Item    Value        Reference Range Interpretation Comments

 

             Globulin (test code = Globulin) 3.4          2.7-4.2               

    



[facility] [employer]2018-03-22 01:44:00





             Test Item    Value        Reference Range Interpretation Comments

 

             A/G Ratio (test code = A/G Ratio) 0.9 1        0.7-1.6             

      



[facility] [employer] [id number] 01:44:00





             Test Item    Value        Reference Range Interpretation Comments

 

             AGAP (test code = AGAP) 9.4          10.0-20.0                 



[facility]2018-03-22 01:44:00





             Test Item    Value        Reference Range Interpretation Comments

 

             B/C Ratio (test code = B/C Ratio) 20 1         6-25                

      



[facility]2018-03-22 01:44:00





             Test Item    Value        Reference Range Interpretation Comments

 

             eGFR (test code = eGFR) 84                                     



[facility] [employer]2018-03-22 01:44:00





             Test Item    Value        Reference Range Interpretation Comments

 

             Bili Total (test code = Bili Total) 0.3          0.2-1.3           

        



[facility]2018-03-22 01:44:00





             Test Item    Value        Reference Range Interpretation Comments

 

             Alk Phos (test code = Alk Phos) 60                           

    



[facility]2018-03-22 01:44:00





             Test Item    Value        Reference Range Interpretation Comments

 

             Creatinine Lvl (test code = Creatinine 0.66         0.50-1.40      

           



             Lvl)                                                



[facility]2018-03-22 01:44:00





             Test Item    Value        Reference Range Interpretation Comments

 

             CO2 (test code = CO2) 32           24-32                     



[facility]2018-03-22 01:44:00





             Test Item    Value        Reference Range Interpretation Comments

 

             ALT (test code = ALT) 27           See_Comment                [Auto

mated message] The



                                                                 system which ge

nerated this



                                                                 result transmit

nic



                                                                 reference range

: <=65. The



                                                                 reference range

 was not



                                                                 used to interpr

et this



                                                                 result as delmy

l/abnormal.



[facility]2018-03-22 01:44:00





             Test Item    Value        Reference Range Interpretation Comments

 

             Albumin Lvl (test code = Albumin Lvl) 3.0          3.5-5.0         

          



[facility]2018-03-22 01:44:00





             Test Item    Value        Reference Range Interpretation Comments

 

             AST (test code = AST) 16           See_Comment                [Auto

mated message] The



                                                                 system which ge

nerated this



                                                                 result transmit

nic



                                                                 reference range

: <=37. The



                                                                 reference range

 was not



                                                                 used to interpr

et this



                                                                 result as delmy

l/abnormal.



[facility]2018-03-22 01:44:00





             Test Item    Value        Reference Range Interpretation Comments

 

             Total Protein (test code = Total 6.4          6.4-8.4              

     



             Protein)                                            



[facility]2018-03-22 01:44:00





             Test Item    Value        Reference Range Interpretation Comments

 

             Calcium Lvl (test code = Calcium Lvl) 7.5          8.5-10.5        

          



[facility]2018-03-22 01:44:00





             Test Item    Value        Reference Range Interpretation Comments

 

             Potassium Lvl (test code = Potassium 3.4          3.5-5.1          

         



             Lvl)                                                



[facility]2018-03-22 01:44:00





             Test Item    Value        Reference Range Interpretation Comments

 

             Chloride Lvl (test code = Chloride Lvl) 104                  

            



[facility]2018-03-22 01:44:00





             Test Item    Value        Reference Range Interpretation Comments

 

             Sodium Lvl (test code = Sodium Lvl) 142          135-145           

        



[facility]2018-03-22 01:44:00





             Test Item    Value        Reference Range Interpretation Comments

 

             BUN (test code = BUN) 13           7-22                      



[facility]2018-03-22 01:44:00





             Test Item    Value        Reference Range Interpretation Comments

 

             Glucose Lvl (test code = Glucose Lvl) 86           70-99           

          



[id number]





             Test Item    Value        Reference Range Interpretation Comments

 

             Basophils # (test code 0.1          See_Comment                [Aut

omated message] The



             = Basophils #)                                        system which 

generated



                                                                 this result tra

nsmitted



                                                                 reference range

: <=0.2.



                                                                 The reference r

tatianna was



                                                                 not used to int

erpret



                                                                 this result as



                                                                 normal/abnormal

.



[id number] 01:44:00





             Test Item    Value        Reference Range Interpretation Comments

 

             Eosinophils # (test code 0.3          See_Comment                [A

utomated message] The



             = Eosinophils #)                                        system whic

h generated



                                                                 this result tra

nsmitted



                                                                 reference range

: <=0.5.



                                                                 The reference r

tatianna was



                                                                 not used to int

erpret



                                                                 this result as



                                                                 normal/abnormal

.



[id number] 01:44:00





             Test Item    Value        Reference Range Interpretation Comments

 

             Eosinophils (test code = 6.6          See_Comment                [A

utomated message] The



             Eosinophils)                                        system which ge

nerated



                                                                 this result tra

nsmitted



                                                                 reference range

: <=4.0.



                                                                 The reference r

tatianna was



                                                                 not used to int

erpret



                                                                 this result as



                                                                 normal/abnormal

.



[id number] 01:44:00





             Test Item    Value        Reference Range Interpretation Comments

 

             Monocytes (test code = Monocytes) 11.5         2.0-12.0            

      



[facility] HospitalHplpvkgSRCYIKJWRR5846-06-44 01:44:00





             Test Item    Value        Reference Range Interpretation Comments

 

             Segs-Bands # (test code = Segs-Bands #) 1.9          1.5-8.1       

            



[facility] H[id number] 01:44:00





             Test Item    Value        Reference Range Interpretation Comments

 

             Basophils (test code = 1.3          See_Comment                [Aut

omated message] The



             Basophils)                                          system which ge

nerated



                                                                 this result tra

nsmitted



                                                                 reference range

: <=1.0.



                                                                 The reference r

tatianna was



                                                                 not used to int

erpret



                                                                 this result as



                                                                 normal/abnormal

.



[facility] Hospit[id number] 01:44:00





             Test Item    Value        Reference Range Interpretation Comments

 

             Monocytes # (test code 0.5          See_Comment                [Aut

omated message] The



             = Monocytes #)                                        system which 

generated



                                                                 this result tra

nsmitted



                                                                 reference range

: <=0.8.



                                                                 The reference r

tatianna was



                                                                 not used to int

erpret



                                                                 this result as



                                                                 normal/abnormal

.



[facility] HospitalDlkiinuVNUPWPTCYR2796-06-24 01:44:00





             Test Item    Value        Reference Range Interpretation Comments

 

             Lymphocytes # (test code = Lymphocytes 1.8          1.0-5.5        

           



             #)                                                  



[facility] Hospit[id number] 01:44:00





             Test Item    Value        Reference Range Interpretation Comments

 

             Lymphocytes (test code = Lymphocytes) 40.2         20.0-40.0       

          



[facility] Hos[id number] 01:44:00





             Test Item    Value        Reference Range Interpretation Comments

 

             Segs (test code = Segs) 40.4         45.0-75.0                 



[facility] HospitalPqsrimbUCUPQPFLJZ3502-74-22 01:44:00





             Test Item    Value        Reference Range Interpretation Comments

 

             MPV (test code = MPV) 8.2          7.4-10.4                  



[facility] HospitalEbyjxnkMJBQPACUZR4364-60-93 01:44:00





             Test Item    Value        Reference Range Interpretation Comments

 

             Platelet (test code = Platelet) 205          133-450               

    



[facility] HospitalUcohhqcYAWVWJTVRT7842-81-91 01:44:00





             Test Item    Value        Reference Range Interpretation Comments

 

             RDW (test code = RDW) 14.2         11.5-14.5                 



[facility] HospitalOzagmtcFBNVIYJRLZ0320-47-05 01:44:00





             Test Item    Value        Reference Range Interpretation Comments

 

             MCHC (test code = MCHC) 33.9         32.0-36.0                 



[facility] HospitalKqlgsjtFASSZRXXVL0731-78-60 01:44:00





             Test Item    Value        Reference Range Interpretation Comments

 

             Hct (test code = Hct) 35.8         36.0-48.0                 



[facility] H[id number] 01:44:00





             Test Item    Value        Reference Range Interpretation Comments

 

             MCV (test code = MCV) 90.2         80.0-98.0                 



[facility] Ho[id number] 01:44:00





             Test Item    Value        Reference Range Interpretation Comments

 

             Hgb (test code = Hgb) 12.2         12.0-16.0                 



[facility] H[id number] 01:44:00





             Test Item    Value        Reference Range Interpretation Comments

 

             RBC (test code = RBC) 3.97         4.20-5.40                 



[facility] HospitalZoxqqgbKEYDETBVGE4846-45-55 01:44:00





             Test Item    Value        Reference Range Interpretation Comments

 

             WBC (test code = WBC) 4.6          3.7-10.4                  



[facility] Hos[id number] 01:44:00





             Test Item    Value        Reference Range Interpretation Comments

 

             MCH (test code = MCH) 30.6 pg      27.0-31.0                 



[facility] [id number] 01:44:00





             Test Item    Value        Reference Range Interpretation Comments

 

             UA Urobilinogen (test code = UA <=1.0 mg/dL  0.1-1.0               

    



             Urobilinogen)                                        



[facility] [id number] 01:44:00





             Test Item    Value        Reference Range Interpretation Comments

 

             UA Bili (test code = Negative *NA*(3/21/18                         

  



             UA Bili)     8:44 PM)                               



[facility] [id number] 01:44:00





             Test Item    Value        Reference Range Interpretation Comments

 

             UA Ketones (test code = UA Negative mg/dL                          

 



             Ketones)                                            



[facility] [id number] 01:44:00





             Test Item    Value        Reference Range Interpretation Comments

 

             UA WBC (test code = 3            See_Comment                [Automa

nic message] The



             UA WBC)                                             system which ge

nerated this



                                                                 result transmit

nic



                                                                 reference range

: <=5. The



                                                                 reference range

 was not



                                                                 used to interpr

et this



                                                                 result as delmy

l/abnormal.



[facility] [id number] 01:44:00





             Test Item    Value        Reference Range Interpretation Comments

 

             UA Nitrite (test code Negative (3/21/18 8:44                       

    



             = UA Nitrite) PM)                                    



[facility] [id number] 01:44:00





             Test Item    Value        Reference Range Interpretation Comments

 

             UA Blood (test code = Negative (3/21/18 8:44                       

    



             UA Blood)    PM)                                    



[facility] [id number] 01:44:00





             Test Item    Value        Reference Range Interpretation Comments

 

             UA Leuk Est (test Negative (3/21/18 8:44                           



             code = UA Leuk Est) PM)                                    



[facility] [id number] 01:44:00





             Test Item    Value        Reference Range Interpretation Comments

 

             UA Sq Epi (test code = UA Sq Epi) None Seen                        

      



[facility] AND [id number] 01:44:00





             Test Item    Value        Reference Range Interpretation Comments

 

             UA RBC (test code = 1            See_Comment                [Automa

nic message] The



             UA RBC)                                             system which ge

nerated this



                                                                 result transmit

nic



                                                                 reference range

: <=2. The



                                                                 reference range

 was not



                                                                 used to interpr

et this



                                                                 result as delmy

l/abnormal.



[facility] [id number] 01:44:00





             Test Item    Value        Reference Range Interpretation Comments

 

             UA Turbidity (test code = Clear (3/21/18 8:44                      

     



             UA Turbidity) PM)                                    



[facility] [id number] 01:44:00





             Test Item    Value        Reference Range Interpretation Comments

 

             UA Color (test code = Yellow *NA*(3/21/18                          

 



             UA Color)    8:44 PM)                               



[facility] [id number] 01:44:00





             Test Item    Value        Reference Range Interpretation Comments

 

             UA pH (test code = UA pH) 7.0 1        5.0-8.0                   



[facility] AND [id number] 01:44:00





             Test Item    Value        Reference Range Interpretation Comments

 

             UA Spec Grav (test code = UA Spec 1.005 1                          

      



             Grav)                                               



[facility] [id number] 01:44:00





             Test Item    Value        Reference Range Interpretation Comments

 

             UA Protein (test code = UA Negative mg/dL                          

 



             Protein)                                            



[facility] [id number] 01:44:00





             Test Item    Value        Reference Range Interpretation Comments

 

             UA Glucose (test code = UA Negative mg/dL                          

 



             Glucose)                                            



[id number]





             Test Item    Value        Reference Range Interpretation Comments

 

             AGAP (test code = AGAP) 7.0          10.0-20.0                 



[facility]WypgzfvWSHNYUSPQDHW7810-87-81 19:58:00





             Test Item    Value        Reference Range Interpretation Comments

 

             eGFR (test code = eGFR) 84                                     



[id number] 19:58:00





             Test Item    Value        Reference Range Interpretation Comments

 

             Potassium Lvl (test code = Potassium 5.0          3.5-5.1          

         



             Lvl)                                                



[facility]NceuwcxIZXCFKCVJFPH6198-22-16 19:58:00





             Test Item    Value        Reference Range Interpretation Comments

 

             Sodium Lvl (test code = Sodium Lvl) 137          135-145           

        



[id number] 19:58:00





             Test Item    Value        Reference Range Interpretation Comments

 

             Chloride Lvl (test code = Chloride Lvl) 103                  

            



[facility]FxxqwvpCALWUBCTFRIW8675-43-12 19:58:00





             Test Item    Value        Reference Range Interpretation Comments

 

             Creatinine Lvl (test code = Creatinine 0.70         0.50-1.40      

           



             Lvl)                                                



[facility] Reg[id number] 19:58:00





             Test Item    Value        Reference Range Interpretation Comments

 

             Glucose Lvl (test code = Glucose Lvl) 101          70-99           

          



[id number] 19:58:00





             Test Item    Value        Reference Range Interpretation Comments

 

             BUN (test code = BUN) 10           7-22                      



[facility] Reg[id number] 19:58:00





             Test Item    Value        Reference Range Interpretation Comments

 

             Calcium Lvl (test code = Calcium Lvl) 8.9          8.5-10.5        

          



[facility] RegionZuarywoVVEEZQDXHCUT1616-79-67 19:58:00





             Test Item    Value        Reference Range Interpretation Comments

 

             CO2 (test code = CO2) 32           24-32                     



[facility]AbkathcDRIAIPDZYJ9205-29-35 19:58:00





             Test Item    Value        Reference Range Interpretation Comments

 

             Segs-Bands # (test code = Segs-Bands #) 5.6          1.5-8.1       

            



[facility] Ho[id number] 19:58:00





             Test Item    Value        Reference Range Interpretation Comments

 

             Basophils (test code = 1.1          See_Comment                [Aut

omated message] The



             Basophils)                                          system which ge

nerated



                                                                 this result tra

nsmitted



                                                                 reference range

: <=1.0.



                                                                 The reference r

tatianna was



                                                                 not used to int

erpret



                                                                 this result as



                                                                 normal/abnormal

.



[facility] H[id number] 19:58:00





             Test Item    Value        Reference Range Interpretation Comments

 

             Lymphocytes # (test code = Lymphocytes 1.9          1.0-5.5        

           



             #)                                                  



[facility] H[id number] 19:58:00





             Test Item    Value        Reference Range Interpretation Comments

 

             Monocytes # (test code 0.8          See_Comment                [Aut

omated message] The



             = Monocytes #)                                        system which 

generated



                                                                 this result tra

nsmitted



                                                                 reference range

: <=0.8.



                                                                 The reference r

tatianna was



                                                                 not used to int

erpret



                                                                 this result as



                                                                 normal/abnormal

.



[facility] H[id number] 19:58:00





             Test Item    Value        Reference Range Interpretation Comments

 

             Eosinophils # (test code 0.2          See_Comment                [A

utomated message] The



             = Eosinophils #)                                        system whic

h generated



                                                                 this result tra

nsmitted



                                                                 reference range

: <=0.5.



                                                                 The reference r

tatianna was



                                                                 not used to int

erpret



                                                                 this result as



                                                                 normal/abnormal

.



[facility] H[id number] 19:58:00





             Test Item    Value        Reference Range Interpretation Comments

 

             Basophils # (test code 0.1          See_Comment                [Aut

omated message] The



             = Basophils #)                                        system which 

generated



                                                                 this result tra

nsmitted



                                                                 reference range

: <=0.2.



                                                                 The reference r

tatianna was



                                                                 not used to int

erpret



                                                                 this result as



                                                                 normal/abnormal

.



[id number] 19:58:00





             Test Item    Value        Reference Range Interpretation Comments

 

             Eosinophils (test code = 2.6          See_Comment                [A

utomated message] The



             Eosinophils)                                        system which ge

nerated



                                                                 this result tra

nsmitted



                                                                 reference range

: <=4.0.



                                                                 The reference r

tatianna was



                                                                 not used to int

erpret



                                                                 this result as



                                                                 normal/abnormal

.



[facility] HospitalKaxsajxDNEYTLWPNK1604-89-33 19:58:00





             Test Item    Value        Reference Range Interpretation Comments

 

             Monocytes (test code = Monocytes) 9.6          2.0-12.0            

      



[facility] Hospit[id number] 19:58:00





             Test Item    Value        Reference Range Interpretation Comments

 

             Lymphocytes (test code = Lymphocytes) 21.6         20.0-40.0       

          



[facility] HospitalFurecwkIBWHKCABGW8892-94-59 19:58:00





             Test Item    Value        Reference Range Interpretation Comments

 

             Segs (test code = Segs) 65.1         45.0-75.0                 



[facility] HospitalJintempGVMAPKFCBF2502-38-34 19:58:00





             Test Item    Value        Reference Range Interpretation Comments

 

             MPV (test code = MPV) 7.6          7.4-10.4                  



[facility] HospitalWdmgewoKXCCZNHJQT1087-80-46 19:58:00





             Test Item    Value        Reference Range Interpretation Comments

 

             RDW (test code = RDW) 13.7         11.5-14.5                 



[facility] HospitalY[id number] 19:58:00





             Test Item    Value        Reference Range Interpretation Comments

 

             Platelet (test code = Platelet) 343          133-450               

    



[facility] HospitalHpmakutBJGGABDVJM0831-33-48 19:58:00





             Test Item    Value        Reference Range Interpretation Comments

 

             WBC (test code = WBC) 8.6          3.7-10.4                  



[facility] HospitalQsjnuewNMAIEPBCDW7103-29-53 19:58:00





             Test Item    Value        Reference Range Interpretation Comments

 

             Hgb (test code = Hgb) 12.8         12.0-16.0                 



[facility] Hospit[id number] 19:58:00





             Test Item    Value        Reference Range Interpretation Comments

 

             RBC (test code = RBC) 4.53         4.20-5.40                 



[facility] Hospit[id number] 19:58:00





             Test Item    Value        Reference Range Interpretation Comments

 

             MCV (test code = MCV) 87.8         80.0-98.0                 



[facility] HospitalOefzloaDYBEIQUOAN4937-15-58 19:58:00





             Test Item    Value        Reference Range Interpretation Comments

 

             Hct (test code = Hct) 39.7         36.0-48.0                 



[facility] HospitalWvpuduvGKHDGIUVZT4158-91-73 19:58:00





             Test Item    Value        Reference Range Interpretation Comments

 

             MCHC (test code = MCHC) 32.1         32.0-36.0                 



[facility] HospitalV[id number] 19:58:00





             Test Item    Value        Reference Range Interpretation Comments

 

             MCH (test code = MCH) 28.2 pg      27.0-31.0                 



[facility]



History and Physical Notes







                Date/Time       Note            Provider        Source

 

                2022-10-20      Drew Monroy MD: PERFORM, SIGN, VERI

FYYuridiaent                 



                21:17:00-0      Display: History and PhysicalAuthored Date:     

            



                0:00            [id number] of Present IllnessDat

e of                 



                                admission: 10/14/2022Reason for admission:      

           



                                [facility] is an 82 y/o woman with mi

ld                 



                                aortic stenosis, hypertension, asthma who is madhav

ng                 



                                admitted at the request of the her primary pulmo

nary                 



                                doctor for evaluation for possible pneumonia. Wil green                 



                                reports having pain in the right 'shoulder blade

' where                 



                                the 'mass is'. She is also complaining of LE maliha

ma that                 



                                has progressed during this time period. She repo

rts also                 



                                having a 'mass' in the 'right front lung'. Last 

week she                 



                                has fever of 100.X. NO cough reported or any malia

st pain                 



                                at this time. Has been on Augmentin, as outpatie

nt, this                 



                                has not helped with symptoms.Patient reports sig

nificant                 



                                weight loss from 118 to about 97 now unintention

ally.                 



                                Denies any blood in stool. Past colonoscopy was 

normal.                 



                                Reports having distant history of tobacco in iggy

rly 48                 



                                years.Review of SystemsCardiovascular:          

       



                                Negative.Gastrointestinal: Negative.Health      

           



                                StatusAllergies:Allergic Reactions (Selected)Sev

erity                 



                                Not DocumentedAlbuterol- No reactions were      

           



                                documented.Augmentin- No reactions were document

ed.Dust-                 



                                No reactions were documented.Iodine- No reaction

s were                 



                                documented.Levaquin- No reactions were documente

d.Sulfa                 



                                drugs- No reactions were documented.,Allergies (

6)                 



                                Active Severity Reactionalbuterol None          

       



                                DocumentedAugmentin  None DocumentedDust None   

              



                                Documentediodine None DocumentedLevaquin None   

              



                                Documentedsulfa drugs None DocumentedCurrent    

             



                                medications: (Selected)Inpatient                

 



                                MedicationsOrderedAtivan: 0.5 mg, IV, Q6H, PRN: 

                



                                AgitationDextrose 50% Syringe (D50W): 25 mL, IVP

, PRN,                 



                                PRN: Blood Glucose ResultsDextrose 50% Syringe (

D50W):                 



                                50 mL, IVP, PRN, PRN: Blood Glucose ResultsLoven

ox: 40                 



                                mg, SUB-Q, [id number]: 1 gm, IVPB,        

         



                                TZLQ12Pdddrbubbrmfkd: 650 mg, PO, Q4H, PRN: Pain

                 



                                1-3/Temp > 100.4 Fazithromycin: 500 mg, IVPB,   

              



                                Dailydocusate: 100 mg, PO, BIDglucagon: 1 mg, IM

, PRN,                 



                                PRN: Blood Glucose Resultsnormal saline 0.9% IV 

1,000                 



                                mL: 75 ml/hr, IV, Stop: 22 14:41:00       

          



                                CSTondansetron: 4 mg, IVP, Q8H, PRN: Nausea and 

                



                                VomitingpredniSONE: 40 mg, PO,                 



                                DailyPrescriptionsPrescribedXopenex HFA 45 mcg/i

nh                 



                                inhalation aerosol with adapter: 45 microgram, 1

 puff,                 



                                INHALATION, Q6H, PRN: Cough, wheezing, shortness

 of                 



                                breath, 1 ea, 0 Refill(s)levalbuterol 0.63 mg/3 

mL                 



                                inhalation solution: 0.63 mg, 3 mL, NEB, PRN, [place]

N:                 



                                Respiratory Pathway, 180 inhalation, 0          

       



                                Refill(s)Documented MedicationsDocumentedVitamin

 B12                 



                                Methylcobalamin: 5000 mcg, SL, Daily, 0         

        



                                Refill(s)aspirin: 81 mg, PO, Daily, 0           

      



                                Refill(s)clonazePAM 100 microgram/ml cpmd oral  

               



                                suspension: 0.5 mg, 1 tab, PO, TID, PRN: Anxiety

, 0                 



                                Refill(s)magnesium citrate: 800 mg, PO, TID, 0  

               



                                Refill(s)multivitamin: otc, PO, Daily, 0 Refill(

s),Home                 



                                Medications (7) Activeaspirin 81 mg, PO, Dailycl

onazePAM                 



                                100 microgram/ml cpmd oral suspension 0.5 mg = 1

 tab,                 



                                PRN, PO, TIDlevalbuterol 0.63 mg/3 mL inhalation

                 



                                solution 0.63 mg = 3 mL, PRN, NEB, PRNmagnesium 

citrate                 



                                800 mg, PO, TIDmultivitamin otc, PO, DailyVitami

n B12                 



                                Methylcobalamin 5000 mcg, SL, DailyXopenex HFA 4

5                 



                                mcg/inh inhalation aerosol with adapter 45 micro

gram = 1                 



                                puff, PRN, INHALATION, [id number] list:All Probl

emsNo                 



                                Chronic Problems / Cerner NKPHistoriesPast Medic

al                 



                                History:ResolvedAsthma ([id number]): Resolved.Bra

in stem                 



                                stroke ([id number]): Resolved.Shortness of breat

h                 



                                ([id number]):         

        



                                Resolved.Depression (538221973): Resolved.Cancer

 of skin                 



                                ([id number]): Resolved

.Family                 



                                History:Breast                  



                                cancerSisterTIAMotherStrokeFatherMotherBrotherPr

ocedure                 



                                history:Polypectomy (614300385).Lymph node opera

tion                 



                                (567369710).Uterine suspension without shortenin

g of                 



                                round ligaments or sacrouterine ligaments       

          



                                (310800190).Physical Examination***Impression an

d                 



                                PlanAcute issues:*Lung mass, likely malignancy*P

ossible                 



                                community acquired pneumonia*Aortic             

    



                                stenosis*HTN*Asthma*Tupftyt92/--Admit to 

                



                                inpatient--Check CBC an BMP, check tumor        

         



                                markers--Reported history of iodine is only anxi

ety per                 



                                the patient--take clonazepam at home, will leave

 PRN                 



                                Ativan--Rocephin/Azithromycin--Check Influenza, 

other                 



                                viral studies, strep's urine antigen. Low suspic

ion of                 



                                COVID (history of COVID few months ago).--CT malia

st abd                 



                                pelvis (after one dose of prednisone) ***pt repo

rts no                 



                                true allergy to Iodine, reports that its mostly 

                



                                anxiety***--Pulmonary consult--IVF and check nancy

al                 



                                function.DVT px: Lovenox.Drew Monroy MDElectronically Signed: 10/14/22 14:47         

        







Notes







                Date/Time       Note            Provider        Source

 

                2023      [id number] [facility]



                09:20:00-00:00   [address] [place] [address]  

               



                                                                



                                                                



                                PATIENT NAME: ROBBY MCKNIGHT ADMIT DATE:                  



                                ACCOUNT NO: [id number] ROOM NO: [facility]        

         



                                MEDICAL RECORD NO: [id number] AGE: 82           

      



                                REPORT TYPE: eELECTROCARDIOGRAM REPORT SEX: F   

              



                                                                



                                ADMITTING PHYSICIAN:Tapan Herndon MD            

     



                                ATTENDING PHYSICIAN:Tapan Herndon MD            

     



                                                                



                                                                



                                Order:                          



                                [id number]                   



                                Test Reason : POST WATCHMAN                 



                                 Test Date/Time Stamp:                 



                                 09:20:56                 



                                Blood Pressure : ***/*** mmHG                 



                                Vent. Rate : 059 BPM Atrial Rate : 059 BPM      

           



                                 P-R Int : 194 ms QRS Dur : 088 ms              

   



                                 QT Int : 402 ms P-R-T Axes : 051 085 021 degree

s                 



                                 QTc Int : 397 ms                 



                                                                



                                Sinus bradycardia with premature atrial complexe

s                 



                                Voltage criteria for left ventricular hypertroph

y                 



                                Abnormal ECG                    



                                When compared with ECG of 2023 12:28,    

             



                                Significant changes have occurred               

  



                                Confirmed by RAVI MEDLEY MD () on 2023

 9:50:42 PM                 



                                                                



                                Referred By: Tapan Herndon Confirmed by:RAVI CLEMENT MD                 



                                                                



                                                                



                                                                



                                                                



                                                                



                                Electronically Signed by Ravi Medley MD on  at 2150                 



                                                                



                                                                



                                                                



                                                                



                                                                



                                                                



                                                                



                                                                



                                                                



                                                                



                                                                



                                                                



                                                                



                                                                



                                                                



                                                                



                                                                



                                                                



                                PATIENT NAME: ROBBY MCKNIGHT ACCOUNT #: [id number]

[id number]                 

 

                2023                                      [facility]



                18:16:00-00:00  [facility]       

          



                                Consultation Note - Brief                 



                                REPORT#:[id number] REPORT STATUS: Signed         

        



                                DATE:23 TIME:                  



                                                                



                                PATIENT: ROBBY MCKNIGHT UNIT #: [id number]    

             



                                ACCOUNT#: [id number]  ROOM/BED:               

  



                                : 38 AGE: 82 SEX: F ATTEND: Nikky Herndon MD                 



                                ADM DT: 23 AUTHOR: Nilo Lynn MD   

              



                                                                



                                * ALL edits or amendments must be made on the First To File/computer document *                 



                                                                



                                                                



                                History of Present Illness                 



                                                                



                                HPI                             



                                Requesting Clinician: Dr. Herndon                

 



                                Reason for consult:                 



                                Patient here today for elective preop and shared

 decision for watchman                 



                                insertion.                      



                                Chief complaint:                 



                                None                            



                                PCP:                            



                                PCP: Travis Schmid MD                 



                                                                



                                History of present illness:                 



                                                    82-year-old woman with past 

medical history of atrial fibrillation, 

hypertension                                        



                                , dyslipidemia, CABG, obstructive sleep apnea, P

lexiPulse pacemaker placement                 



                                presented today for elective preop evaluation fo

r watchman insertion.                 



                                                                



                                History - Adult longitudinal                 



                                Past medical history:                 



                                Reports: Atrial fibrillation, Coronary artery di

sease, Hypertension,                 



                                Dyslipidemia, Ischemic stroke.                 



                                Additional medical history:                 



                                Brain stem stroke                 



                                 multiple TIAs                  



                                Past surgical history:                 



                                Reports: CABG, Pacemaker.                 



                                Alcohol use: Denies EtOH use                 



                                Drug use: Denies recreational drugs             

    



                                Smoking status for patients 13 years old or olde

r: Former Smoker                 



                                Other social history: Local resident            

     



                                Allergies:                      



                                Coded Allergies:                 



                                levofloxacin (From LEVAQUIN) (Severe, MUSCLE TWI

TCHING 17)                 



                                Sulfa (Sulfonamide Antibiotics) (Mild, NAUSEA )                 



                                iodine (Mild, tremors 12)                 



                                                                



                                Unable to obtain: family history                

 



                                                                



                                Brief Consult Note                 



                                                                



                                Physical Exam                   



                                Vitals:                         



                                Blood pressure 110/60, pulse of 69, respiratory 

rate of 12, temperature 98.6                 



                                General appearance: alert, awake, oriented      

           



                                HEENT: anicteric                 



                                Neck: full range of motion, no bruit/NL carotids

, no JVD                 



                                Cardiovascular: regular rate rhythm, normal hear

t sounds                 



                                        Respiratory: clear to auscultation, no d

istress, no tenderness, aerating well, 

                                        



                                symmetric expansion                 



                                Abdomen: soft, non-tender, no guarding, no rebou

nd                 



                                Genitourinary: not indicated, deferred          

       



                                Neuro/CNS: alert, oriented X 3, normal speech, n

o motor deficits                 



                                Extremities:                    



                                Bilat moves all, Bilat no edema                 



                                Findings/Data:                  



                                Recent Impressions:                 



                                RADIOLOGY - XR CHEST 2 V  1155             

    



                                *** Report Impression - Status: SIGNED Entered: 

2023 1426                 



                                                                



                                IMPRESSION:                     



                                No acute cardiopulmonary findings               

  



                                Impression By: [email or web address] Natalie Jin D.O.

                 



                                                                



                                Laboratory Tests                 



                                                                



                                23 1147:                  



                                [Embedded Image Not Available]                 



                                                                



                                                                



                                Free Text A P:                  



                                                    82-year-old woman here today

 for watchman preop evaluation and shared decision.

                                                    



                                                                



                                 Patient meets criteria for watchman insertion  

               



                                 CHADS2 vascular score is 4                 



                                 Has bled score is 2                 



                                 Patient filled NICE questionnaire              

   



                                                                



                                Electronically Signed by Nilo Lynn MD on

 23 at [id number]                 



                                                                



                                RPT #:[id number]                 



                                ***END OF REPORT***                 

 

                2023      [id number] [facility]



                12:28:00-00:00   [address] [place] [address]  

               



                                                                



                                                                



                                PATIENT NAME: ROBBY MCKNIGHT ADMIT DATE:                  



                                ACCOUNT NO: [id number] ROOM NO: [facility]        

         



                                MEDICAL RECORD NO: [id number] AGE: 82           

      



                                REPORT TYPE: eELECTROCARDIOGRAM REPORT SEX: F   

              



                                                                



                                ADMITTING PHYSICIAN:Tapan Herndon MD            

     



                                ATTENDING PHYSICIAN:Tapan Herndon MD            

     



                                                                



                                                                



                                Order:                          



                                [id number]                   



                                Test Reason : PREOP                 



                                 Test Date/Time Stamp:                 



                                 12:28:56                 



                                Blood Pressure : ***/*** mmHG                 



                                Vent. Rate : 062 BPM Atrial Rate : 062 BPM      

           



                                 P-R Int : 206 ms QRS Dur : 084 ms              

   



                                 QT Int : 420 ms P-R-T Axes : 066 061 043 degree

s                 



                                 QTc Int : 426 ms                 



                                                                



                                Normal sinus rhythm                 



                                Possible Left atrial enlargement                

 



                                Left ventricular hypertrophy                 



                                Abnormal ECG                    



                                When compared with ECG of 20-MAR-2012 08:10,    

             



                                No significant change was found                 



                                Confirmed by SATURNINO MEDLEY ([id number]) on 2023 8:

53:42 AM                 



                                                                



                                Referred By: Tapan Herndon Confirmed by:SATURNINO WINTER

[facility]                 



                                                                



                                                                



                                                                



                                                                



                                                                



                                Electronically Signed by Saturnino Medley MD on  at 0803                 



                                                                



                                                                



                                                                



                                                                



                                                                



                                                                



                                                                



                                                                



                                                                



                                                                



                                                                



                                                                



                                                                



                                                                



                                                                



                                                                



                                                                



                                PATIENT NAME: ROBBY MCKNIGHT ACCOUNT #: [id number]

[id number]                 

 

                2023      Radiation Dose CTDIVOL = 0 (mGy): DLP = 116.22 (

mGy-cm)                 [facility]



                14:37:00-00:00  PROCEDURE INFORMATION:                 



                                Exam: CT Chest Without Contrast; Diagnostic     

            



                                Exam date and time: 3/9/2023 3:41 PM            

     



                                Age: 82 years old                 



                                Clinical indication: /r91.1                 



                                TECHNIQUE:                      



                                                    Imaging protocol: Diagnostic

 computed tomography of the chest without contrast.

                                                    



                                                    Radiation optimization: All 

CT scans at this facility use at least one of these

                                                    



                                dose optimization techniques: automated exposure

 control; mA and/or kV                 



                                adjustment per patient size (includes targeted e

xams where dose is matched to                 



                                clinical indication); or iterative reconstructio

n.                 



                                REPORTING DATA:                 



                                        Count of CT and Cardiac NM exams in prio

r 12 months: This patient has received 

                                        



                                                    2 known CTs and 0 known card

iac nuclear medicine studies in the 12 months prior

                                                    



                                to the current study.                 



                                COMPARISON:                     



                                CHEST ABDOMEN PELVIS W CONTRAST CT 10/14/2022 11

:37 PM, CT lung biopsy                 



                                10/19/2022                      



                                RADIATION DOSE METRICS:                 



                                Total DLP (mGy-cm): 116.22                 



                                FINDINGS:                       



                                                    Lungs: Virtual resolution of

 multiple confluent bilateral pulmonary infiltrates

                                                    



                                noted previously, with residual linear scarring 

in each lobe. No mass or                 



                                endobronchial obstructing lesion. No bronchiecta

sis.                 



                                Pleural spaces: No pleural effusion.            

     



                                Heart: Cardiomegaly, moderate coronary artery ca

lcifications. No pericardial                 



                                effusion.                       



                                Lymph nodes: No gross adenopathy.               

  



                                Vasculature: Ectatic aorta with aneurysmal dilat

ation of the ascending aorta,                 



                                diameter approximately 4.4 cm, unchanged.       

          



                                        Intraperitoneal space: Unenhanced upper 

abdominal images grossly unremarkable. 

                                        



                                Bones/joints: Kyphosis, dextroscoliosis, moderat

e spondylosis and multilevel                 



                                degenerative disc disease.                 



                                Soft tissues: Unremarkable.                 



                                IMPRESSION:                     



                                1. Virtual complete resolution of extensive conf

luent bilateral pulmonary                 



                                infiltrates noted previously, with residual line

ar scarring.                 



                                2. Aneurysmal dilatation ascending aorta, diamet

er approximately 4.4 cm,                 



                                unchanged.                      



                                3. Cardiomegaly, moderate coronary artery calcif

ications.                 



                                4. Dextroscoliosis with extensive spondylosis an

d multilevel degenerative                 



                                disc disease.                   



                                Jose Dempsey MD On 03/10/2023 15:10:59; ARIELLE

[id number]                 

 

                2022      PROCEDURE INFORMATION:                 DORY rodríguez



                14:18:00-00:00  Exam: XR Chest                  



                                Exam date and time: 2022 2:06 PM           

      



                                Age: 81 years old                 



                                Clinical indication: Shortness of breath; Additi

onal info: /r06.02                 



                                TECHNIQUE:                      



                                Imaging protocol: Radiologic exam of the chest. 

                



                                Views: 2 views. PA and Lateral                 



                                COMPARISON:                     



                                CHEST 1VIEW DX 10/19/2022 7:00 PM               

  



                                FINDINGS:                       



                                Tubes, catheters and devices: Surgical clips ove

rlie the right axilla region.                 



                                Lungs: Pulmonary emphysema identified within the

 lungs. Linear density                 



                                identified within the bilateral perihilar lungs.

 Linear densities extend into                 



                                the upper lobes, worse on the right side. Bilate

ral interstitial and alveolar                 



                                opacities demonstrate improvement since 2022.                 



                                Pleural spaces: Pleural and lung parenchymal sca

rring identified within right                 



                                upper chest. No other pleural effusion or pneumo

thorax identified.                 



                                Heart/Mediastinum: Cardiac silhouette appears mi

[id number] enlarged.                 



                                                    Vasculature: Mild-to-moderat

e atherosclerotic calcification demonstrated within

                                                    



                                the aorta. Tortuous and ectatic aorta is demonst

rated.                 



                                        Bones/joints: Dextroscoliosis of the tho

racic spine is demonstrated. Diffusely 

                                        



                                decreased bone density. Generalized bony degener

ative changes.                 



                                IMPRESSION:                     



                                1. Mild-to-moderately enlarged cardiac silhouett

e.                 



                                2. Pulmonary emphysema.                 



                                3. Linear bilateral perihilar and upper lobe pul

monary atelectasis, scarring.                 



                                Right greater than left.                 



                                                    4. Improvement of bilateral 

pneumonia versus pulmonary edema since prior study.

                                                    



                                5. Chronic findings.                 



                                Radha Solomon MD On 11/10/2022 07:27:02; VR-

[id number]                 

 

                2022-10-19      PROCEDURE INFORMATION:                 DORY rodríguez



                19:05:00-00:00  Exam: XR Chest                  



                                Exam date and time: 10/19/2022 7:00 PM          

       



                                Age: 81 years old                 



                                Clinical indication: Shortness of breath; Additi

onal info: Shortness of                 



                                breath/sob, chest heaviness                 



                                TECHNIQUE:                      



                                Imaging protocol: Radiologic exam of the chest. 

                



                                Views: 1 view.                  



                                COMPARISON:                     



                                CHEST 1VIEW DX 10/19/2022 2:20 PM               

  



                                FINDINGS:                       



                                Lungs: Lungs are adequately expanded. There is m

ild to moderate streaky                 



                                increased density in the right upper lung zone, 

mildly diminished now as                 



                                compared to prior mild hazy increased density is

 present in the left upper                 



                                lobe, unchanged.                 



                                Pleural spaces: Unremarkable. No pleural effusio

n. No pneumothorax.                 



                                                    Heart/Mediastinum: There is 

moderate enlargement of the cardiac silhouette with

                                                    



                                tortuosity and calcification of the thoracic aor

ta.                 



                                Bones/joints: Mild-to-moderate S-shaped thoracol

umbar scoliosis..                 



                                Soft tissues: Surgical clips are present in the 

right axilla.                 



                                IMPRESSION:                     



                                There are streaky increased densities in the upp

er lung zones, greater on the                 



                                right than the left, with mild improvement in th

e right lung zone now as                 



                                compared prior. Findings are otherwise stable.  

               



                                Allyssa Cabrera MD On 10/19/2022 19:54:3

2; [id number]                 

 

                2022-10-19      PROCEDURE INFORMATION:                  Hao

ast



                14:21:00-00:00  Exam: XR Chest                  



                                Exam date and time: 10/19/2022 2:20 PM          

       



                                Age: 81 years old                 



                                Clinical indication: /mass, post lung biopsy pro

cedure                 



                                TECHNIQUE:                      



                                Imaging protocol: Radiologic exam of the chest. 

                



                                Views: 1 view.                  



                                COMPARISON:                     



                                1. CHEST ABDOMEN PELVIS W CONTRAST CT 10/14/2022

 11:37 PM                 



                                2. CHEST 1VIEW DX 10/8/2022 12:25 PM            

     



                                FINDINGS:                       



                                Lungs: Stable bilateral consolidative opacities 

more severe on the right.                 



                                Pleural spaces: No pneumothorax or pleural effus

ion.                 



                                Heart/Mediastinum: Stable enlarged of the cardia

c silhouette.                 



                                Bones/joints: Dextroscoliosis of the thoracic sp

ine.                 



                                Soft tissues: Right axillary surgical clips.    

             



                                IMPRESSION:                     



                                1. No pneumothorax.                 



                                2. Stable bilateral consolidative opacities.    

             



                                Ailyn Mock MD On 10/19/2022 15:35:56

; [id number]                 

 

                2022-10-19      PROCEDURE INFORMATION:                  Maria Guadalupe

ast



                10:59:06-00:00  Exam: IR Percutaneous Biopsy Of The Lung Or Medi

astinum                 



                                Exam date and time: 10/19/2022 9:56 AM          

       



                                Age: 81 years old                 



                                Clinical indication: Biops/suspected bronchoalve

olar cell carcinoma                 



                                PROCEDURE:                      



                                1. Percutaneous biopsy of a right lower lobe milli

g mass using CT guidance                 



                                2. Moderate Sedation                 



                                CLINICAL INDICATION: 81-year-old female with mul

tiple bilateral lung                 



                                masses/consolidations                 



                                COMPARISON: CT chest on 10/14/2022              

   



                                CONSENT: The procedure, risks, benefits, and alt

ernatives were discussed with                 



                                the patient and written informed consent was obt

ained. A 'timeout' performed                 



                                per protocol prior to the procedure.            

     



                                TECHNIQUE:                      



                                CT imaging performed at this location utilizes r

adiation dose optimization                 



                                techniques which include one or more of the foll

owing: Automated exposure                 



                                control, adjustment of the mA and/or kV accordin

g to patient size, use of                 



                                iterative reconstruction technique.             

    



                                : Dr. Bautista               

  



                                Preoperative diagnosis: Right lower lobe lung ma

ss                 



                                Postoperative diagnosis: same                 



                                Total radiation dose: 448.05 mGycm              

   



                                Estimated blood loss: Less than 10 cc           

      



                                Moderate sedation: I supervised moderate sedatio

n during this procedure.                 



                                Patient was continuously monitored by a nurse us

ing automated blood pressure,                 



                                electrocardiogram, and pulse oximetry. The moder

ate sedation record is                 



                                        permanently stored in the hospital [employer] system. The personal supervised 

                                        



                                                    moderate sedation time was 1

6 minutes. Medications administered: Versed 2 mg IV

                                                    



                                and Fentanyl 100 mcg IV.                 



                                                    The patient was placed in a 

prone position on the CT table. Preprocedure CT was

                                                    



                                        used to demarcate the right lower lobe m

ass/consolidation. The posterior right 

                                        



                                chest wall of the patient was prepped and draped

 using sterile technique. The                 



                                overlying skin was anesthetized with 1% lidocain

e.                 



                                Using CT guidance, a 17-gauge introducer needle 

was advanced into the right                 



                                lower lobe mass/consolidation. Subsequently, 4 c

ore biopsies (18-gauge x 2cm)                 



                                were obtained of this mass using a coaxial techn

ique. Samples were sent for                 



                                both histopathology and microbial analysis. The 

needles were removed at the                 



                                end of the procedure and sterile dressings appli

ed.                 



                                Postprocedure imaging demonstrated no immediate 

complication. The patient was                 



                                stable throughout the procedure.                

 



                                IMPRESSION:                     



                                Successful percutaneous biopsy of a right lower 

lobe lung mass using CT                 



                                guidance.                       



                                Rebel Bautista MD On 10/19/2022 16:42:59; 

[id number]                 

 

                2022-10-14      Radiation Dose CTDIVOL = 0 (mGy): DLP = 150.01 (

mGy-cm)                 [facility]



                23:25:34-00:00  PROCEDURE INFORMATION:                 



                                Exam: CT Chest With Contrast; Diagnostic        

         



                                Exam date and time: 10/14/2022 11:34 PM         

        



                                Age: 81 years old                 



                                                    Clinical indication: Patient

 HX: /shortness of breath, right lung mass, concern

                                                    



                                for metastatic disease. ; Additional info: /shor

tness of breath, right lung                 



                                mass, concern for metastatic disease.           

      



                                TECHNIQUE:                      



                                Imaging protocol: Diagnostic computed tomography

 of the chest with contrast.                 



                                                    Radiation optimization: All 

CT scans at this facility use at least one of these

                                                    



                                dose optimization techniques: automated exposure

 control; mA and/or kV                 



                                adjustment per patient size (includes targeted e

xams where dose is matched to                 



                                clinical indication); or iterative reconstructio

n.                 



                                Contrast material: OMNI; Contrast volume: 75 ml;

 Contrast route: INTRAVENOUS                 



                                (IV);                           



                                COMPARISON:                     



                                CHEST WO CONTRAST CT 10/8/2022 3:48 PM          

       



                                RADIATION DOSE METRICS:                 



                                Total DLP (mGy-cm): 150.01                 



                                FINDINGS:                       



                                Lungs: Confluent, multifocal opacities preferent

ially involving the superior                 



                                segment right lower lobe and apical region of th

e right upper lobe.                 



                                Pleural spaces: Unremarkable. No pneumothorax. N

o pleural effusion.                 



                                Heart: Unremarkable. No cardiomegaly. No pericar

dial effusion.                 



                                Lymph nodes: No pathologic lymphadenopathy in th

e chest by CT size criteria.                 



                                                    Vasculature: Ectatic ascendi

ng thoracic aorta measuring 4.4 x 4.3 cm. No aortic

                                                    



                                dissection.                     



                                Bones/joints: Bones are osteopenic. No discrete 

lytic or blastic osseous                 



                                lesions within the limitations of the extent of 

osteopenia.                 



                                Soft tissues: Unremarkable.                 



                                                                



                                =========================                 



                                PROCEDURE INFORMATION:                 



                                Exam: CT Abdomen And Pelvis With Contrast       

          



                                Exam date and time: 10/14/2022 11:34 PM         

        



                                Age: 81 years old                 



                                                    Clinical indication: Patient

 HX: /shortness of breath, right lung mass, concern

                                                    



                                for metastatic disease. ; Additional info: /shor

tness of breath, right lung                 



                                mass, concern for metastatic disease.           

      



                                TECHNIQUE:                      



                                        Imaging protocol: Computed tomography of

 the abdomen and pelvis with contrast. 

                                        



                                                    Radiation optimization: All 

CT scans at this facility use at least one of these

                                                    



                                dose optimization techniques: automated exposure

 control; mA and/or kV                 



                                adjustment per patient size (includes targeted e

xams where dose is matched to                 



                                clinical indication); or iterative reconstructio

n.                 



                                Contrast material: OMNI; Contrast volume: 75 ml;

 Contrast route: INTRAVENOUS                 



                                (IV);                           



                                COMPARISON:                     



                                ABDOMEN/PELVIS WO IV CONTRAST CT 3/21/2018 10:55

 PM                 



                                RADIATION DOSE METRICS:                 



                                Total DLP (mGy-cm): 150.01                 



                                FINDINGS:                       



                                Liver: Normal. No mass.                 



                                Gallbladder and bile ducts: Normal. No calcified

 stones. No ductal dilation.                 



                                Pancreas: Normal. No ductal dilation.           

      



                                Spleen: Normal. No splenomegaly.                

 



                                Adrenal glands: Normal. No mass.                

 



                                Kidneys and ureters: Normal. No hydronephrosis. 

                



                                Stomach and bowel: Large volume stool burden thr

oughout the colon suggesting                 



                                constipation.                   



                                Appendix: Appendix not clearly visualized.      

           



                                Intraperitoneal space: Unremarkable. No free air

. No significant fluid                 



                                collection.                     



                                Vasculature: Mild aortoiliac calcific atheroscle

rotic disease without                 



                                aneurysmal dilatation.                 



                                Lymph nodes: Unremarkable. No enlarged lymph nod

es.                 



                                Urinary bladder: No wall thickening.            

     



                                Reproductive: Unremarkable as visualized.       

          



                                Bones/joints: Bones are osteopenic. No discrete 

lytic or blastic osseous                 



                                lesions within the limitations of the extent of 

osteopenia. Multilevel lumbar                 



                                spine degenerative change.                 



                                Soft tissues: No acute findings.                

 



                                =========================                 



                                IMPRESSION:                     



                                CT Chest With Contrast; Diagnostic              

   



                                1. Multifocal confluent opacities of the bilater

al lungs appear relatively                 



                                unchanged and are again favored represent pneumo

george. Malignant process cannot                 



                                be excluded and continued follow-up to ensure co

mplete resolution recommended                 



                                after appropriate therapy.                 



                                2. Ectatic ascending thoracic aorta.            

     



                                CT Abdomen And Pelvis With Contrast             

    



                                1. Large volume stool burden throughout the colo

n suggesting constipation.                 



                                2. No findings to suggest metastatic disease in 

the abdomen or pelvis.                 



                                Rico Espinoza MD On 10/15/2022 00:09:06; [id number]

[id number]                 

 

                2022-10-08      Radiation Dose CTDIVOL = 0 (mGy): DLP = 167.44 (

mGy-cm)                 [facility]



                16:02:13-00:00  PROCEDURE INFORMATION:                 



                                Exam: CT Chest Without Contrast; Diagnostic     

            



                                Exam date and time: 10/8/2022 3:48 PM           

      



                                Age: 81 years old                 



                                Clinical indication: /mass on cxr               

  



                                TECHNIQUE:                      



                                                    Imaging protocol: Diagnostic

 computed tomography of the chest without contrast.

                                                    



                                                    Radiation optimization: All 

CT scans at this facility use at least one of these

                                                    



                                dose optimization techniques: automated exposure

 control; mA and/or kV                 



                                adjustment per patient size (includes targeted e

xams where dose is matched to                 



                                clinical indication); or iterative reconstructio

n.                 



                                COMPARISON:                     



                                1. CHEST PULMONARY EMBOLISM CTA 3/2/2020 3:17 PM

                 



                                2. CHEST 1VIEW DX 10/8/2022 12:25 PM            

     



                                RADIATION DOSE METRICS:                 



                                Total DLP (mGy-cm): 167.44                 



                                FINDINGS:                       



                                        Lungs: Multifocal bilateral upper lobe g

round-glass infiltrates concerning for 

                                        



                                pneumonia. Follow-up to document resolution afte

r treatment is recommended to                 



                                help exclude a more aggressive process.         

        



                                Pleural spaces: Unremarkable. No pneumothorax. N

o pleural effusion.                 



                                Heart: Cardiomegaly with coronary artery calcifi

cations.                 



                                Mediastinal space: Air-filled esophagus secondar

y to dysmotility or reflux.                 



                                Lymph nodes: Unremarkable. No enlarged lymph nod

es.                 



                                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 

                



                                Bones/joints: Rightward thoracic curvature.     

            



                                Soft tissues: Beam hardening artifact from the p

atient's arms limits detail.                 



                                IMPRESSION:                     



                                1. Multifocal bilateral upper lobe ground-glass 

infiltrates concerning for                 



                                pneumonia. Follow-up to document resolution afte

r treatment is recommended to                 



                                help exclude a more aggressive process.         

        



                                2. 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           

      



                                3. Cardiomegaly with coronary artery calcificati

ons.                 



                                4. Air-filled esophagus secondary to dysmotility

 or reflux.                 



                                Antonio Melton MD On 10/08/2022 16:32:46; VR-MXL

[id number]                 

 

                2022-10-08      PROCEDURE INFORMATION:                 [facility]



                12:50:00-00:00  Exam: US Duplex Lower Extremity Veins, Bilateral

                 



                                Exam date and time: 10/8/2022 12:56 PM          

       



                                Age: 81 years old                 



                                Clinical indication: /lower extremity swelling  

               



                                TECHNIQUE:                      



                                                    Imaging protocol: Real-time 

Duplex ultrasound of the bilateral extremities with

                                                    



                                2-D gray scale, color Doppler flow and spectral 

waveform analysis with image                 



                                documentation. Complete exam focused on the bila

teral lower extremity veins.                 



                                COMPARISON:                     



                                EXT LOWER VENOUS DOPPLER BILAT US 3/2/2020 1:40 

PM                 



                                FINDINGS:                       



                                        Right deep veins: Unremarkable. The comm

on femoral, femoral, proximal profunda 

                                        



                                femoral and popliteal veins are patent without t

hrombus. Normal Doppler                 



                                waveforms. Normal compressibility and/or augment

ation response.                 



                                Right superficial veins: Saphenofemoral junction

 is patent without thrombus.                 



                                Left deep veins: Unremarkable. The common femora

l, femoral, proximal profunda                 



                                femoral and popliteal veins are patent without t

hrombus. Normal Doppler                 



                                waveforms. Normal compressibility and/or augment

ation response.                 



                                Left superficial veins: Saphenofemoral junction 

is patent without thrombus.                 



                                Soft tissues: Right popliteal fossa 7.8 x 1.6 x 

1.6 cm heterogeneous                 



                                echogenicity collection may represent Chadwick's cy

st.                 



                                IMPRESSION:                     



                                No evidence of deep vein thrombosis.            

     



                                Reece Bosch MD On 10/08/2022 13:37:51; [id number]

[id number]                 

 

                2022-10-08      PROCEDURE INFORMATION:                 [facility]



                12:42:55-00:00  Exam: XR Chest                  



                                Exam date and time: 10/8/2022 12:25 PM          

       



                                Age: 81 years old                 



                                Clinical indication: /chest pain                

 



                                TECHNIQUE:                      



                                Imaging protocol: Radiologic exam of the chest. 

                



                                Views: 1 view.                  



                                COMPARISON:                     



                                CHEST 1VIEW DX 10/3/2019 4:47 PM                

 



                                FINDINGS:                       



                                Lungs: Interval new right upper lobe masslike op

acity and probable cavitary                 



                                        area are present. Mild patchy left upper

 lobe interstitial opacity is present. 

                                        



                                Pleural spaces: No significant pleural effusion.

 No significant pneumothorax.                 



                                Heart/Mediastinum: Stable mildly enlarged cardia

c silhouette. Vasculature is                 



                                unremarkable.                   



                                Bones/joints: No radiographically evident of dis

placed rib fracture. Severe                 



                                dextroscoliosis.                 



                                IMPRESSION:                     



                                Interval new right upper lobe masslike opacity a

nd probable cavitary area are                 



                                        present. Differential includes pneumonia

 versus malignancy. Dedicated CT chest 

                                        



                                recommended for further evaluation.             

    



                                Hood Kamara MD On 10/08/2022 13:00:50; VR-CRM_

[id number]                 

 

                2020      Radiation Dose CTDIVOL = 0 (mGy): DLP = 337.05 (

mGy-cm)                 [facility]



                15:09:20-00:00  PROCEDURE INFORMATION:                 



                                Exam: CT Angiography Chest With Contrast        

         



                                Exam date and time: 3/2/2020 3:17 PM            

     



                                Age: 79 years old                 



                                Clinical indication: Pain; Patient HX: PT report

s chronic SOB x 1 yr , pins                 



                                needles to chest 'lungs feel irritated ', had nu

c med test thinks to R/O pe                 



                                sent here by Dr balderas after test; Additional inf

o: /abnl vq scan                 



                                TECHNIQUE:                      



                                Imaging protocol: Computed tomographic angiograp

hy of the chest with                 



                                intravenous contrast.                 



                                3D rendering: MIP and/or 3D reconstructed images

 were created by the                 



                                technologist.                   



                                Total DLP: 337.05 mGy-cm                 



                                                    Radiation optimization: All 

CT scans at this facility use at least one of these

                                                    



                                dose optimization techniques: automated exposure

 control; mA and/or kV                 



                                adjustment per patient size (includes targeted e

xams where dose is matched to                 



                                clinical indication); or iterative reconstructio

n.                 



                                Contrast material: OMNIPAQUE; Contrast volume: 1

00 ml; Contrast route: IV;                 



                                COMPARISON:                     



                                CHEST CTA 2018 12:03 PM                 



                                FINDINGS:                       



                                Pulmonary arteries: Normal. No pulmonary emboli.

                 



                                Aorta: The abdominal aorta is enlarged measuring

 4.5 x 4.2 cm which is                 



                                unchanged.                      



                                Lungs: Unremarkable. No consolidation. No masses

.                 



                                Pleural space: Unremarkable. No pneumothorax. No

 pleural effusion.                 



                                Heart: Unremarkable. No cardiomegaly. No pericar

dial effusion.                 



                                Lymph nodes: Unremarkable. No enlarged lymph nod

es.                 



                                Bones/joints: Unremarkable. No acute fracture.  

               



                                Soft tissues: Unremarkable.                 



                                IMPRESSION:                     



                                1. Stable ectasia/aneurysmal dilatation of the a

scending thoracic aorta.                 



                                2. No evidence of pulmonary embolism.           

      



                                3. No focal infiltrates or lymphadenopathy.     

            



                                Karsten Kumar MD On 2020 16:02:44; OMAR

[id number]                 

 

                2020      PROCEDURE INFORMATION:                  [facility]



                13:55:00-00:00  Exam: US Duplex Lower Extremity Veins           

      



                                Exam date and time: 3/2/2020 1:53 PM            

     



                                Age: 79 years old                 



                                Clinical indication: /eval for SOB and leg swell

ing                 



                                TECHNIQUE:                      



                                                    Imaging protocol: Real-time 

duplex ultrasound of the Lower Extremities with 2-D

                                                    



                                gray scale, color Doppler flow and spectral wave

form analysis with image                 



                                documentation. Complete exam focused on the bila

teral lower extremity veins.                 



                                COMPARISON:                     



                                No relevant prior studies available.            

     



                                FINDINGS:                       



                                Right deep veins: Unremarkable. The common femor

al, femoral, popliteal veins                 



                                        are patent without thrombus. Normal Dopp

ler waveforms. Normal compressibility. 

                                        



                                Right superficial veins: Saphenofemoral junction

 is patent without thrombus.                 



                                                    Left deep veins: Unremarkabl

e. The common femoral, femoral, popliteal veins are

                                                    



                                patent without thrombus. Normal Doppler waveform

s. Normal compressibility.                 



                                Left superficial veins: Saphenofemoral junction 

is patent without thrombus.                 



                                                                



                                Soft tissues: Unremarkable.                 



                                IMPRESSION:                     



                                No acute findings. No evidence of deep vein thro

mbosis.                 



                                Alin Casey MD On 2020 14:34:18; INDIRAOOD

[id number]                 

 

                2020      PROCEDURE INFORMATION:                 [facility]



                11:04:00-00:00  Exam: XR Chest, 1 View                 



                                Exam date and time: 3/2/2020 11:01 AM           

      



                                Age: 79 years old                 



                                                    Clinical indication: Shortne

ss of breath; Additional info: /r06.02 shortness of

                                                    



                                breath                          



                                TECHNIQUE:                      



                                Imaging protocol: XR of the chest               

  



                                Views: 1 view.                  



                                COMPARISON:                     



                                CHEST 1VIEW DX 10/3/2019 4:47 PM                

 



                                FINDINGS:                       



                                Lungs: No acute airspace consolidation.         

        



                                Pleural space: No pleural effusion. No pneumotho

rax.                 



                                Heart/Mediastinum: No cardiomegaly.             

    



                                Vasculature: The aorta is tortuous and demonstra

raymond atherosclerotic                 



                                calcifications.                 



                                Bones/joints: Stable scoliosis.                 



                                IMPRESSION:                     



                                No acute cardiopulmonary process.               

  



                                Ortega Arrieta MD On 2020 16:30:04; VR-SOPID

[id number]                 

 

                          2020                These findings were discusse

d with Maurice Balderas at 3/2/2020 11:35 

AM CST. The                                         [facility]



                10:30:00-00:00  patient is to be sent to the emergency room.    

             



                                Antonio Melton MD On 2020 11:35:34; VR-SER

                 



                                 PROCEDURE INFORMATION:                 



                                Exam: NM Lung Ventilation and Perfusion Imaging 

                



                                Exam date and time: 3/2/2020 10:59 AM           

      



                                Age: 79 years old                 



                                                    Clinical indication: Shortne

ss of breath; Additional info: /r06.02 shortness of

                                                    



                                breath                          



                                TECHNIQUE:                      



                                Imaging protocol: Nuclear pulmonary ventilation 

with aerosol or gas was                 



                                performed followed by perfusion.                

 



                                        Views: Ventilation acquired with multipl

e projections. Perfusion acquired with 

                                        



                                multiple projections.                 



                                Radiopharmaceutical: 11.2 mCi xenon 133, inhalat

ion. 6.5 mCi Tc 99m MAA right                 



                                antecubital injection site.                 



                                COMPARISON:                     



                                CHEST 1VIEW DX 10/3/2019 4:47 PM                

 



                                FINDINGS:                       



                                        There is increased left lower lung zone 

perfusion defect relative to the right 

                                        



                                        lower lung zone. There is mild retention

 of activity on the delayed sequences. 

                                        



                                IMPRESSION:                     



                                Intermediate probability for pulmonary embolus. 

                



                                Antonio Melton MD On 2020 11:29:54; VR-SER

[id number]                 

 

                2019      PROCEDURE INFORMATION:                  [facility] monet



                11:28:00-00:00  Exam: MR Head Without Contrast                 



                                Exam date and time: 2019 11:36 AM         

        



                                Age: 78 years old                 



                                                    Clinical history: Other amne

evie; Additional info: R41.3 other amnesia/neuro rad

                                                    



                                TECHNIQUE:                      



                                Imaging protocol: MR of the head without contras

t.                 



                                3D rendering: MIP reconstructed images were crea

nic and reviewed.                 



                                COMPARISON:                     



                                BRAIN WO CONTRAST CT 3/21/2018 4:54 PM          

       



                                FINDINGS:                       



                                Brain: Significant periventricular and subcortic

al areas of gliosis of the                 



                                supratentorial brain is present. There is no acu

te cortical infarct,                 



                                parenchymal hemorrhage or an intra-axial mass. T

here is minimal to moderate                 



                                central and cortical atrophy. Hemosiderin deposi

tion is present along with                 



                                suspected calcification of 8 mm in the right cer

ebellum likely representing a                 



                                cavernous angioma.                 



                                Ventricles: There is a cavum septum pellucidum a

nd vergae. There is no                 



                                obstructive hydrocephalus. Ventriculomegaly seco

ndary to central atrophy.                 



                                Bones/joints: Unremarkable.                 



                                Soft tissues: Unremarkable.                 



                                Sinuses: Normal as visualized. No acute sinusiti

s.                 



                                Mastoid air cells: Normal as visualized. No mast

oid effusion.                 



                                Orbits: The orbital lenses have been resected. T

here is no retrobulbar                 



                                abnormality.                    



                                Sella: There is a partial empty sella.          

       



                                Nasopharynx: There is right nasal septal deviati

on.                 



                                Other vasculature: Normal flow is present from t

he 4th portions of both                 



                                                    vertebral arteries, the basi

lar artery and intracranial carotid arteries. There

                                                    



                                are no extra-axial fluid collections.           

      



                                                    IMPRESSION: Significant gabby

ventricular and subcortical areas of gliosis of the

                                                    



                                supratentorial brain is present. There is no acu

te cortical infarct,                 



                                parenchymal hemorrhage or an intra-axial mass. T

here is minimal to moderate                 



                                central and cortical atrophy. Hemosiderin deposi

tion is present along with                 



                                suspected calcification of 8 mm in the right cer

ebellum likely representing a                 



                                cavernous angioma.                 



                                Jack Gutierrez MD On 2019 12:43:14; VR-BB

[id number]                 

 

                2019-10-18      PROCEDURE INFORMATION:                 DORY monet



                16:17:00-00:00  Exam: MR Right Lower Extremity Without Contrast,

 Ankle                 



                                Exam date and time: 10/18/2019 4:19 PM          

       



                                Clinical history: 78 years old, female; Stress f

racture, right ankle, initial                 



                                                    encounter for fracture; Denilson

tional info: M84.371a stress fracture, right ankle,

                                                    



                                initial encounter for fracture/m84.371a stress f

racture, right ankle, initial                 



                                encounter for fracture                 



                                TECHNIQUE:                      



                                Imaging protocol: MR of the Right ankle without 

contrast.                 



                                COMPARISON:                     



                                ANKLE WO CONTRAST CT, RIGHT 2019 11:41 AM  

               



                                FINDINGS:                       



                                Alignment: Normal.                 



                                MEDIAL COMPARTMENT:                 



                                Posterior tibial, flexor hallucis longus, flexor

 digitorum longus tendons:                 



                                        There is a split tear of the posterior t

ibial tendon with tenosynovitis. There 

                                        



                                is diffuse tenosynovitis of the medial flexor te

ndons.                 



                                Deltoid ligament complex (superficial/deep): Nor

mal.                 



                                Spring ligament: Normal.                 



                                LATERAL COMPARTMENT:                 



                                Peroneus longus and brevis tendons: Minimal teno

synovitis of the peroneal                 



                                tendons.                        



                                Anterior inferior tibiofibular, posterior inferi

or tibiofibular, anterior                 



                                talofibular ligaments: Syndesmotic ligaments are

 intact. There is a partial                 



                                tear of anterior talofibular ligament.          

       



                                Calcaneofibular ligament: Normal.               

  



                                ANTERIOR COMPARTMENT:                 



                                Mild tendinosis of the tibialis anterior.       

          



                                GENERAL:                        



                                Bones and joints: There is significant marrow ed

ema and cystic change in the                 



                                medial talar dome with an overlying chondral def

ect. This is compatible with                 



                                osteochondral injury. There is associated tibiot

alar joint arthrosis.                 



                                There is also subtalar joint arthrosis as well w

ith associated subarticular                 



                                marrow edema and cystic change in the talus and 

calcaneus. This is most                 



                                prominent along the posterior subtalar facet.   

              



                                There is also marrow edema distal fibula and med

ial malleolus.                 



                                Muscles: Normal.                 



                                Achilles: Intact. There is edema in the pre-Achi

lles fat.                 



                                Tarsal tunnel: The distended tendon sheaths of t

he flexor tendons occupy the                 



                                tarsal tunnel.                  



                                Plantar fascia: Normal.                 



                                Sinus tarsi: There is abnormal signal in the sin

us tarsi. Correlate for sinus                 



                                tarsi syndrome.                 



                                                    Fluid: There are small ankle

 and subtalar joint effusions, or other significant

                                                    



                                internal debris and synovial thickening. This is

 compatible with synovitis.                 



                                Hypointense debris noted in both the ankle and s

ubtalar joint.                 



                                IMPRESSION:                     



                                1. Split tear of the posterior tibial tendon. Di

ffuse medial flexor                 



                                tenosynovitis.                  



                                2. Partial tear of the anterior talofibular liga

ment.                 



                                3. 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

 with associated marrow edema                 



                                and cystic change.                 



                                4. Mild ankle and subtalar joint arthrosis.     

            



                                5. Small ankle and subtalar joint effusions, how

ever evidence of significant                 



                                synovitis of both joints.                 



                                6. Mild tibialis anterior tendinosis.           

      



                                Given these findings, correlate clinically for i

nflammatory arthropathy.                 



                                Additional findings, as above.                 



                                Alin Casey MD On 10/19/2019 17:37:26; -ERM

[id number]                 

 

                2019-10-03      Patient Name: ROBBY Patton



                16:50:00-00:00  : 1940; Age: 78 years Female           

      



                                MR: [id number]                    



                                Study: Ext Lower Venous Doppler Unilat US 10/3/2

019 16:50 CDT                 



                                CLINICAL INDICATION: - RLE swelling.            

     



                                COMPARISON: None                 



                                TECHNIQUE:                      



                                                    Sonographic evaluation of th

e right lower extremity veins was performed using 

high resolution B-mode imaging, along with pulse and color Doppler imaging.     

                      



                                FINDINGS:                       



                                Right lower extremity:                 



                                                    The common femoral vein, fem

oral vein, popliteal vein and visualized posterior 

tibial/calf veins are patent and compressible.                           



                                                    The saphenofemoral junction 

and visualized greater saphenous vein are patent 

and compressible.                                   



                                                                



                                IMPRESSION:                     



                                No evidence of deep venous thrombosis within the

 right lower extremity.                 



                                                                



                                SL: [id number]                     

 

                2019-10-03      [facility]                 [facility]

[facility]



                16:31:00-:  HISTORY: 78 years old Female with - chest pain  

               



                                TECHNIQUE: A single AP view of the chest was obt

ained.                 



                                COMPARISON: Chest radiograph 2019         

        



                                FINDINGS:                       



                                                    The cardiomediastinal silhou

ette is within normal limits. Aortic 

calcifications. Scarring in the left lung base. No focal consolidation or 
pleural effusion is seen. No definite pneumothorax is seen. No                  

         



                                acute osseous abnormality is evident. Convex rig

ht thoracic scoliosis.                 



                                                                



                                IMPRESSION:                     



                                1. No acute cardiopulmonary abnormality.        

         



                                                                



                                SL: [id number]                     

 

                2019      Study: Right Foot wo contrast CT, Right ankle wo

 contrast CT                  [facility] [place]



                          11:31:00-:00            Clinical Indication: - M25.5

71 Pain in right ankle and joints of 

right foot                                          



                                Comparison: Plain films of the right ankle from 

2019                 



                                                    TECHNIQUE: Multiple axial CT

 images of the right ankle and right foot were 

acquired without the administration of intravenous contrast. Multiplanar 
reformatted images were performed.                           



                                                    CT imaging performed at this

 location utilizes radiation dose optimization 

techniques which include one or more of the following:                          

 



                                -Automated exposure control                 



                                -Adjustment of the mA and/or kV according to pat

ient size                 



                                -Use of iterative reconstruction technique      

           



                                                                



                                DLP= 141.89 mGy-cm                 



                                                    FINDINGS: No acute bony frac

ture, joint dislocation, or suspicious osseous 

lesion is seen. There is a large tibiotalar joint effusion. Subchondral cystic 
change along the medial talar dome is seen measu                           



                                                    ring 8 mm AP dimension, 4 mm

 transverse dimension, and 6 mm craniocaudal 

dimension, compatible with cystic osteochondral lesion. Mild osteoarthrosis of 
the talonavicular joint is seen with mild joint sp                           



                                                    ace narrowing, marginal osse

ous spurring, and subchondral cystic change. 

Bipartite tibial sesamoid bone is present. Os navicularis are noted.            

               



                                                    The Achilles tendon and plan

tar fascia are grossly intact. The medial flexor, 

lateral, and anterior tendons about the ankle are grossly intact. The dorsal and
plantar tendons across the foot are grossly intact.                           



                                                    There is mild edema of the c

ircumferential soft tissues about the ankle, 

nonspecific. Severe diffuse edema and stranding throughout the Kager's fat pad 
is noted, nonspecific. Arterial calcifications are present.                     

      



                                IMPRESSION:                     



                                1. No acute bony abnormality of the right ankle 

or right foot.                 



                                2. Cystic osteochondral lesion of the medial jaylyn

ar dome.                 



                                3. Large tibiotalar joint effusion.             

    



                                4. Mild osteoarthrosis of the talonavicular join

t.                 



                                                    5. Mild edema of the circumf

erential soft tissues about the ankle, nonspecific.

                                                    



                                                    6. Diffuse edema throughout 

the Kager's fat pad. Please correlate for Achilles 

peritendinitis.                                     



                                SL: [id number]                     

 

                2019      Study: Right Foot wo contrast CT, Right ankle wo

 contrast CT                  [facility]

[place]



                          11:31:00-00:00            Clinical Indication: - M25.5

71 Pain in right ankle and joints of 

right foot                                          



                                Comparison: Plain films of the right ankle from 

2019                 



                                                    TECHNIQUE: Multiple axial CT

 images of the right ankle and right foot were 

acquired without the administration of intravenous contrast. Multiplanar 
reformatted images were performed.                           



                                                    CT imaging performed at this

 location utilizes radiation dose optimization 

techniques which include one or more of the following:                          

 



                                -Automated exposure control                 



                                -Adjustment of the mA and/or kV according to pat

ient size                 



                                -Use of iterative reconstruction technique      

           



                                                                



                                DLP= 141.89 mGy-cm                 



                                                    FINDINGS: No acute bony frac

ture, joint dislocation, or suspicious osseous 

lesion is seen. There is a large tibiotalar joint effusion. Subchondral cystic 
change along the medial talar dome is seen measu                           



                                                    ring 8 mm AP dimension, 4 mm

 transverse dimension, and 6 mm craniocaudal 

dimension, compatible with cystic osteochondral lesion. Mild osteoarthrosis of 
the talonavicular joint is seen with mild joint sp                           



                                                    ace narrowing, marginal osse

ous spurring, and subchondral cystic change. 

Bipartite tibial sesamoid bone is present. Os navicularis are noted.            

               



                                                    The Achilles tendon and plan

tar fascia are grossly intact. The medial flexor, 

lateral, and anterior tendons about the ankle are grossly intact. The dorsal and
plantar tendons across the foot are grossly intact.                           



                                                    There is mild edema of the c

ircumferential soft tissues about the ankle, 

nonspecific. Severe diffuse edema and stranding throughout the Kager's fat pad 
is noted, nonspecific. Arterial calcifications are present.                     

      



                                IMPRESSION:                     



                                1. No acute bony abnormality of the right ankle 

or right foot.                 



                                2. Cystic osteochondral lesion of the medial jaylyn

ar dome.                 



                                3. Large tibiotalar joint effusion.             

    



                                4. Mild osteoarthrosis of the talonavicular join

t.                 



                                                    5. Mild edema of the circumf

erential soft tissues about the ankle, nonspecific.

                                                    



                                                    6. Diffuse edema throughout 

the Kager's fat pad. Please correlate for Achilles 

peritendinitis.                                     



                                SL: [id number]                     

 

                2019      Clinical Indication: abdominal pain - abd pain; 

                 KITA Cranbury



                15:54:00-00:00  Comparison: CT abdomen/pelvis from 2018.  

               



                                TECHNIQUE:                      



                                                    Grayscale and limited color 

sonographic evaluation of the abdomen was performed

with standard technique.                            



                                FINDINGS:                       



                                LIVER:                          



                                                    The visualized liver shows n

ormal contour, size, and morphology with normal 

parenchymal echo texture. The liver measures 11.2 cm in craniocaudal dimension. 

                                        



                                                    Limited visualized main port

al vein is grossly patent. The main portal vein is 

mildly dilated, measuring 16.5 mm in diameter. Appropriate monophasic 
hepatopedal flow is noted in the main portal vein.                           



                                BILE DUCTS:                     



                                                    The intrahepatic and extrahe

patic bile ducts are not dilated with the common 

bile duct measuring 2 mm.                           



                                The distal common bile duct is not well seen.   

              



                                GALLBLADDER:                    



                                                    There are no gallstones, gal

lbladder sludge, pericholecystic fluid or wall 

thickening. Gallbladder wall measures 1 mm in thickness.                        

   



                                PANCREAS:                       



                                The visualized pancreas appears unremarkable.   

              



                                SPLEEN:                         



                                The spleen is unremarkable and measures 6.9 cm i

n maximal dimension.                 



                                KIDNEY:                         



                                                    The right kidney measures 10

.4 x 3.7 x 4.5 cm. Right renal cortex measures 1.1 

cm in thickness.                                    



                                                    The left kidney measures 11.

0 x 5.9 x 4.6 cm. Left renal cortex measures 1.4 cm

in thickness.                                       



                                                    Bilateral kidneys are somewh

at echogenic in character. There is normal renal 

contour and morphology. There is no hydronephrosis.                           



                                AORTA AND INFERIOR VENA CAVA:                 



                                                    Visualized portions appear u

nremarkable. Visualized portions of the abdominal 

aorta measure 2 cm in diameter. Scattered atherosclerotic burden is appreciated 
in portions of the visualized abdominal aorta.                           



                                ASCITES:                        



                                There is no right abdominal ascites.            

     



                                IMPRESSION:                     



                                                    1. Mildly dilated main ajay

l vein. As an isolated finding (without reversal of

portal venous waveform or decreased portal venous velocity), this is of 
uncertain clinical significance.                           



                                                    2. Bilateral echogenic kidne

ys, which can be seen in acute or long-standing 

medical renal disease, in the appropriate clinical setting.                     

      



                                                                



                                SL: [id number]                     

 

                2019      Elbow 3 views DX, 2019 10:48 CDT           

      DORY Rodriguez



                10:48:00-00:00  HISTORY: - M25.522 Pain in left elbow           

      



                                                                



                                COMPARISON: None                 



                                                    FINDINGS: There is severe ch

ronic arthrosis of the left elbow. Marked deformity

of the ulnotrochlear articulation with marked remodeling of the olecranon. . 
Alignment of the joint is difficult to assess                           



                                                     though there appears to be 

some subluxation deformity of the ulnotrochlear. 

There is also marked deformity and erosions involving the radial head. Soft 
tissue swelling noted.                              



                                                    IMPRESSION: Advanced deformi

ty left elbow compatible with changes of severe 

chronic inflammatory arthrosis.                           



                                SL: [id number]                     

 

                2019      PROCEDURE: Chest Radiograph.                  

KITA Rodriguez



                10:48:00-00:00  Clinical Indication: Shortness of breath.       

          



                                Comparison: Chest radiograph 2018.        

         



                                FINDINGS:                       



                                                    The chest shows normal lung 

volumes without interstitial or airspace opacities,

pleural effusions or pneumothorax.                           



                                                                



                                                    The cardiac silhouette appea

rs slightly enlarged. Degenerative change and 

dextroscoliosis involves the thoracic spine.                           



                                IMPRESSION:                     



                                1. No chest radiographic evidence of acute cardi

opulmonary disease.                 



                                                                



                                                                



                                [id number]                      

 

                2019      3 VIEWS OF THE LEFT HAND                 DORY Rodriguez



                13:07:00-00:00  HISTORY: Left hand pain.                 



                                COMPARISON: No priors.                 



                                                    Bony structures are intact. 

Joint spaces of the hand maintained. Severe 

arthritic changes of the wrist with complete loss of joint space in some areas 
and erosive changes distal radius and ulna.                           



                                IMPRESSION:                     



                                                    Severe arthritic changes of 

the left wrist. Remaining bones of the hand intact 

but osteopenic.                                     



                                                                



                                ********END REPORT*******                 



                                                                



                                SL: [id number]                     

 

                2019      Patient Name: ROBBY Rodriguez



                12:27:00-00:00  : 1940; Age: 78 years y/o Female       

          



                                MR: [id number]                    



                                Physician: Daiana Wilson MD                

 



                                * ABDOMINAL SERIES, 2 views                 



                                                                



                                HISTORY: Acute generalized abdominal pain.      

           



                                COMPARISON: None                 



                                TECHNIQUE: Supine and upright radiographs of the

 abdomen were obtained.                 



                                                                



                                FINDINGS:                       



                                                    There is constipation. A mod

erate amount of fecal material is noted throughout 

the colon.                                          



                                                    The soft tissue outlines and

 bowel gas pattern are otherwise unremarkable. 

There is no evidence of obstruction or ileus.                           



                                There are no unusual intra-abdominal calcificati

ons.                 



                                                    There is mild to moderate th

oracolumbar scoliosis. There are scattered 

degenerative changes in the visualized spine.                           



                                The regional skeleton is otherwise unremarkable.

                 



                                                                



                                IMPRESSION:                     



                                1. Constipation.                 



                                2. Benign appearance the abdomen.               

  



                                                    3. Mild to moderate thoracol

umbar scoliosis and degenerative changes involving 

the visualized spine.                               



                                                                



                                [facility]                 

 

                [facility]      Exam: Right Hand 2 views DX                  JOSE Rodriguez



                12:17:00-00:00  Clinical Indication: Right hand pain.           

      



                                Comparison: None.                 



                                                                



                                FINDINGS:                       



                                                    The AP and lateral views of 

the right hand show normal alignment without 

fractures or dislocations. The digit and thumb interphalangeal joints and MCP 
joints are mildly narrowed with mild periarticular                           



                                                    soft tissue swelling. There 

is mild ulnar subluxation to the 1st MCP joint. The

wrist show severe chronic changes of an erosive arthritis most likely rheumatoid
arthritis.                                          



                                                    If there is further concern,

 recommend follow-up radiographs or bone scan for 

complete assessment.                                



                                                                



                                IMPRESSION:                     



                                                    1. Severe chronic changes of

 erosive arthritis of the wrist, most likely 

rheumatoid arthritis.                               



                                                    2. Mild narrowing and periar

ticular soft tissue swelling to the MCP and 

interphalangeal joints.                             



                                                                



                                SL: [id number]                     

 

                2019      Exam: Wrist complete Bilateral DX               

   KITA Rodriguez



                12:17:00-00:00  Clinical Indication: Bilateral wrist pain..     

            



                                Comparison: None.                 



                                                                



                                FINDINGS:                       



                                                    The AP, oblique and lateral 

views of the right wrist show no acute fractures or

dislocations. There is severe narrowing and remodeling of the radiocarpal joint 
with chronic erosion to the articular surf                           



                                                    ace of the distal radius and

 ulna. There is fusion of the carpal joints and 2nd

through 4th carpometacarpal joints. There is proximal migration of the capitate.
Overall findings consistent with Madelung                           



                                                     deformity and chronic SLAC 

wrist related to long-standing rheumatoid 

arthritis. There is moderate degenerative arthritis to the 1st CMC joint. There 
is mild wrist region soft tissue swelling.                           



                                                    The AP, oblique and lateral 

views of the left wrist show no acute fractures or 

dislocations. There is severe narrowing and remodeling of the radiocarpal joint 
with chronic erosion to the articular surfa                           



                                                    ce of the distal radius and 

ulna. There is fusion of the carpal joints 

carpometacarpal joints. There is proximal migration of the capitate. Overall 
findings consistent with Madelung deformity and chroni                          

 



                                                    c SLAC wrist related to long

-standing rheumatoid arthritis. There is mild 

degenerative arthritis to the 1st CMC joint. There is mild wrist region soft 
tissue swelling.                                    



                                                    If there is further concern,

 followup radiographs or MRI of the wrist may be 

performed for complete assessment.                           



                                                                



                                IMPRESSION:                     



                                                    1. Findings in both wrists c

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 

wrist related to long-standing rheumatoid arthritis.                           



                                                                



                                                                



                                SL: [id number]                     

 

                2019      Study: Right shoulder, 3 views                 M

H [place]



                14:38:00-00:00  Clinical Indication: - m25.511 pain in right inocencia

ulder                 



                                Comparison: None                 



                                                    FINDINGS: Multiple views of 

the right shoulder show no acute bony fracture, 

joint dislocation, or suspicious osseous lesion. Mild to moderate AC joint 
osteoarthrosis is seen. Surgical clips in the axilla are noted. Bones are 
demineralized.                                      



                                IMPRESSION:                     



                                1. No acute bony abnormality of the right should

er.                 



                                2. 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    

             



                                                                



                                SL: [id number]                     

 

                2019      Study: Cervical spine, 5 views                 M

H [place]



                14:38:00-00:00  Clinical Indication: - m54.2 cervicalgia        

         



                                Comparison: None                 



                                                    FINDINGS: Multiple views of 

the cervical spine show visualization through the 

T1 vertebral body on the lateral view. No acute vertebral body height loss is 
seen. There is grade 1 anterolisthesis of C4 o                           



                                                    lary C5 by 4.5 mm. Multilevel

 marginal osteophytes throughout cervical spine are

seen. Severe disc height loss at C3-C4 and C5-C6 is seen, compatible with severe
degenerative disc disease. Mild to modera                           



                                                    te degenerative disc disease

 at C4-C5 and C6-C7 is seen. Moderate-severe facet 

arthrosis of the cervical spine is noted. Moderate right-sided neural foraminal 
narrowing from C3-C4 through C5-C6 is seen.                           



                                                     Mild to moderate left-sided

 neural foraminal narrowing from C3-C4 through C5-

C6 is also noted. Odontoid process is intact. Prevertebral soft tissues are 
unremarkable.                                       



                                IMPRESSION: Advanced multilevel degenerative sia

nges of the cervical spine.                 



                                                                



                                : [id number]                     

 

                2019      Study: Right ankle, 3 views                  JOSE [place]



                          14:38:00-00:00            Clinical Indication: - m25.5

71 pain in right ankle and joints of 

right foot                                          



                                Comparison: None                 



                                                    FINDINGS: Multiple views of 

the right ankle show no acute bony fracture, joint 

location, or suspicious osseous lesion. Ankle mortise is congruent. Bones are 
demineralized. Mild medial soft tissue swelli                           



                                                    ng is seen. Arterial calcifi

cations are noted. Haziness throughout the Kager's 

fat pad is noted.                                   



                                IMPRESSION: No acute bony abnormality of the rig

ht ankle.                 



                                                                



                                : [id number]                     

 

                2019      Study: Right scapula, 2 views                 

 KITA [place]



                14:37:00-00:00  Clinical Indication: - m25.511 pain in right inocencia

ulder                 



                                Comparison: None                 



                                                    FINDINGS: Multiple views of 

the right scapula show no acute bony fracture, 

joint dislocation, or suspicious osseous lesion. Right axillary surgical clips 
are seen. Soft tissues are unremarkable.                           



                                IMPRESSION: Normal exam of the right scapula.   

              



                                                                



                                : [id number]                     

 

                2018      Clinical Indication: - chest pain and shortness 

of breath;                 [facility]



                11:55:00-00:00  Comparison: 2015                 



                                                    TECHNIQUE: Sequential trans-

axial images were obtained with a multi-detector 

helical CT after iodinated contrast administration. Coronal and sagittal 
reconstructions were obtained. 3-D MIP reconstructions performed.               

            



                                100 cc of omnipaque contrast material was used f

or the exam.                 



                                CT Radiation Dose  mGy-cm                

 



                                FINDINGS:                       



                                                    LUNG PARENCHYMA AND PLEURA: 

There are no lung nodules. There is no interstitial

lung disease. There are no pleural effusions. There is no pneumothorax.         

                  



                                AIRWAY: The central airway is normal.           

      



                                MEDIASTINUM: There is no mediastinal lymphadenop

athy.                 



                                                    HEART: The cardiac chambers 

appear unremarkable. There is no pericardial 

effusion.                                           



                                                    VASCULAR STRUCTURES: There a

re no segmental pulmonary emboli noted. The main, 

right and left pulmonary arteries are normal. The great vessels are 
unremarkable. The thoracic aorta is unremarkable. There                         

  



                                                    is no thoracic aortic dissec

tion or aneurysm. The superior vena cava is 

unremarkable.                                       



                                                    OSSEOUS STRUCTURES: There ar

e no definite significant osseous abnormalities 

seen.                                               



                                VISUALIZED UPPER ABDOMEN: The visualized upper a

bdomen is unremarkable.                 



                                IMPRESSION:                     



                                1. Stable ectasia of the ascending aorta.       

          



                                2. No evidence of pulmonary embolism or aortic d

issection..                 



                                                                



                                SL: [id number]                     

 

                2018      Patient Name: ROBBY MCKNIGHT : 1940

                 [facility]



                13:25:00-00:00  Age: 77 years, Female MR: [id number]              

   



                                Study: Chest 1view DX 2018 12:59 PM CDT    

             



                                Examination: Chest, PA.                 



                                Indication: Shortness of breath.                

 



                                Clinical information: - sob.                 



                                Comparison: Portable chest 2018           

      



                                Findings:                       



                                Lines/tubes: None.                 



                                Heart: Normal cardiac silhouette.               

  



                                                    Vessels: The pulmonary vascu

lature is within normal limits. Atherosclerotic 

calcifications of the aortic arch.                           



                                                                



                                Mediastinum: No mediastinal or hilar mass or lym

phadenopathy.                 



                                Lungs: No parenchymal mass. No focal consolidati

on.                 



                                Pleura: No pleural effusion. No pneumothorax.   

              



                                Soft tissues: Normal. No axillary mass or lympha

denopathy.                 



                                                    Bones: No acute osseous abno

rmality. Degenerative changes of the thoracic 

spine. Scoliosis of the thoracolumbar spine.                           



                                IMPRESSION:                     



                                No acute radiographic abnormality.              

   



                                                                



                                SL: [id number]                     

 

                2018      Clinical Indication: Abdominal pain with diarrhe

a.                 [facility]



                23:59:02-00:00  Comparison: CT of the chest, abdomen and pelvis 

2015.                 



                                                    TECHNIQUE: Helical imaging w

as performed without injection of IV contrast, from

the diaphragm through the symphysis with multiplanar reformations obtained.     

                      



                                IV CONTRAST: No IV contrast was administered.   

              



                                GI CONTRAST: No oral contrast was administered. 

                



                                DLP: 300 mGy-cm                 



                                FINDINGS:                       



                                        LOWER CHEST: There is dependent atelecta

sis versus scarring at the lung bases. 

                                        



                                                    LIVER: There are no gross ma

sses or intrahepatic biliary ductal dilatation 

noted.                                              



                                BILIARY TREE: There is no significant biliary du

ctal dilatation.                 



                                GALLBLADDER: The gallbladder is present.        

         



                                                    PANCREAS: The pancreas is un

remarkable. The pancreatic duct is normal in 

caliber.                                            



                                                    SPLEEN: The spleen is normal

 in size and there are no parenchymal 

abnormalities.                                      



                                                    ADRENALS: The right adrenal 

gland is unremarkable. The left adrenal gland is 

unremarkable.                                       



                                                    KIDNEYS: There is no evidenc

e of renal or ureteral calculi. There is no 

evidence of hydronephrosis.                           



                                                    BOWEL: A moderate to large a

mount of fecal material is noted throughout the 

colon. There is no evidence of bowel obstruction.                           



                                                    APPENDIX: The appendix is no

t visualized, however there are no pericecal 

inflammatory changes to suggest appendicitis.                           



                                                    PELVIS: The urinary bladder 

is unremarkable. There is no evidence of pelvic 

mass.                                               



                                PERITONEUM: There is no evidence for free intrap

eritoneal fluid or air.                 



                                                    SOFT TISSUES: The soft tissu

es are unremarkable. There is no evidence of masses

or hernias.                                         



                                                    LYMPH NODES: There are small

 bilateral inguinal and mesenteric lymph nodes, 

which are most likely reactive in etiology.                           



                                                    VASCULATURE: Atherosclerotic

 calcifications are seen of the nonaneurysmal 

abdominal aorta and branching vessels.                           



                                MUSCULOSKELETAL: There are degenerative changes 

within the visualized spine.                 



                                IMPRESSION:                     



                                                    A moderate to large amount o

f fecal material throughout the colon. Correlate 

for constipation.                                   



                                No evidence of bowel obstruction.               

  



                                No evidence of obstructive uropathy.            

     



                                SL: JESSICA                    

 

                          2018                Clinical Indication: - synco

pe. Loss of appetite and diarrhea x3 

weeks                                               [facility]



                18:00:00-00:00  Comparison: 2017 chest radiograph         

        



                                Findings:                       



                                Frontal view of the chest was obtained.         

        



                                The cardiac silhouette is normal.               

  



                                There is no lobar consolidation, effusion or maliha

ma. No pneumothorax.                 



                                                    No acute osseous abnormality

. There is chronic scoliosis. Surgical clips 

project over the right axilla.                           



                                IMPRESSION:                     



                                No acute cardiopulmonary abnormality.           

      



                                                                



                                SL: CARLY                    

 

                2018      EXAM: CT BRAIN WITHOUT CONTRAST                 

[facility]



                17:54:46-00:00  DATE: 3/21/2018 5:41 PM CDT                 



                                INDICATION: Syncope.                 



                                COMPARISON: None.                 



                                                    TECHNIQUE: CT images were ob

tained from the foramen magnum to the vertex 

without intravenous contrast on a multidetector CT. Coronal and sagittal 
reconstructions were also provided for review.                           



                                CT radiation dose DLP: 677 mGy-cm               

  



                                FINDINGS:                       



                                                    No acute intracranial hemorr

gautam, midline shift, or mass effect is identified. 

The gray-white matter differentiation is preserved. The ventricles and sulci are
prominent, compatible with mild diffuse pa                           



                                renchymal volume loss. Moderate chronic microang

iopathic changes are noted.                 



                                                    Bilateral intraocular lens r

eplacements are identified. Mucosal thickening is 

noted within the paranasal sinuses. The mastoid air cells are clear. The 
calvarium and skull base are intact. There is atherosclerotic calcification of 
the carotid siphons.                                



                                IMPRESSION:                     



                                No acute intracranial abnormality.              

   



                                                                



                                SL: [id number]                     

 

                2017      Study: Chest 2 views DX                 [facility]



                15:25:19-00:00  Clinical Indication: - SOB ASTHMA               

  



                                Comparison: Chest x-ray from 2017         

        



                                                    FINDINGS: The cardiac silhou

ette is normal in size. Mild tortuosity of the 

descending thoracic aorta is seen. The lungs are clear and without consolidation
or congestion. No pleural effusion or pneumothorax is seen. Osseous structures 
are stable.                                         



                                IMPRESSION:                     



                                No acute cardiopulmonary disease.               

  



                                                                



                                SL: [id number]                     

 

                2017      2-VIEW CHEST X-RAY. DATE: 2017 8:57 AM CDT  

               [facility]



                09:48:46-00:00  INDICATION: dyspnea                 



                                TECHNIQUE: Frontal and lateral views of the ches

t were performed.                 



                                COMPARISON:CT chest dated 2015            

     



                                                    FINDINGS: Mildly linear opac

ity in the left lower lobe laterally. Otherwise, 

the lungs are clear.                                



                                                    Tortuous mildly dilated aort

a. Cardiac silhouette is within normal limits 

otherwise.                                          



                                                    Osseous structures demonstra

te diffuse osteopenia with mild degenerative 

changes of the thoracic spine.                           



                                IMPRESSION:                     



                                                    Left lower lobe atelectasis 

or airspace disease. Recommend follow-up chest x-

ray for 4-6 weeks.                                  

 

                2017      PROCEDURE: ABDOMEN ULTRASOUND                 [facility]



                15:45:46-00:00  CLINICAL INDICATION: Abdominal pain.            

     



                                                    COMPARISON: Report of a ches

t, abdomen and pelvis CT performed 2015 was 

reviewed.                                           



                                                    TECHNIQUE: Grayscale and cabrera

ited color sonographic evaluation of the abdomen 

was performed with standard technique. Static images are submitted.             

              



                                FINDINGS:                       



                                LIVER:                          



                                                    The visualized liver shows n

ormal contour, size and morphology with normal 

parenchymal echotexture. The maximum craniocaudal dimension of the liver 
measures approximately 16 cm. Hepatopedal flow is demo                          

 



                                nstrated in the main portal vein by spectral Dop

pler analysis.                 



                                BILE DUCTS:                     



                                                    The intrahepatic and extrahe

patic bile ducts are not dilated with the 

visualized common bile duct measuring 0.53 cm.                           



                                GALLBLADDER:                    



                                                    There are no gallstones, gal

lbladder sludge, pericholecystic fluid or wall 

thickening.                                         



                                PANCREAS:                       



                                The pancreas is partially obscured. The visualiz

ed pancreas is unremarkable.                 



                                SPLEEN:                         



                                The spleen is unremarkable and measures 6.6 x 3.

8 x 3.9 cm.                 



                                KIDNEYS:                        



                                                    There is normal renal contou

r and morphology with normal parenchymal 

echotexture. There is no hydronephrosis. The right kidney measures 9.2 x 3.1 x 
5.2 cm. The left kidney measures 9.8 x 4.7 x 4.4 cm.                           



                                AORTA AND INFERIOR VENA CAVA:                 



                                                    There are aortic calcificati

ons. The caliber of the visualized abdominal aorta 

is within normal limits. A segment of bowel is noted adjacent to the anterior 
margin of the distal abdominal aorta limiting                           



                                                     evaluation in this location

. The visualized inferior vena cava is 

unremarkable.                                       



                                ASCITES:                        



                                There is no ascites.                 



                                IMPRESSION:                     



                                        1. Abdominal aortic calcifications witho

ut demonstrable aneurysmal dilatation. 

                                        



                                2. Otherwise unremarkable abdomen ultrasound.   

              



                                                                



                                SL: 15                          

 

                2015      CT CHEST ABDOMEN AND PELVIS WITHOUT CONTRAST:   

              [facility]



                09:12:45-00:00                                  



                                HISTORY: Thoracic aortic aneurysm, follow-up.   

              



                                                                



                                                    TECHNIQUE: Helical images of

 the chest, abdomen and pelvis were done with IV 

contrast. Oral contrast was also administered.                           



                                                                



                                                    FINDINGS: The ascending aort

a is dilated, measuring 3.9 cm in transverse 

dimension. There is no evidence of dissection. The remainder of the thoracic 
aorta is slightly tortuous but otherwise normal maria g                           



                                                    john. No other focal aneurysm

s in the chest, abdomen or pelvis are demonstrated.

Mild calcified plaques in the aortic arch and abdominal aorta are seen. There is
no significant aortoiliac stenosis. The s                           



                                                    upraaortic trunks, SMA and c

eliac artery show no significant stenoses. The 

renal arteries and LEILANI are patent although evaluation for stenosis is limited 
(the study is not a dedicated CTA).                           



                                                                



                                                    Mild scarring in the lung ba

ses is noted. There are no other significant 

pulmonary or pleural abnormalities. There is no mediastinal mass or significant 
lymph node enlargement.                             



                                                                



                                                    The liver, spleen, pancreas,

 kidneys and adrenal glands show no significant 

abnormalities. There is no intra-abdominal mass, free fluid or significant 
retroperitoneal abnormality. The GI tract is unremarkable. The uterus is absent.
                                                    



                                                                



                                                    There are diffuse degenerati

ve changes in the spine with mild kyphosis in the 

thoracic region. No acute osseous abnormalities are demonstrated.               

            



                                                                



                                IMPRESSION:                     



                                1. Dilated ascending aorta without evidence of d

issection.                 



                                2. No other acute CT abnormalities in the chest,

 abdomen or pelvis.                 



                                                                



                                SL:13

## 2023-08-13 NOTE — EKG
Test Date:    2023-08-11               Test Time:    16:19:58

Technician:   BOBBY                                    

                                                     

MEASUREMENT RESULTS:                                       

Intervals:                                           

Rate:         156                                    

AK:                                                  

QRSD:         76                                     

QT:           268                                    

QTc:          431                                    

Axis:                                                

P:                                                   

AK:                                                  

QRS:          40                                     

T:            257                                    

                                                     

INTERPRETIVE STATEMENTS:                                       

                                                     

Atrial fibrillation

Voltage criteria for left ventricular hypertrophy

Marked ST abnormality, possible inferolateral subendocardial injury

Abnormal ECG

Compared to ECG 08/11/2023 15:08:32

ST (T wave) deviation now present

Sinus tachycardia no longer present

Early repolarization no longer present



Electronically Signed On 08-13-23 15:30:06 CDT by Tapan Herndon

## 2023-08-13 NOTE — EKG
Test Date:    2023-08-11               Test Time:    15:08:32

Technician:   BOBBY                                    

                                                     

MEASUREMENT RESULTS:                                       

Intervals:                                           

Rate:         103                                    

CA:           182                                    

QRSD:         80                                     

QT:           338                                    

QTc:          442                                    

Axis:                                                

P:            62                                     

CA:           182                                    

QRS:          40                                     

T:            -29                                    

                                                     

INTERPRETIVE STATEMENTS:                                       

                                                     

Sinus tachycardia

Left ventricular hypertrophy with repolarization abnormality

Abnormal ECG

Compared to ECG 06/23/2023 11:05:51

Left ventricular hypertrophy now present

Early repolarization now present

Atrial fibrillation no longer present



Electronically Signed On 08-13-23 15:30:11 CDT by Tapan Herndon

## 2023-09-01 ENCOUNTER — HOSPITAL ENCOUNTER (OUTPATIENT)
Dept: HOSPITAL 97 - EKG | Age: 83
End: 2023-09-01
Attending: INTERNAL MEDICINE
Payer: COMMERCIAL

## 2023-09-01 DIAGNOSIS — Z88.2: ICD-10-CM

## 2023-09-01 DIAGNOSIS — Z88.3: ICD-10-CM

## 2023-09-01 DIAGNOSIS — I35.0: ICD-10-CM

## 2023-09-01 DIAGNOSIS — Z88.8: ICD-10-CM

## 2023-09-01 DIAGNOSIS — I11.0: ICD-10-CM

## 2023-09-01 DIAGNOSIS — Z98.890: ICD-10-CM

## 2023-09-01 DIAGNOSIS — I71.21: ICD-10-CM

## 2023-09-01 DIAGNOSIS — I50.9: ICD-10-CM

## 2023-09-01 DIAGNOSIS — I48.91: Primary | ICD-10-CM

## 2023-09-01 PROCEDURE — 93312 ECHO TRANSESOPHAGEAL: CPT

## 2023-09-01 NOTE — OP
Date of Procedure:  09/01/2023



Surgeon:  DEANNE ORTIZ



Procedure Performed:  Transesophageal echocardiogram.



Indication:  Atrial fibrillation, post Watchman placement.



Description Of Procedure:  After risks, benefits, and alternatives were explained, patient agreed to 
procedure and signed informed consent.  Patient was brought to the cardiac catheterization laboratory
.  After proper time-out, the back of the throat was numbed using viscous lidocaine and then the URI 
probe was inserted without difficulty and Watchman seated well.  No leak.  No thrombus.  URI probe wa
s removed.  The patient was sent to recovery in stable condition.



Conclusion:  Successful transesophageal echocardiogram with the Watchman seated well and no thrombus 
or leak.





SR/MODL

DD:  09/01/2023 08:28:53Voice ID:  [id number]

DT:  09/01/2023 09:42:28Report ID:  [id number]

## 2023-09-05 NOTE — TEE
TRANSESOPHAGEAL ECHOCARDIOGRAM REPORT

_______________________CARDIOLOGY DEPARTMENT______________________

DATE OF STUDY: 09/01/2023            HEIGHT: 5'1"        WEIGHT: 98 lbs   



DIAGNOSIS:  POST WATCHMAN



TECHNICIAN COMMENTS:  URI



CARDIAC HISTORY:  

CATHERIZATION: NO

SURGERY: NO

PROSTHETIC VALVE: NO

PACEMAKER: NO







2 DIMENSIONAL ASSESSMENT:

RIGHT ATRIUM: 

LEFT ATRIUM: 



RIGHT VENTRICLE: 

LEFT VENTRICLE: 



TRICUSPID VALVE: 

MITRAL VALVE: 



PULMONIC VALVE: 

AORTIC VALVE: 



PERICARDIAL EFFUSION: 

AORTIC ROOT: 







EJECTION FRACTION:  %



LEFT VENTRICULAR WALL MOTION: 



DOPPLER/COLOR FLOW: 



COMMENTS: 

  1. TRANSESOPHAGEAL PROBE WAS INSERTED, NO DIFFICULTY

  2. WATCHMAN DEVICE IS SEATED WELL, NO LEAK AND NO THROMBUS



TECHNOLOGIST:   RYANN BERTRAND

## 2024-08-31 ENCOUNTER — HOSPITAL ENCOUNTER (EMERGENCY)
Dept: HOSPITAL 97 - ER | Age: 84
Discharge: HOME | End: 2024-08-31
Payer: COMMERCIAL

## 2024-08-31 VITALS — TEMPERATURE: 97.7 F

## 2024-08-31 VITALS — SYSTOLIC BLOOD PRESSURE: 162 MMHG | DIASTOLIC BLOOD PRESSURE: 91 MMHG | OXYGEN SATURATION: 98 %

## 2024-08-31 DIAGNOSIS — S22.42XA: Primary | ICD-10-CM

## 2024-08-31 DIAGNOSIS — Z79.01: ICD-10-CM

## 2024-08-31 DIAGNOSIS — I48.91: ICD-10-CM

## 2024-08-31 DIAGNOSIS — I10: ICD-10-CM

## 2024-08-31 PROCEDURE — 96375 TX/PRO/DX INJ NEW DRUG ADDON: CPT

## 2024-08-31 PROCEDURE — 99285 EMERGENCY DEPT VISIT HI MDM: CPT

## 2024-08-31 PROCEDURE — 71250 CT THORAX DX C-: CPT

## 2024-08-31 PROCEDURE — 74176 CT ABD & PELVIS W/O CONTRAST: CPT

## 2024-08-31 PROCEDURE — 96374 THER/PROPH/DIAG INJ IV PUSH: CPT

## 2024-08-31 NOTE — EDPHYS
Physician Documentation                                                                           

 [facility]                                                                 

Name: Toi Dhillon                                                                            

Age: 83 yrs                                                                                       

Sex: Female                                                                                       

: 1940                                                                                   

MRN: [id number]                                                                                   

Arrival Date: 2024                                                                          

Time: 16:22                                                                                       

Account#: [id number]                                                                            

Bed 5                                                                                             

Private MD:                                                                                       

ED Physician Devendra Dempsey                                                                     

HPI:                                                                                              

                                                                                             

19:42 This 83 yrs old Female presents to ER via EMS with complaints of Back Injury.           rt  

19:42 Patient presents to the ED with pain to the left lateral chest wall. Patient states     rt  

      that her  with dementia through her against a table, causing pain to that area.      

      She denies difficulty breathing,, she denies other acute complaints at this time,           

      symptoms are moderate in severity, no other aggravating or elevating factors..              

                                                                                                  

Historical:                                                                                       

- Allergies:                                                                                      

16:25 Eliquis;                                                                                hb  

16:25 Levaquin;                                                                               hb  

16:25 Sulfa (Sulfonamide Antibiotics);                                                        hb  

- Home Meds:                                                                                      

16:25 diltiazem HCl 120 mg Oral capsule once [Active]; Eliquis 2.5 mg Oral tablet 2 times per hb  

      day [Active];                                                                               

- PMHx:                                                                                           

16:25 AAA; Atrial fibrillation; Hypertensive disorder;                                        hb  

                                                                                                  

- Immunization history:: Adult Immunizations up to date.                                          

- Infectious Disease History:: Denies.                                                            

- Social history:: Smoking status: Patient denies any tobacco usage or history of.                

- Family history:: not pertinent.                                                                 

                                                                                                  

                                                                                                  

ROS:                                                                                              

19:42 Constitutional: Negative for fever, chills, and weight loss, Respiratory: Negative for  rt  

      shortness of breath, cough, wheezing, and pleuritic chest pain, Abdomen/GI: Negative        

      for abdominal pain, nausea, vomiting, diarrhea, and constipation, MS/Extremity:             

      Negative for injury and deformity, Skin: Negative for injury, rash, and discoloration,      

      Neuro: Negative for headache, weakness, numbness, tingling, and seizure,                    

19:42 Cardiovascular: Positive for chest pain, Negative for edema,                                

19:42 Back: Positive for pain at rest, pain with movement,                                        

                                                                                                  

Exam:                                                                                             

19:42 Constitutional:  This is a well developed, well nourished patient who is awake, alert,  rt  

      and in no acute distress. Head/Face:  Normocephalic, atraumatic. Cardiovascular:            

      Regular rate and rhythm with a normal S1 and S2.  No gallops, murmurs, or rubs.  Normal     

      PMI, no JVD.  No pulse deficits. Respiratory:  Lungs have equal breath sounds               

      bilaterally, clear to auscultation and percussion.  No rales, rhonchi or wheezes noted.     

       No increased work of breathing, no retractions or nasal flaring. Abdomen/GI:  Soft,        

      non-tender, with normal bowel sounds.  No distension or tympany.  No guarding or            

      rebound.  No evidence of tenderness throughout. Skin:  Warm, dry with normal turgor.        

      Normal color with no rashes, no lesions, and no evidence of cellulitis. MS/ Extremity:      

      Pulses equal, no cyanosis.  Neurovascular intact.  Full, normal range of motion. Neuro:     

       Awake and alert, GCS 15, oriented to person, place, time, and situation.  Cranial          

      nerves II-XII grossly intact.  Motor strength 5/5 in all extremities.  Sensory grossly      

      intact.  Cerebellar exam normal.  Normal gait.                                              

19:42 Chest/axilla: Tenderness to the left lateral lower chest wall, no crepitus felt, no         

      lacerations, bruising, underlying skin changes.                                             

                                                                                                  

Vital Signs:                                                                                      

16:23  / 89; Pulse 68; Resp 20; Temp 97.7; Pulse Ox 100% on R/A; Weight 46.72 kg;       hb  

      Height 5 ft. 0 in. ; Pain 10/10;                                                            

17:01  / 81; Pulse 74; Resp 17; Pulse Ox 97% on R/A;                                    rs5 

18:35  / 93; Pulse 85; Resp 16; Pulse Ox 96% ;                                          ko1 

19:25  / 91; Pulse 91; Resp 17; Temp 97.7; Pulse Ox 98% on R/A; Pain 8/10;              bm8 

16:23 Body Mass Index 20.12 (46.72 kg, 152.4 cm)                                              hb  

16:23 Pain Scale: Adult                                                                       hb  

19:25 Pain Scale: Adult                                                                       bm8 

                                                                                                  

[place] Coma Score:                                                                               

19:25 Eye Response: spontaneous(4). Motor Response: obeys commands(6). Verbal Response:       bm8 

      oriented(5). Total: 15.                                                                     

                                                                                                  

MDM:                                                                                              

16:29 Patient medically screened.                                                             rt  

19:42 Differential diagnosis: Contusion, rib fracture, splenic laceration. Data reviewed:     rt  

      vital signs, nurses notes, radiologic studies. Consideration of Admission/Observation       

      Escalation of care including admission/observation considered. Discussed with patient's     

      primary care provider, Dr. Schmid, states no indications for admission at this time,         

      will follow-up patient on Tuesday, will call in pain medications.. I considered the         

      following discharge prescriptions or medication management in the emergency department      

      Medications were administered in the Emergency Department. See MAR. Independent             

      interpretation of the following test(s) in the Emergency Department CT Scan: My             

      interpretation is Rib fracture syndrome interpretation of CT scan images. Test              

      considered but Not performed: EKG: Pain is clearly traumatic in nature, EKG not             

      indicated. Care significantly affected by the following chronic conditions: Thoracic        

      aortic aneurysm. Counseling: I had a detailed discussion with the patient and/or            

      guardian regarding the historical points, exam findings, and any diagnostic results         

      supporting the discharge/admit diagnosis, radiology results, the need for outpatient        

      follow up, to return to the emergency department if symptoms worsen or persist or if        

      there are any questions or concerns that arise at home. Response to treatment: the          

      patient's symptoms have mildly improved after treatment.                                    

                                                                                                  

                                                                                             

16:38 Order name: CT Chest Abdomen Pelvis W/O Contrast; Complete Time: 17:39                  rt  

                                                                                             

18:46 Order name: INCENTIVE SPIROMETRY                                                        rt  

                                                                                                  

Administered Medications:                                                                         

16:40 Drug: morphine IVP or IV 4 mg IVP once over 4 mins Route: IVP; Infused Over: 4 mins;    rs5 

      Site: right antecubital;                                                                    

17:02 Follow up: Response: No adverse reaction; Pain is decreased                             rs5 

16:40 Drug: Ondansetron IVP 4 mg IVP once; over 2 minutes Route: IVP; Site: right antecubital;rs5 

17:02 Follow up: Response: No adverse reaction                                                rs5 

18:07 Drug: fentaNYL (PF) IVP 75 mcg IVP once Route: IVP; Site: right antecubital;            rs5 

19:25 Follow up: Response: No adverse reaction                                                bm8 

19:00 Drug: HYDROcodone-acetaminophen PO 5 mg-325 mg 1 tabs PO once Route: PO;                bm8 

19:25 Follow up: Response: No adverse reaction                                                bm8 

                                                                                                  

                                                                                                  

Disposition Summary:                                                                              

24 18:49                                                                                    

Discharge Ordered                                                                                 

 Notes:       Location: Home                                                                        
  rt

      Problem: new                                                                            rt  

      Symptoms: have improved                                                                 rt  

      Condition: Stable                                                                       rt  

      Diagnosis                                                                                   

        - Multiple left-sided rib fractures                                                   rt  

      Followup:                                                                               rt  

        - With: Private Physician                                                                  

        - When: 2 - 3 days                                                                         

        - Reason:                                                                                  

      Discharge Instructions:                                                                     

        - Discharge Summary Sheet                                                             rt  

        - Rib Fracture                                                                        rt  

        - How to Use an Incentive Spirometer                                                  rt  

      Forms:                                                                                      

        - Medication Reconciliation Form                                                      rt  

        - Antibiotic Education                                                                rt  

        - Prescription Opioid Use                                                             rt  

        - Patient Portal Instructions                                                         rt  

        - Leadership Thank You Letter                                                         rt  

      Prescriptions:                                                                              

        - Ultram 50 mg Oral Tablet                                                                 

            - take 1 tablet ORAL route every 6 hours As needed; 12 tablet; Refills: 0,        rt  

      Product Selection Permitted                                                                 

Signatures:                                                                                       

Dispatcher [employer]                           Irena Campos RN                     RN                                                      

Devendra Dempsey MD MD   rt                                                   

Vinicio Beard RN                       RN   rs5                                                  

Mohit Miller RN                      RN   bm8                                                  

                                                                                                  

**************************************************************************************************
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
## 2024-08-31 NOTE — RAD REPORT
EXAM DESCRIPTION:  CT - Chest Abd Pelvis Wo Con - 8/31/2024 5:10 pm

 

CLINICAL HISTORY:   Fall with chest and abdominal pain

 

 

 

COMPARISON:

 

2023

 

TECHNIQUE:  Computed axial tomography of the chest, abdomen and pelvis was obtained. Oral contrast wa
s given. IV contrast was not requested.

 

All CT scans are performed using dose optimization technique as appropriate and may include automated
 exposure control or mA/KV adjustment according to patient size.

 

FINDINGS:   The evaluation of mediastinum, maile, vessels and solid organs is limited secondary to the
 lack of IV contrast administration

 

 A lung contusion is not present.

 

Mildly displaced fracture 6 lateral left rib.

 

Markedly displaced fracture seventh and in date lateral right rib

 

Mildly displaced fracture posterior left ninth rib

 

Subcutaneous emphysema left lateral chest wall. No pneumothorax

 

Small left pleural. A pericardial effusion is not seen.

 

A mediastinal hematoma is not seen.

 

Watchmen device within the heart

 

4.8 centimeter aneurysm ascending thoracic aorta mildly increased size from the prior exam which it m
easured 4.6 centimeters.

 

The liver, spleen, pancreas, adrenals, kidneys and bladder do not demonstrate a traumatic injury.

 

There is no evidence of diverticulitis.

 

IMPRESSION:  Fractures sixth through ninth left ribs with small pleural effusion

 

4.8 centimeter aneurysm extending thoracic aorta